# Patient Record
Sex: MALE | Race: BLACK OR AFRICAN AMERICAN | NOT HISPANIC OR LATINO | Employment: FULL TIME | URBAN - METROPOLITAN AREA
[De-identification: names, ages, dates, MRNs, and addresses within clinical notes are randomized per-mention and may not be internally consistent; named-entity substitution may affect disease eponyms.]

---

## 2017-01-25 ENCOUNTER — INITIAL CONSULT (OUTPATIENT)
Dept: OTOLARYNGOLOGY | Facility: CLINIC | Age: 37
End: 2017-01-25
Payer: COMMERCIAL

## 2017-01-25 VITALS
HEIGHT: 71 IN | TEMPERATURE: 98 F | BODY MASS INDEX: 35.74 KG/M2 | WEIGHT: 255.31 LBS | DIASTOLIC BLOOD PRESSURE: 79 MMHG | HEART RATE: 70 BPM | SYSTOLIC BLOOD PRESSURE: 127 MMHG

## 2017-01-25 DIAGNOSIS — J34.2 NASAL SEPTAL DEVIATION: ICD-10-CM

## 2017-01-25 DIAGNOSIS — H73.893 TYMPANIC MEMBRANE RETRACTION, BILATERAL: ICD-10-CM

## 2017-01-25 DIAGNOSIS — G47.33 OSA (OBSTRUCTIVE SLEEP APNEA): Primary | ICD-10-CM

## 2017-01-25 DIAGNOSIS — J34.3 HYPERTROPHY OF BOTH INFERIOR NASAL TURBINATES: ICD-10-CM

## 2017-01-25 DIAGNOSIS — J35.3 TONSILLAR AND ADENOID HYPERTROPHY: ICD-10-CM

## 2017-01-25 PROCEDURE — 99203 OFFICE O/P NEW LOW 30 MIN: CPT | Mod: 25,S$GLB,, | Performed by: OTOLARYNGOLOGY

## 2017-01-25 PROCEDURE — 99999 PR PBB SHADOW E&M-EST. PATIENT-LVL III: CPT | Mod: PBBFAC,,, | Performed by: OTOLARYNGOLOGY

## 2017-01-25 PROCEDURE — 31231 NASAL ENDOSCOPY DX: CPT | Mod: S$GLB,,, | Performed by: OTOLARYNGOLOGY

## 2017-01-25 PROCEDURE — 1159F MED LIST DOCD IN RCRD: CPT | Mod: S$GLB,,, | Performed by: OTOLARYNGOLOGY

## 2017-01-25 RX ORDER — LISINOPRIL 20 MG/1
TABLET ORAL
Refills: 2 | COMMUNITY
Start: 2017-01-05 | End: 2018-01-31

## 2017-01-25 RX ORDER — TESTOSTERONE CYPIONATE 200 MG/ML
INJECTION, SOLUTION INTRAMUSCULAR
Refills: 2 | COMMUNITY
Start: 2016-12-02 | End: 2021-03-11

## 2017-01-25 RX ORDER — ERGOCALCIFEROL 1.25 MG/1
CAPSULE ORAL
Refills: 3 | COMMUNITY
Start: 2016-12-08 | End: 2018-01-31

## 2017-01-25 NOTE — LETTER
January 26, 2017      Beto Bragg MD  [address]  [facility] [address]           [facility] - Otorhinolaryngology  [address]  [facility] [address]  Phone: [phone]  Fax: [phone]          Patient: Francisco May II   MR Number: [id number]   YOB: 1980   Date of Visit: 1/25/2017       Dear Dr. Beto Bragg:    Thank you for referring Francisco May to me for evaluation. Attached you will find relevant portions of my assessment and plan of care.    If you have questions, please do not hesitate to call me. I look forward to following Francisco May along with you.    Sincerely,    Hollis Rice III, MD    Enclosure  CC:  No Recipients    If you would like to receive this communication electronically, please contact [email or web address] or [phone] to request more information on [employer] Link access.    For providers and/or their staff who would like to refer a patient to [facility], please contact us through our one-stop-shop provider referral line, [facility], at [phone].    If you feel you have received this communication in error or would no longer like to receive these types of communications, please e-mail [email or web address]

## 2017-01-25 NOTE — PATIENT INSTRUCTIONS
Endoscopy performed  Consult to sleep study ordered( [place] [id number])   Flonase use 1-2 sprays @ lateral nostril once a day prn  Gentle middle ear insufflation techniques encouraged  Indications for airway surgery briefly discussed; card for Dr. ISAMAR Maldonado provided for reference

## 2017-01-25 NOTE — PROGRESS NOTES
Subjective:       Patient ID: Francisco May II is a 36 y.o. male.    Chief Complaint: No chief complaint on file.    HPI: Mr. May is a 36 year old AAM who is a  working for [employer].  He indicates his failure to gain compliance with CPAP using treatment for his VITOR problem.  He has tried utolizing a full face mask with different pressures.  He was studied at an outside facility.  More recently was prescribed Flonase for nasal and eustachian tube condition affecting fluid behind his right ear by his GP.. The medication help clear up his clogged right ear due to fluid.  He indicates a previous myringotomy procedure as well as PE intubation procedures performed by a physician on the  [facility] 2013/2014.  He indicates his sinus problems.  He breathes out one nostril only;  the patency can switch sides.  He ndicates recent weight gain.  He has a history of syphilis previously treated with antibiotics.    PMH: High blood pressure  PSH: 1 ankle procedure and 2 knee surgeries  Family history: High blood pressure, stroke, diabetes  Habits: 1 alcoholic drink per day and one half caffeinated cup per day  Occupation: Portfolio coordinator  Review of Systems     Other:  Negative for rash.     Medication list includes Glucophage and lisinopril      Objective:     The patient has consented to an endoscopic study today which is been explained to him in detail.  The patient is transferred into the endoscopy suite.  Scope number [id number] is used for the study.  Pictures are recorded through the device.  The patient witnesses the scoping procedure in real time on the monitor.  Procedure: 4% lidocaine and Juan Manuel-Synephrine sprayed in each nasal passage.  Cetacaine is applied to the posterior pharynx.  After waiting several minutes scoping is performed.  There is no evidence of polypoid disease or purulent discharge in either nasal passage.  Right nasal passage is  marginally less patent due to septal deflection.    Both nasal passages are quite patent, especially anteriorly. T the inferior turbinate mucosa is mildly inflamed initially..  There is a narrow oropharyngeal hypopharyngeal inlet noted.  The epiglottis is small.  The base the tongue is bulky but the tissue appears within normal limits.  Patient has an excellent glottic airway.  The piriform sinuses are clear.  There is no evidence of vocal cord paresis or paralysis.  There is no evidence of true vocal cord pathology.  Adenoid lymphoid tissue is observed in the nasopharynx.  Scope was withdrawn.    Blood pressure 127/79 pulse 70 temperature 97.9 height 5 feet 11 inches weight 255 pounds  Gen.: Alert and oriented gentleman in no acute distress.  He is not hoarse and does not appear ill.  Physical Exam   Constitutional: He is oriented to person, place, and time. He appears well-developed and well-nourished.   HENT:   Head: Normocephalic.   Right Ear: Hearing, tympanic membrane and ear canal normal. No drainage. No foreign bodies. No mastoid tenderness. Tympanic membrane is not perforated. No decreased hearing is noted.   Left Ear: Hearing, tympanic membrane and ear canal normal. No drainage. No foreign bodies. No mastoid tenderness. Tympanic membrane is not perforated. No decreased hearing is noted.   Ears:    Nose: No nose lacerations, nasal deformity, septal deviation or nasal septal hematoma. No epistaxis. Right sinus exhibits no maxillary sinus tenderness and no frontal sinus tenderness. Left sinus exhibits no maxillary sinus tenderness and no frontal sinus tenderness.   Mouth/Throat: Uvula is midline, oropharynx is clear and moist and mucous membranes are normal. He does not have dentures. No oral lesions. No trismus in the jaw. No uvula swelling or dental caries. No oropharyngeal exudate or tonsillar abscesses.       Neck: No thyromegaly present.   Pulmonary/Chest: Effort normal. No stridor.   Lymphadenopathy:      He has no cervical adenopathy.   Neurological: He is alert and oriented to person, place, and time.   Skin: No rash noted.   Psychiatric: His behavior is normal.       Assessment:       1. VITOR (obstructive sleep apnea)    2. Tonsillar and adenoid hypertrophy    3. Nasal septal deviation    4. Hypertrophy of both inferior nasal turbinates    5. Tympanic membrane retraction, bilateral        Plan:     Endoscopy performed  Consult to sleep study ordered( [place] [id number])   Flonase use 1-2 sprays @ lateral nostril once a day prn  Gentle middle ear insufflation techniques encouraged  Indications for airway surgery briefly discussed; card for Dr. ISAMAR Maldonado provided for reference

## 2017-01-25 NOTE — MR AVS SNAPSHOT
[facility] Otorhinol[facility]ology  [address]  [facility] [address]  Phone: [phone]  Fax: [phone]                  Francisco May II   2017 11:00 AM   Initial consult    Description:  Male : 1980   Provider:  Hollis Rice III, MD   Department:  [facility] - Otorhinolaryngology           Diagnoses this Visit        Comments    VITOR (obstructive sleep apnea)    -  Primary     Tonsillar and adenoid hypertrophy         Nasal septal deviation         Hypertrophy of both inferior nasal turbinates         Tympanic membrane retraction, bilateral                To Do List           Future Appointments        Provider Department Dept Phone    2017 2:00 PM Beto Bragg MD [facility] - Infectious Diseases [phone]      Goals (5 Years of Data)     None      [facility]     [facility] On [facility] Nurse Care Line -  Assistance  Registered nurses in the [facility] On Call Center provide clinical advisement, health education, appointment booking, and other advisory services.  Call for this free service at [phone].             Medications           Message regarding Medications     Verify the changes and/or additions to your medication regime listed below are the same as discussed with your clinician today.  If any of these changes or additions are incorrect, please notify your healthcare provider.             Verify that the below list of medications is an accurate representation of the medications you are currently taking.  If none reported, the list may be blank. If incorrect, please contact your healthcare provider. Carry this list with you in case of emergency.           Current Medications     buPROPion (WELLBUTRIN XL) 300 MG 24 hr tablet TK 1 T PO QD    emtricitabine-tenofovir 200-300 mg (TRUVADA) 200-300 mg Tab Take 1 tablet by mouth once daily.    ergocalciferol (ERGOCALCIFEROL) 50,000 unit Cap TK ONE C PO  ONCE D WEEKLY    lisinopril (PRINIVIL,ZESTRIL) 20 MG  "tablet TK 1 T PO QD    metformin (GLUCOPHAGE-XR) 500 MG 24 hr tablet TK 1 T PO QD    testosterone cypionate (DEPOTESTOTERONE CYPIONATE) 200 mg/mL injection INJECT 1 ML IM Q WEEK    zolpidem (AMBIEN) 10 mg Tab            Clinical Reference Information           Vital Signs - Last Recorded  Most recent update: 1/25/2017 11:06 AM by Julien Nguyễn MA    BP Pulse Temp    127/79 (BP Location: Right arm, Patient Position: Sitting, BP Method: Automatic) 70 97.9 °F (36.6 °C) (Tympanic)    Ht Wt BMI    5' 11" (1.803 m) 115.8 kg (255 lb 4.7 oz) 35.61 kg/m2      Blood Pressure          Most Recent Value    BP  127/79      Allergies as of 1/25/2017     No Known Allergies      Immunizations Administered on Date of Encounter - 1/25/2017     None      Orders Placed During Today's Visit      Normal Orders This Visit    Ambulatory consult to Sleep Disorders       Instructions    Endoscopy performed  Consult to sleep study ordered( [place] [id number])   Flonase use 1-2 sprays @ lateral nostril once a day prn  Gentle middle ear insufflation techniques encouraged  Indications for airway surgery briefly discussed; card for Dr. ISAMAR Maldonado provided for reference             "

## 2017-01-26 ENCOUNTER — LAB VISIT (OUTPATIENT)
Dept: LAB | Facility: HOSPITAL | Age: 37
End: 2017-01-26
Attending: INTERNAL MEDICINE
Payer: COMMERCIAL

## 2017-01-26 ENCOUNTER — OFFICE VISIT (OUTPATIENT)
Dept: INFECTIOUS DISEASES | Facility: CLINIC | Age: 37
End: 2017-01-26
Payer: COMMERCIAL

## 2017-01-26 VITALS
HEART RATE: 82 BPM | BODY MASS INDEX: 35.49 KG/M2 | DIASTOLIC BLOOD PRESSURE: 77 MMHG | WEIGHT: 253.5 LBS | HEIGHT: 71 IN | TEMPERATURE: 98 F | SYSTOLIC BLOOD PRESSURE: 132 MMHG

## 2017-01-26 DIAGNOSIS — Z20.6 HIV EXPOSURE: ICD-10-CM

## 2017-01-26 PROCEDURE — 87591 N.GONORRHOEAE DNA AMP PROB: CPT

## 2017-01-26 PROCEDURE — 99213 OFFICE O/P EST LOW 20 MIN: CPT | Mod: S$GLB,,, | Performed by: INTERNAL MEDICINE

## 2017-01-26 PROCEDURE — 1159F MED LIST DOCD IN RCRD: CPT | Mod: S$GLB,,, | Performed by: INTERNAL MEDICINE

## 2017-01-26 PROCEDURE — 99999 PR PBB SHADOW E&M-EST. PATIENT-LVL III: CPT | Mod: PBBFAC,,, | Performed by: INTERNAL MEDICINE

## 2017-01-26 RX ORDER — EMTRICITABINE AND TENOFOVIR DISOPROXIL FUMARATE 200; 300 MG/1; MG/1
1 TABLET, FILM COATED ORAL DAILY
Qty: 30 TABLET | Refills: 3 | Status: SHIPPED | OUTPATIENT
Start: 2017-01-26 | End: 2017-06-22 | Stop reason: SDUPTHER

## 2017-01-26 NOTE — MR AVS SNAPSHOT
[facility] - Infectious Diseases  [address]  [place] [address]  Phone: [phone]  Fax: [phone]                  Francisco May II   2017 2:00 PM   Office Visit    Description:  Male : 1980   Provider:  Beto Bragg MD   Department:  [facility] - Infectious Diseases           Diagnoses this Visit        Comments    HIV exposure                To Do List           Future Appointments        Provider Department Dept Phone    2017 2:40 PM LAB, APPOINTMENT [place] [facility] [phone]      Goals (5 Years of Data)     None       These Medications        Disp Refills Start End    emtricitabine-tenofovir 200-300 mg (TRUVADA) 200-300 mg Tab 30 tablet 3 2017    Take 1 tablet by mouth once daily. - Oral    Pharmacy: [id number] - [place], [place] - [address] AT [facility] Ph #: [phone]         [facility] On Call     [facility] On Call Nurse Care Line -  Assistance  Registered nurses in the [facility] On Call Center provide clinical advisement, health education, appointment booking, and other advisory services.  Call for this free service at [phone].             Medications           Message regarding Medications     Verify the changes and/or additions to your medication regime listed below are the same as discussed with your clinician today.  If any of these changes or additions are incorrect, please notify your healthcare provider.             Verify that the below list of medications is an accurate representation of the medications you are currently taking.  If none reported, the list may be blank. If incorrect, please contact your healthcare provider. Carry this list with you in case of emergency.           Current Medications     buPROPion (WELLBUTRIN XL) 300 MG 24 hr tablet TK 1 T PO QD    ergocalciferol (ERGOCALCIFEROL) 50,000 unit Cap TK ONE C PO  ONCE D WEEKLY     "lisinopril (PRINIVIL,ZESTRIL) 20 MG tablet TK 1 T PO QD    metformin (GLUCOPHAGE-XR) 500 MG 24 hr tablet TK 1 T PO QD    testosterone cypionate (DEPOTESTOTERONE CYPIONATE) 200 mg/mL injection INJECT 1 ML IM Q WEEK    zolpidem (AMBIEN) 10 mg Tab     emtricitabine-tenofovir 200-300 mg (TRUVADA) 200-300 mg Tab Take 1 tablet by mouth once daily.           Clinical Reference Information           Vital Signs - Last Recorded  Most recent update: 1/26/2017  2:13 PM by Maria Fernanda Perry MA    BP Pulse Temp Ht Wt BMI    132/77 (BP Location: Left arm, Patient Position: Sitting, BP Method: Automatic) 82 97.9 °F (36.6 °C) (Oral) 5' 11" (1.803 m) 115 kg (253 lb 8.5 oz) 35.36 kg/m2      Blood Pressure          Most Recent Value    BP  132/77      Allergies as of 1/26/2017     No Known Allergies      Immunizations Administered on Date of Encounter - 1/26/2017     None      Orders Placed During Today's Visit     Future Labs/Procedures Expected by Expires    C. trachomatis/N. gonorrhoeae by AMP DNA Urine  1/26/2017 3/27/2018    Comprehensive metabolic panel  1/26/2017 3/27/2018    Herpes simplex type 1&2 IgG,Herpes titer  1/26/2017 1/26/2018    HIV-1 and HIV-2 antibodies  1/26/2017 1/26/2018    RPR  1/26/2017 3/27/2018      "

## 2017-01-26 NOTE — PROGRESS NOTES
Subjective:      Patient ID: Francisco May II is a 36 y.o. male.    Chief Complaint:No chief complaint on file.      History of Present Illness    Mr. May is a 37 y/o ortiz male who is involved in an open relationship with an HIV positive male.  He has been taking truvada for PREP.  He presents today for routine follow up.  He doesn't report any complaints.    Review of Systems   Constitution: Negative for chills, decreased appetite, fever, weakness, malaise/fatigue, night sweats, weight gain and weight loss.   HENT: Negative for congestion, ear pain, headaches, hearing loss, hoarse voice, sore throat and tinnitus.    Eyes: Negative for blurred vision, redness and visual disturbance.   Cardiovascular: Negative for chest pain, leg swelling and palpitations.   Respiratory: Negative for cough, hemoptysis, shortness of breath and sputum production.    Hematologic/Lymphatic: Negative for adenopathy. Does not bruise/bleed easily.   Skin: Negative for dry skin, itching, rash and suspicious lesions.   Musculoskeletal: Negative for back pain, joint pain, myalgias and neck pain.   Gastrointestinal: Negative for abdominal pain, constipation, diarrhea, heartburn, nausea and vomiting.   Genitourinary: Negative for dysuria, flank pain, frequency, hematuria, hesitancy and urgency.   Neurological: Negative for dizziness, numbness and paresthesias.   Psychiatric/Behavioral: Negative for depression and memory loss. The patient does not have insomnia and is not nervous/anxious.      Objective:   Physical Exam   Constitutional: He is oriented to person, place, and time. He appears well-developed and well-nourished. No distress.   HENT:   Head: Normocephalic and atraumatic.   Right Ear: External ear normal.   Left Ear: External ear normal.   Nose: Nose normal.   Mouth/Throat: Oropharynx is clear and moist. No oropharyngeal exudate.   Eyes: Conjunctivae and EOM are normal. Pupils are equal, round, and reactive to light. Right  eye exhibits no discharge. Left eye exhibits no discharge. No scleral icterus.   Neck: Normal range of motion. Neck supple. No JVD present. No tracheal deviation present. No thyromegaly present.   Cardiovascular: Normal rate, regular rhythm, normal heart sounds and intact distal pulses.  Exam reveals no gallop and no friction rub.    No murmur heard.  Pulmonary/Chest: Effort normal and breath sounds normal. No stridor. No respiratory distress. He has no wheezes. He has no rales. He exhibits no tenderness.   Abdominal: Soft. Bowel sounds are normal. He exhibits no distension and no mass. There is no tenderness. There is no rebound and no guarding.   Musculoskeletal: Normal range of motion. He exhibits no edema or tenderness.   Lymphadenopathy:     He has no cervical adenopathy.   Neurological: He is alert and oriented to person, place, and time. He has normal reflexes. He displays normal reflexes. No cranial nerve deficit. He exhibits normal muscle tone. Coordination normal.   Skin: Skin is warm. No rash noted. He is not diaphoretic. No erythema. No pallor.   Psychiatric: He has a normal mood and affect. His behavior is normal. Judgment and thought content normal.   Nursing note and vitals reviewed.    Assessment:       1. HIV exposure        I will screen him for HIV today.  I will also screen him for other STDs.  I will continue truvada.  Plan:       HIV exposure  -     emtricitabine-tenofovir 200-300 mg (TRUVADA) 200-300 mg Tab; Take 1 tablet by mouth once daily.  Dispense: 30 tablet; Refill: 3  -     HIV-1 and HIV-2 antibodies; Future; Expected date: 1/26/17  -     RPR; Future; Expected date: 1/26/17  -     Comprehensive metabolic panel; Future; Expected date: 1/26/17  -     C. trachomatis/N. gonorrhoeae by AMP DNA Urine; Future; Expected date: 1/26/17  -     Herpes simplex type 1&2 IgG,Herpes titer; Future; Expected date: 1/26/17

## 2017-01-27 LAB
C TRACH DNA SPEC QL NAA+PROBE: POSITIVE
N GONORRHOEA DNA SPEC QL NAA+PROBE: NEGATIVE

## 2017-01-29 ENCOUNTER — TELEPHONE (OUTPATIENT)
Dept: INFECTIOUS DISEASES | Facility: CLINIC | Age: 37
End: 2017-01-29

## 2017-01-29 ENCOUNTER — PATIENT MESSAGE (OUTPATIENT)
Dept: INFECTIOUS DISEASES | Facility: CLINIC | Age: 37
End: 2017-01-29

## 2017-01-29 DIAGNOSIS — A74.9 CHLAMYDIA: Primary | ICD-10-CM

## 2017-01-29 RX ORDER — AZITHROMYCIN 500 MG/1
1000 TABLET, FILM COATED ORAL ONCE
Qty: 2 TABLET | Refills: 0 | Status: SHIPPED | OUTPATIENT
Start: 2017-01-29 | End: 2017-01-29

## 2017-01-30 ENCOUNTER — TELEPHONE (OUTPATIENT)
Dept: SLEEP MEDICINE | Facility: CLINIC | Age: 37
End: 2017-01-30

## 2017-06-06 ENCOUNTER — PATIENT MESSAGE (OUTPATIENT)
Dept: INFECTIOUS DISEASES | Facility: CLINIC | Age: 37
End: 2017-06-06

## 2017-06-07 ENCOUNTER — OFFICE VISIT (OUTPATIENT)
Dept: INFECTIOUS DISEASES | Facility: CLINIC | Age: 37
End: 2017-06-07
Payer: COMMERCIAL

## 2017-06-07 VITALS
SYSTOLIC BLOOD PRESSURE: 122 MMHG | HEART RATE: 87 BPM | WEIGHT: 262 LBS | HEIGHT: 71 IN | TEMPERATURE: 98 F | DIASTOLIC BLOOD PRESSURE: 73 MMHG | BODY MASS INDEX: 36.68 KG/M2

## 2017-06-07 DIAGNOSIS — Z20.6 HIV EXPOSURE FROM BODY FLUIDS: Primary | ICD-10-CM

## 2017-06-07 DIAGNOSIS — Z11.3 SCREEN FOR STD (SEXUALLY TRANSMITTED DISEASE): ICD-10-CM

## 2017-06-07 DIAGNOSIS — N34.2 URETHRITIS: ICD-10-CM

## 2017-06-07 PROCEDURE — 99999 PR PBB SHADOW E&M-EST. PATIENT-LVL III: CPT | Mod: PBBFAC,,, | Performed by: INTERNAL MEDICINE

## 2017-06-07 PROCEDURE — 96372 THER/PROPH/DIAG INJ SC/IM: CPT | Mod: S$GLB,,, | Performed by: INTERNAL MEDICINE

## 2017-06-07 PROCEDURE — 87591 N.GONORRHOEAE DNA AMP PROB: CPT | Mod: 91

## 2017-06-07 PROCEDURE — 87591 N.GONORRHOEAE DNA AMP PROB: CPT

## 2017-06-07 PROCEDURE — 99213 OFFICE O/P EST LOW 20 MIN: CPT | Mod: 25,S$GLB,, | Performed by: INTERNAL MEDICINE

## 2017-06-07 RX ORDER — AZITHROMYCIN 500 MG/1
1000 TABLET, FILM COATED ORAL ONCE
Qty: 2 TABLET | Refills: 0 | Status: SHIPPED | OUTPATIENT
Start: 2017-06-07 | End: 2017-06-07

## 2017-06-07 NOTE — PROGRESS NOTES
Subjective:      Patient ID: Francisco May II is a 36 y.o. male.    Chief Complaint:Follow-up      History of Present Illness    Mr. May is a 37 y/o HIV negative male.  He is in an open relationship with an HIV positive male.  He has been on truvada for PREP.  Today, he reports symptoms of penile discharge.  He would like to be tested and possibly treated.    Review of Systems   Constitution: Negative for chills, decreased appetite, fever, weakness, malaise/fatigue, night sweats, weight gain and weight loss.   HENT: Negative for congestion, ear pain, headaches, hearing loss, hoarse voice, sore throat and tinnitus.    Eyes: Negative for blurred vision, redness and visual disturbance.   Cardiovascular: Negative for chest pain, leg swelling and palpitations.   Respiratory: Negative for cough, hemoptysis, shortness of breath and sputum production.    Hematologic/Lymphatic: Negative for adenopathy. Does not bruise/bleed easily.   Skin: Negative for dry skin, itching, rash and suspicious lesions.   Musculoskeletal: Negative for back pain, joint pain, myalgias and neck pain.   Gastrointestinal: Negative for abdominal pain, constipation, diarrhea, heartburn, nausea and vomiting.   Genitourinary: Negative for dysuria, flank pain, frequency, hematuria, hesitancy and urgency.   Neurological: Negative for dizziness, numbness and paresthesias.   Psychiatric/Behavioral: Negative for depression and memory loss. The patient does not have insomnia and is not nervous/anxious.      Objective:   Physical Exam   Constitutional: He is oriented to person, place, and time. He appears well-developed and well-nourished. No distress.   HENT:   Head: Normocephalic and atraumatic.   Right Ear: External ear normal.   Left Ear: External ear normal.   Nose: Nose normal.   Mouth/Throat: Oropharynx is clear and moist. No oropharyngeal exudate.   Eyes: Conjunctivae and EOM are normal. Pupils are equal, round, and reactive to light.  Right eye exhibits no discharge. Left eye exhibits no discharge. No scleral icterus.   Neck: Normal range of motion. Neck supple. No JVD present. No tracheal deviation present. No thyromegaly present.   Cardiovascular: Normal rate, regular rhythm, normal heart sounds and intact distal pulses.  Exam reveals no gallop and no friction rub.    No murmur heard.  Pulmonary/Chest: Effort normal and breath sounds normal. No stridor. No respiratory distress. He has no wheezes. He has no rales. He exhibits no tenderness.   Abdominal: Soft. Bowel sounds are normal. He exhibits no distension and no mass. There is no tenderness. There is no rebound and no guarding.   Musculoskeletal: Normal range of motion. He exhibits no edema or tenderness.   Lymphadenopathy:     He has no cervical adenopathy.   Neurological: He is alert and oriented to person, place, and time. He has normal reflexes. He displays normal reflexes. No cranial nerve deficit. He exhibits normal muscle tone. Coordination normal.   Skin: Skin is warm. No rash noted. He is not diaphoretic. No erythema. No pallor.   Psychiatric: He has a normal mood and affect. His behavior is normal. Judgment and thought content normal.   Nursing note and vitals reviewed.    Assessment:       1. HIV exposure from body fluids :  Will check HIV test.  He is to continue truvada for PREP for now.   2. Urethritis :  Patient reports penile discharge.  Will send urine for GC and chlamydia.  Will treat empirically with ceftriaxone IM and azithromycin PO.   3. Screen for STD (sexually transmitted disease) :  Will screen for STDs.  Also performed a swab of his throat and anal area for GC and chlamydia.         Plan:       HIV exposure from body fluids  -     HIV-1 and HIV-2 antibodies; Future; Expected date: 06/07/2017  -     CBC auto differential; Future; Expected date: 06/07/2017  -     Comprehensive metabolic panel; Future; Expected date: 06/07/2017  -     C. trachomatis/N. gonorrhoeae by  AMP DNA Urine; Future; Expected date: 06/07/2017  -     RPR; Future; Expected date: 06/07/2017  -     Cancel: FTA antibodies, IgG and IgM; Future; Expected date: 06/07/2018    Urethritis  -     C. trachomatis/N. gonorrhoeae by AMP DNA Urine; Future; Expected date: 06/07/2017  -     cefTRIAXone (ROCEPHIN) 250 mg in lidocaine HCL 10 mg/ml (1%) IM only syringe; Inject 1 mL (250 mg total) into the muscle once.  -     azithromycin (ZITHROMAX) 500 MG tablet; Take 2 tablets (1,000 mg total) by mouth once.  Dispense: 2 tablet; Refill: 0    Screen for STD (sexually transmitted disease)  -     HIV-1 and HIV-2 antibodies; Future; Expected date: 06/07/2017  -     CBC auto differential; Future; Expected date: 06/07/2017  -     Comprehensive metabolic panel; Future; Expected date: 06/07/2017  -     C. trachomatis/N. gonorrhoeae by AMP DNA Urine; Future; Expected date: 06/07/2017  -     RPR; Future; Expected date: 06/07/2017  -     Cancel: FTA antibodies, IgG and IgM; Future; Expected date: 06/07/2018  -     Miscellaneous Sendout Test Other (Specify) (Pharyngeal swab); Future; Expected date: 06/07/2017  -     Miscellaneous Sendout Test Other (Specify) (Pharyngeal swab); Future; Expected date: 06/07/2017  -     Miscellaneous Sendout Test Other (Specify) (anal swab); Future; Expected date: 06/07/2017  -     Miscellaneous Sendout Test Other (Specify) (anal swab); Future; Expected date: 06/07/2017  -     FTA antibodies, IgG and IgM; Future; Expected date: 06/07/2018

## 2017-06-08 ENCOUNTER — PATIENT MESSAGE (OUTPATIENT)
Dept: INFECTIOUS DISEASES | Facility: CLINIC | Age: 37
End: 2017-06-08

## 2017-06-08 LAB
C TRACH DNA SPEC QL NAA+PROBE: NOT DETECTED
N GONORRHOEA DNA SPEC QL NAA+PROBE: DETECTED

## 2017-06-09 LAB
C.TRACH RNA SOURCE: NORMAL
C.TRACH RNA SOURCE: NORMAL
C.TRACH,MISC. AMP RNA: NEGATIVE
C.TRACH,MISC. AMP RNA: NEGATIVE
N.GONORROHEAE, AMP RNA SOURCE: NORMAL
N.GONORROHEAE, AMP RNA SOURCE: NORMAL
N.GONORROHEAE, MISC. AMP RNA: NEGATIVE
N.GONORROHEAE, MISC. AMP RNA: NEGATIVE

## 2017-06-22 DIAGNOSIS — Z20.6 HIV EXPOSURE: ICD-10-CM

## 2017-06-23 RX ORDER — EMTRICITABINE AND TENOFOVIR DISOPROXIL FUMARATE 200; 300 MG/1; MG/1
TABLET, FILM COATED ORAL
Qty: 30 TABLET | Refills: 0 | Status: SHIPPED | OUTPATIENT
Start: 2017-06-23 | End: 2017-07-17 | Stop reason: SDUPTHER

## 2017-07-12 ENCOUNTER — PATIENT MESSAGE (OUTPATIENT)
Dept: INFECTIOUS DISEASES | Facility: CLINIC | Age: 37
End: 2017-07-12

## 2017-07-17 ENCOUNTER — TELEPHONE (OUTPATIENT)
Dept: PHARMACY | Facility: CLINIC | Age: 37
End: 2017-07-17

## 2017-07-17 DIAGNOSIS — Z20.6 HIV EXPOSURE: ICD-10-CM

## 2017-07-17 DIAGNOSIS — Z71.7 ENCOUNTER FOR HIV COUNSELING: Primary | ICD-10-CM

## 2017-07-17 RX ORDER — EMTRICITABINE AND TENOFOVIR DISOPROXIL FUMARATE 200; 300 MG/1; MG/1
1 TABLET, FILM COATED ORAL DAILY
Qty: 30 TABLET | Refills: 2 | Status: SHIPPED | OUTPATIENT
Start: 2017-07-17 | End: 2017-07-18

## 2017-07-17 NOTE — TELEPHONE ENCOUNTER
LVM- Hello this is [facility] specialty pharmacy, we have received an order from your provider for Truvada and will contact you once a benefits investigation is complete with copay information to set up pickup or shipment and [facility] consultation.  feel free to give us a call with  any questions prior to hearing back from us at [phone]. thank you!_ARLEN 7/17/17

## 2017-07-18 ENCOUNTER — PATIENT MESSAGE (OUTPATIENT)
Dept: INFECTIOUS DISEASES | Facility: CLINIC | Age: 37
End: 2017-07-18

## 2017-07-18 RX ORDER — EMTRICITABINE AND TENOFOVIR DISOPROXIL FUMARATE 200; 300 MG/1; MG/1
1 TABLET, FILM COATED ORAL DAILY
Qty: 30 TABLET | Refills: 3 | Status: SHIPPED | OUTPATIENT
Start: 2017-07-18 | End: 2017-12-11 | Stop reason: SDUPTHER

## 2017-07-18 NOTE — TELEPHONE ENCOUNTER
[facility]: [place] Person Code: [id number]  Truvada prior authorization approved on 07/18/2017  Approval date: 07/18/2017-10/18/2017  PA# [id number]    Preferred Specialty Pharmacy  [facility]  Telephone: [phone]

## 2017-07-18 NOTE — TELEPHONE ENCOUNTER
Unable to reach out to patient regarding specialty medication for Truvada. Prior authorization approved on 07/18/2017 but insurance mandates to fill with [facility]. Patient will need to call them to set up an account and provider will need to e-script/fax a prescription. Will also send via [facility].     Patient returned phone call back regarding specialty medication for Truvada: Inform patient the medication prior authorization is approved also reminder patient he will need to complete lab every 3 months. Patient will reach out to [facility] to check on the prescription. He have enough medication until the end of the month. Patient voiced understanding.

## 2017-12-11 DIAGNOSIS — Z71.7 ENCOUNTER FOR HIV COUNSELING: ICD-10-CM

## 2017-12-11 DIAGNOSIS — Z20.6 HIV EXPOSURE: ICD-10-CM

## 2017-12-11 RX ORDER — EMTRICITABINE AND TENOFOVIR DISOPROXIL FUMARATE 200; 300 MG/1; MG/1
1 TABLET, FILM COATED ORAL DAILY
Qty: 30 TABLET | Refills: 3 | Status: SHIPPED | OUTPATIENT
Start: 2017-12-11 | End: 2017-12-13 | Stop reason: SDUPTHER

## 2017-12-11 NOTE — TELEPHONE ENCOUNTER
Refill request signed.  However I would like for him to get these labs done before re-starting the truvada just to make sure we don't cause any problems.  I am putting the orders in now.

## 2017-12-13 DIAGNOSIS — Z20.6 HIV EXPOSURE: ICD-10-CM

## 2017-12-13 DIAGNOSIS — Z71.7 ENCOUNTER FOR HIV COUNSELING: ICD-10-CM

## 2017-12-13 RX ORDER — EMTRICITABINE AND TENOFOVIR DISOPROXIL FUMARATE 200; 300 MG/1; MG/1
1 TABLET, FILM COATED ORAL DAILY
Qty: 30 TABLET | Refills: 3 | Status: SHIPPED | OUTPATIENT
Start: 2017-12-13 | End: 2018-05-07 | Stop reason: SDUPTHER

## 2017-12-13 NOTE — TELEPHONE ENCOUNTER
Pts medication was sent to [facility] instead of [facility]. Please resend. Pharmacy updated in pts chart. JASIEL

## 2018-01-24 ENCOUNTER — TELEPHONE (OUTPATIENT)
Dept: SLEEP MEDICINE | Facility: CLINIC | Age: 38
End: 2018-01-24

## 2018-01-31 ENCOUNTER — OFFICE VISIT (OUTPATIENT)
Dept: SLEEP MEDICINE | Facility: CLINIC | Age: 38
End: 2018-01-31
Payer: COMMERCIAL

## 2018-01-31 VITALS
SYSTOLIC BLOOD PRESSURE: 140 MMHG | HEIGHT: 71 IN | DIASTOLIC BLOOD PRESSURE: 76 MMHG | BODY MASS INDEX: 38.7 KG/M2 | WEIGHT: 276.44 LBS | HEART RATE: 76 BPM

## 2018-01-31 DIAGNOSIS — G47.33 OBSTRUCTIVE SLEEP APNEA: Primary | ICD-10-CM

## 2018-01-31 PROCEDURE — 3008F BODY MASS INDEX DOCD: CPT | Mod: S$GLB,,, | Performed by: NURSE PRACTITIONER

## 2018-01-31 PROCEDURE — 99999 PR PBB SHADOW E&M-EST. PATIENT-LVL III: CPT | Mod: PBBFAC,,, | Performed by: NURSE PRACTITIONER

## 2018-01-31 PROCEDURE — 99204 OFFICE O/P NEW MOD 45 MIN: CPT | Mod: SA,S$GLB,, | Performed by: NURSE PRACTITIONER

## 2018-01-31 NOTE — PATIENT INSTRUCTIONS
Obstructive Sleep Apnea  Obstructive sleep apnea is a condition that causes your air passages to become narrowed or blocked during sleep. As a result, breathing stops for short periods. Your body wakes up enough for breathing to begin again, though you don't remember it. The cycle of stopped breathing and brief awakenings can repeat dozens of times a night. This prevents the body from getting to the deeper stages of sleep that are needed for good rest and may cause your body's oxygen level to fall.  Signs of sleep apnea include loud snoring, noisy breathing, and gasping sounds during sleep. Daytime symptoms include waking up tired after a full night's sleep, waking up with headaches, feeling very sleepy or falling asleep during the day, and having problems with memory or concentration.  Risk factors for sleep apnea include:  · Being overweight  · Being a man, or a woman in menopause  · Smoking  · Using alcohol or sedating medicines  · Having enlarged structures in the nose or throat  Home care  Lifestyle changes that can help treat snoring and sleep apnea include the following:  · If you are overweight, lose weight. Talk to your healthcare provider about a weight-loss plan for you.  · Avoid alcohol for 3 to 4 hours before bedtime. Avoid sedating medications. Ask your healthcare provider about the medicines you take.  · If you smoke, talk to your healthcare provider about ways to quit.  · Sleep on your side. This can help prevent gravity from pulling relaxed throat tissues into your breathing passages.  · If you have allergies or sinus problems that block your nose, ask your healthcare provider for help.  Follow-up care  Follow up with your healthcare provider, or as advised. A diagnosis of sleep apnea is made with a sleep study. Your healthcare provider can tell you more about this test.  When to seek medical advice  Sleep apnea can make you more likely to have certain health problems. These include high blood  pressure, heart attack, stroke, and sexual dysfunction. If you have sleep apnea, talk to your healthcare provider about the best treatments for you.  Date Last Reviewed: 4/1/2017  © 9538-8344 [email or web address]. [address] [place] [address], [place] [address]. All rights reserved. This information is not intended as a substitute for professional medical care. Always follow your healthcare professional's instructions.

## 2018-01-31 NOTE — PROGRESS NOTES
"Francisco May II was seen as a new patient, referred by. Dr. Rice for the management of obstructive sleep apnea.     CHIEF COMPLAINT: Snoring, excessive daytime sleepiness    HISTORY OF PRESENT ILLNESS Francisco May II a 37 y.o.  male presents for the management of obstructive sleep apnea. Per records "He indicates his failure to gain compliance with CPAP using treatment for his VITOR problem.  He has tried utolizing a full face mask with different pressures.  He was studied at an outside facility" Study was 2010. Was always hard to fall asleep with big FFM on. Could never use it. Would awake suffocating sensation. Stopped using it ~ 5 yrs ago. W/o mask +snores and + daytime sleepiness/tired on a regular basis. He struggles in the am, gets to work later than he would like. Denies am headaches. When aroused from sleep has to take deep breath and his heart is racing. +witnessed apneic pauses. Nocturia 2x. Gained 45# past 1. 5yrs.   Energy level is low. Wishes he could be up at 6a and ride bike/exercise.     Denies symptoms of restless legs or kicking during sleep.     On todays Cunningham Sleepiness Scale the patient scores a 18/24.     BT:11:30p-1am  WT: 7a alarm goes off, sleeps through,wakes up 8a  Sleep quality: 2/5 quality,  unrefreshing     FAMILY HISTORY: uncle +VITOR    SOCIAL HISTORY: Single. 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  Otherwise, a balance of 10 systems reviewed is negative        PHYSICAL EXAM:   BP (!) 140/76   Pulse 76   Ht 5' 11" (1.803 m)   Wt 125.4 kg (276 lb 7.3 oz)   BMI 38.56 kg/m²   GENERAL: Obese body habitus, well groomed   HEENT: Conjunctivae are non-erythematous; Pupils equal, round, and reactive to light; Nose is " symmetrical; Nasal mucosa is normal; Septum is deviated; Inferior turbinates are normal; Nasal airflow is normal; Posterior pharynx is pink; Modified Mallampati: IV; Posterior palate is low; Tonsils 2-3+; Uvula is long/thick and erythematous;Tongue is high, broad with scalloped edges; Dentition is fair; No TMJ tenderness; Jaw opening and protrusion without click and without discomfort.   NECK: Supple. Neck circumference is 18.5 inches. No thyromegaly. No palpable nodes.   SKIN: On face and neck: No abrasions, no rashes, no lesions. No subcutaneous nodules are palpable.   RESPIRATORY: Chest is clear to auscultation. Normal chest expansion and non-labored breathing at rest.   CARDIOVASCULAR: Normal S1, S2. No murmurs, gallops or rubs. No carotid bruits bilaterally.   EXTREMITIES: No edema. No clubbing. No cyanosis. Station normal. Gait normal.   NEURO/PSYCH: Oriented to time, place and person. Normal attention span and concentration. Affect is full. Mood is normal.       ASSESSMENT:     VITOR, unknown severity, dx'd 2010 outside facility/no records to review today. Was intolerable to using cpap ?pressure after setup, smothering sensation/mask interface.He had no adherence monitoring. Ready to resume. Persistent symptoms of snoring/apneic pauses/daytime sleepiness/dozing off affecting work.     PLAN:   1. Obtain outside sleep study, he may have at home/scan to me. Bring cpap machine to [facility] to interrogation/ensure cflex at 2-3. Get new FFM/meet with resp therapist. RTC 4-5 wks AFTER resumign use for adherence monitoring/ensure successful re-acclimation   2. Discussed etiology of VITOR and potential ramifications of untreated VITOR, including stroke, heart disease, HTN.  We discussed potential treatment options, which could include weight loss (10-15%), continuous positive airway pressure (CPAP-definitive), mandibular advancement splint by dentist, or referral for surgical consideration.   3. The patient was advised to abstain  from driving should he feel sleepy or drowsy.   4. Encouraged weight loss efforts for potential improvement of VITOR and overall health benefits      Thank you for allowing me the opportunity to participate in the care of your patient

## 2018-03-28 ENCOUNTER — OFFICE VISIT (OUTPATIENT)
Dept: OTOLARYNGOLOGY | Facility: CLINIC | Age: 38
End: 2018-03-28
Payer: COMMERCIAL

## 2018-03-28 VITALS
WEIGHT: 275.56 LBS | DIASTOLIC BLOOD PRESSURE: 71 MMHG | HEART RATE: 78 BPM | SYSTOLIC BLOOD PRESSURE: 119 MMHG | BODY MASS INDEX: 38.43 KG/M2

## 2018-03-28 DIAGNOSIS — H91.91 HEARING LOSS OF RIGHT EAR, UNSPECIFIED HEARING LOSS TYPE: ICD-10-CM

## 2018-03-28 DIAGNOSIS — H65.01 RIGHT ACUTE SEROUS OTITIS MEDIA, RECURRENCE NOT SPECIFIED: Primary | ICD-10-CM

## 2018-03-28 DIAGNOSIS — J35.1 TONSILLAR HYPERTROPHY: ICD-10-CM

## 2018-03-28 DIAGNOSIS — Z96.22 S/P MYRINGOTOMY WITH INSERTION OF TUBE: ICD-10-CM

## 2018-03-28 DIAGNOSIS — G47.33 OSA ON CPAP: ICD-10-CM

## 2018-03-28 PROCEDURE — 99999 PR PBB SHADOW E&M-EST. PATIENT-LVL III: CPT | Mod: PBBFAC,,, | Performed by: OTOLARYNGOLOGY

## 2018-03-28 PROCEDURE — 99213 OFFICE O/P EST LOW 20 MIN: CPT | Mod: S$GLB,,, | Performed by: OTOLARYNGOLOGY

## 2018-03-28 RX ORDER — ERGOCALCIFEROL 1.25 MG/1
CAPSULE ORAL
Refills: 3 | COMMUNITY
Start: 2018-03-14 | End: 2021-03-09 | Stop reason: SDUPTHER

## 2018-03-28 RX ORDER — AMOXICILLIN AND CLAVULANATE POTASSIUM 875; 125 MG/1; MG/1
1 TABLET, FILM COATED ORAL 2 TIMES DAILY
Qty: 20 TABLET | Refills: 0 | Status: SHIPPED | OUTPATIENT
Start: 2018-03-28 | End: 2018-04-07

## 2018-03-28 RX ORDER — BENZONATATE 100 MG/1
CAPSULE ORAL
Refills: 0 | COMMUNITY
Start: 2018-02-26 | End: 2018-04-16

## 2018-03-28 RX ORDER — (CIPROFLOXACIN AND FLUOCINOLONE ACETONIDE) .75; .0625 MG/.25ML; MG/.25ML
SOLUTION AURICULAR (OTIC)
Refills: 0 | COMMUNITY
Start: 2018-02-21 | End: 2018-04-16

## 2018-03-28 NOTE — PATIENT INSTRUCTIONS
Gentle middle ear insufflation techniques encouraged  Mucinex D 60 mg in AM x 2-3 weeks may help  Politzerization attempted; E.T. literature provided  Rx for Augmentin 875/125; take BID with food x 10 days; electonically prescribed  RTC 2-3 weeks; audiometry on return  Continue use of Flonase daily  Discontinue use of ear drops

## 2018-03-28 NOTE — PROGRESS NOTES
Subjective:       Patient ID: Francisco May II is a 37 y.o. male.    Chief Complaint: Ear Drainage    HPI: Mr. May is a 37-year-old -American male with a BMI of 38 kg/m² with a history of VITOR who I initially evaluated here 1/25/17 for an airway evaluation.   He was diagnosed with tonsil and adenoid hypertrophy, nasal septal deviation, hypertrophy of his inferior turbinates and retracted tympanic membranes bilaterally.  He has been followed by our  infectious disease service here for exposure to HIV per EMR notes of 1/26/17.  He was taking Truvada for PREP.  He indicates recent perception of drainage from his right ear.  He feels like he is been on a plane.  In fact he has been on a plane to [facility] relatively recently.  He will fly again to [place] in late April of this year.  He ndicates treatment by a primary care physician with a type of ear drop,Otovel.  This has not helped his right ear condition at all.  He indicates decrease hearing perception in his right ear.  He indicates a history of a right myringotomy procedure performed for a right serous otitis media condition by a physician on [facility]  camp[facility] in early 2013..  PE tubes were ultimately placed in both years requiring anesthesia subsequently.  Both tubes have since extruded.  His right ear now feels the same as it did before the PE intubation procedures.  He has a previous history of syphilis treated with antibiotics.    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 Right acute serous otitis media, recurrence not specified    2. S/P myringotomy with insertion of tube    3. VITOR on CPAP    4. Hearing loss of right ear, unspecified hearing loss type    5. Tonsillar  hypertrophy        Plan:     Gentle middle ear insufflation techniques encouraged  Mucinex D 60 mg in AM x 2-3 weeks may help  Politzerization attempted; E.T. literature provided  Rx for Augmentin 875/125; take BID with food x 10 days; electonically prescribed  RTC 2-3 weeks; audiometry on return  Continue use of Flonase daily  Discontinue use of ear drops

## 2018-04-16 ENCOUNTER — OFFICE VISIT (OUTPATIENT)
Dept: OTOLARYNGOLOGY | Facility: CLINIC | Age: 38
End: 2018-04-16
Payer: COMMERCIAL

## 2018-04-16 ENCOUNTER — CLINICAL SUPPORT (OUTPATIENT)
Dept: AUDIOLOGY | Facility: CLINIC | Age: 38
End: 2018-04-16
Payer: COMMERCIAL

## 2018-04-16 VITALS — SYSTOLIC BLOOD PRESSURE: 144 MMHG | DIASTOLIC BLOOD PRESSURE: 82 MMHG | HEART RATE: 84 BPM | TEMPERATURE: 98 F

## 2018-04-16 DIAGNOSIS — H93.8X1 SENSATION OF FULLNESS IN RIGHT EAR: ICD-10-CM

## 2018-04-16 DIAGNOSIS — Z96.22 S/P MYRINGOTOMY WITH INSERTION OF TUBE: ICD-10-CM

## 2018-04-16 DIAGNOSIS — H65.04 ACUTE SEROUS OTITIS MEDIA, RECURRENT, RIGHT EAR: Primary | ICD-10-CM

## 2018-04-16 DIAGNOSIS — H90.6 MIXED CONDUCTIVE AND SENSORINEURAL HEARING LOSS, BILATERAL: Primary | ICD-10-CM

## 2018-04-16 PROCEDURE — 92557 COMPREHENSIVE HEARING TEST: CPT | Mod: S$GLB,,, | Performed by: AUDIOLOGIST

## 2018-04-16 PROCEDURE — 99213 OFFICE O/P EST LOW 20 MIN: CPT | Mod: S$GLB,,, | Performed by: OTOLARYNGOLOGY

## 2018-04-16 PROCEDURE — 99999 PR PBB SHADOW E&M-EST. PATIENT-LVL III: CPT | Mod: PBBFAC,,, | Performed by: OTOLARYNGOLOGY

## 2018-04-16 PROCEDURE — 92567 TYMPANOMETRY: CPT | Mod: S$GLB,,, | Performed by: AUDIOLOGIST

## 2018-04-16 NOTE — PROGRESS NOTES
Mr. Francisco May was seen in the clinic today for an audiological evaluation.   He reported fullness and decreased hearing of the right ear.    Audiological testing revealed a mild to moderate mixed hearing loss with excellent discrimination, bilaterally.      Tympanometry testing revealed a Type B tympanogram for the right ear and a Type C tympanogram for the left ear.      Recommendations:  1. Otologic evaluation  2. Recheck hearing following medical clearance  3. Hearing protection when in noise

## 2018-04-16 NOTE — PATIENT INSTRUCTIONS
Politzerization attempted; some AD aeration  Gentle middle ear insufflation techniques encouraged  Air travel/E.T. literature provided  Rx for prednisone 20 mg # 5; take one a day x 5 days with breakfast ( hold Mucinex D for now)   RTC next week for otologic evaluation ( before next flight 4/27/18)

## 2018-04-16 NOTE — PROGRESS NOTES
Subjective:       Patient ID: Francisco May II is a 37 y.o. male.    Chief Complaint: No chief complaint on file.    HPI: Mr. May is a 37-year-old -American male who was evaluated by me 3/28/18 for ear drainage.  He was diagnosed with a right serous otitis media condition.  He had completed a plane trip to [place] relatively recently.   Audiometry documented his bilateral 30 dB SRT scores and flat right tympanometric graph with a -200 pressure indicated for the left ear as well.    He was treated with an Augmentin course as well as politzerization and gentle middle ear insufflation techniques.  He indicates a very recent  popping and crackling sensation with improved hearing in his right ear over the past 2-3 days subsequent to finishing the antibiotic course.  He is utilizing Flonase spray.  He dissipates on the plane flight at the end of the following week April 27.  He has a history of a right myringotomy procedure performed for a right serous otitis media condition by a physician on the [facility] in early 2013.  Both ears were ultimately intubated.  Both tubes have since extruded.  He has a history of VITOR and has been worked up for airway obstruction.    He has been evaluated/treated  by our infectious disease service here for exposure to HIV.  His medication list includes Glucophage.    He is concerned about upcoming plane flight and 11 days since effect on his right ear specifically.      ALLERGIES: None known  History reviewed. No pertinent past medical history.  Current Outpatient Prescriptions on File Prior to Visit   Medication Sig Dispense Refill    buPROPion (WELLBUTRIN XL) 300 MG 24 hr tablet TK 1 T PO QD  2    ergocalciferol (ERGOCALCIFEROL) 50,000 unit Cap TK ONE C PO Q WEEK  3    metformin (GLUCOPHAGE-XR) 500 MG 24 hr tablet TK 1 T PO QD  2    testosterone cypionate (DEPOTESTOTERONE CYPIONATE) 200 mg/mL injection INJECT 1 ML IM Q WEEK  2     [DISCONTINUED] benzonatate (TESSALON) 100 MG capsule TAKE 1 CAPSULE BY MOUTH EVERY 8 HOURS AS NEEDED FOR COUGH AND CONGESTION  0    [DISCONTINUED] OTOVEL 0.3-0.025 % (0.25 mL) Soln instill contents OF ONE vial IN THE RIGHT EAR TWICE DAILY FOR 7 DAYS  0    emtricitabine-tenofovir 200-300 mg (TRUVADA) 200-300 mg Tab Take 1 tablet by mouth once daily. 30 tablet 3     No current facility-administered medications on file prior to visit.        Review of Systems        Objective:     Blood pressure 144/82 pulse 84 temperature 98.1  Gen.: Alert and oriented gentleman in no acute distress  Both ears were examined under the microscope in the micro-procedure  Physical Exam   HENT:   Ears:        Assessment:       1. Acute serous otitis media, recurrent, right ear      appears to be resolving   2. S/P myringotomy with insertion of tube           Plan:     Politzerization attempted; some AD aeration  Gentle middle ear insufflation techniques encouraged  Air travel/E.T. literature provided  Rx for prednisone 20 mg # 5; take one a day x 5 days with breakfast ( hold Mucinex D for now)   RTC next week for otologic evaluation ( before next flight 4/27)

## 2018-04-20 ENCOUNTER — PATIENT MESSAGE (OUTPATIENT)
Dept: INFECTIOUS DISEASES | Facility: CLINIC | Age: 38
End: 2018-04-20

## 2018-04-25 ENCOUNTER — OFFICE VISIT (OUTPATIENT)
Dept: OTOLARYNGOLOGY | Facility: CLINIC | Age: 38
End: 2018-04-25
Payer: COMMERCIAL

## 2018-04-25 VITALS — HEART RATE: 79 BPM | DIASTOLIC BLOOD PRESSURE: 80 MMHG | TEMPERATURE: 98 F | SYSTOLIC BLOOD PRESSURE: 137 MMHG

## 2018-04-25 DIAGNOSIS — Z86.69 HISTORY OF SEROUS OTITIS MEDIA: Primary | ICD-10-CM

## 2018-04-25 PROCEDURE — 99999 PR PBB SHADOW E&M-EST. PATIENT-LVL III: CPT | Mod: PBBFAC,,, | Performed by: OTOLARYNGOLOGY

## 2018-04-25 PROCEDURE — 99212 OFFICE O/P EST SF 10 MIN: CPT | Mod: S$GLB,,, | Performed by: OTOLARYNGOLOGY

## 2018-04-25 NOTE — PATIENT INSTRUCTIONS
No evidence of AD KYLER now  Gentle middle ear insufflation techniques encouraged/pt.may refer to Air Travel/E.T.literature  re:plane travel prn  RTC prn

## 2018-04-25 NOTE — PROGRESS NOTES
Subjective:       Patient ID: Francisco May II is a 37 y.o. male.    Chief Complaint: No chief complaint on file.    HPI: Mr. May is a 37-year-old gentleman who is about to take a flight this coming Friday( in 2 days) .  He was examined and treated by me 4/16/18 for a right serous otitis media condition noted after completion of a plane trip to [facility].    He was initially treated with an Augmentin course and heat indicated popping and cracking sensation of his right ear for 2 days prior to the visit.  He was utilizing Flonase spray.  He had ndicated his history of a right myringotomy procedure performed for a serous otitis media condition by a physician on the [facility] in early 2013.  In fact, both ears were intubated and both tubes have subsequently extruded.    He ndicates complete opening of his right ear now ( in the past several days) without any ear pain, ear pressure hearing loss symptoms presently.   He would like to be cleared to fly in 2 days.    His medical history includes VITOR workup.  His medication list includes clinical 5.  He is followed by infectious disease service for exposure to HIV.    History reviewed. No pertinent past medical history.  Current Outpatient Prescriptions on File Prior to Visit   Medication Sig Dispense Refill    buPROPion (WELLBUTRIN XL) 300 MG 24 hr tablet TK 1 T PO QD  2    ergocalciferol (ERGOCALCIFEROL) 50,000 unit Cap TK ONE C PO Q WEEK  3    metformin (GLUCOPHAGE-XR) 500 MG 24 hr tablet TK 1 T PO QD  2    testosterone cypionate (DEPOTESTOTERONE CYPIONATE) 200 mg/mL injection INJECT 1 ML IM Q WEEK  2    emtricitabine-tenofovir 200-300 mg (TRUVADA) 200-300 mg Tab Take 1 tablet by mouth once daily. 30 tablet 3     No current facility-administered medications on file prior to visit.        Review of Systems        Objective:     Blood pressure 137/80 pulse 79 temperature 98.3 height 5 feet 11 inches weight 275 pounds  Gen.:  Alert and oriented gentleman in no acute distress  Both ears were examined under the microscope in the micro-procedure room  Physical Exam   HENT:   Ears:        Assessment:       1. History of serous otitis media, AD most recently; now resolved      2.    Hx of bilateral myringotomies and PE intubation procedures   Plan:     No evidence of AD KYLER now  Gentle middle ear insufflation techniques encouraged/pt.may refer to Air Travel/E.T.literature  re:plane travel prn  RTC prn

## 2018-04-26 ENCOUNTER — OFFICE VISIT (OUTPATIENT)
Dept: INFECTIOUS DISEASES | Facility: CLINIC | Age: 38
End: 2018-04-26
Payer: COMMERCIAL

## 2018-04-26 VITALS
SYSTOLIC BLOOD PRESSURE: 147 MMHG | HEIGHT: 71 IN | TEMPERATURE: 98 F | BODY MASS INDEX: 40.09 KG/M2 | DIASTOLIC BLOOD PRESSURE: 82 MMHG | WEIGHT: 286.38 LBS | HEART RATE: 75 BPM

## 2018-04-26 DIAGNOSIS — Z11.3 SCREEN FOR STD (SEXUALLY TRANSMITTED DISEASE): ICD-10-CM

## 2018-04-26 DIAGNOSIS — Z20.6 HIV EXPOSURE FROM BODY FLUIDS: Primary | ICD-10-CM

## 2018-04-26 PROCEDURE — 99214 OFFICE O/P EST MOD 30 MIN: CPT | Mod: S$GLB,,, | Performed by: INTERNAL MEDICINE

## 2018-04-26 PROCEDURE — 87591 N.GONORRHOEAE DNA AMP PROB: CPT | Mod: 91

## 2018-04-26 PROCEDURE — 87491 CHLMYD TRACH DNA AMP PROBE: CPT

## 2018-04-26 PROCEDURE — 99999 PR PBB SHADOW E&M-EST. PATIENT-LVL III: CPT | Mod: PBBFAC,,, | Performed by: INTERNAL MEDICINE

## 2018-04-26 NOTE — PROGRESS NOTES
Subjective:      Patient ID: Francisco May II is a 37 y.o. male.    Chief Complaint:Follow-up    History of Present Illness    Mr. May is a 36 y/o ortiz male who I follow for PREP.  He was previously in a relationship with an HIV positive male.  They are no longer in a relationship.  He reports compliance with truvada.  He is here today reporting possible exposure to gonorrhea.  Was informed by a male partner.  He has been asymptomatic.    Review of Systems   All other systems reviewed and are negative.    Objective:   Physical Exam   Constitutional: He is oriented to person, place, and time. He appears well-developed and well-nourished. No distress.   HENT:   Head: Normocephalic and atraumatic.   Right Ear: External ear normal.   Left Ear: External ear normal.   Nose: Nose normal.   Mouth/Throat: Oropharynx is clear and moist. No oropharyngeal exudate.   Eyes: Conjunctivae and EOM are normal. Pupils are equal, round, and reactive to light. Right eye exhibits no discharge. Left eye exhibits no discharge. No scleral icterus.   Neck: Normal range of motion. Neck supple. No JVD present. No tracheal deviation present. No thyromegaly present.   Cardiovascular: Normal rate, regular rhythm, normal heart sounds and intact distal pulses.  Exam reveals no gallop and no friction rub.    No murmur heard.  Pulmonary/Chest: Effort normal and breath sounds normal. No stridor. No respiratory distress. He has no wheezes. He has no rales. He exhibits no tenderness.   Abdominal: Soft. Bowel sounds are normal. He exhibits no distension and no mass. There is no tenderness. There is no rebound and no guarding.   Musculoskeletal: Normal range of motion. He exhibits no edema or tenderness.   Lymphadenopathy:     He has no cervical adenopathy.   Neurological: He is alert and oriented to person, place, and time. He has normal reflexes. He displays normal reflexes. No cranial nerve deficit. He exhibits normal muscle tone.  Coordination normal.   Skin: Skin is warm. No rash noted. He is not diaphoretic. No erythema. No pallor.   Psychiatric: He has a normal mood and affect. His behavior is normal. Judgment and thought content normal.   Nursing note and vitals reviewed.    Assessment:       1. HIV exposure from body fluids    2. Screen for STD (sexually transmitted disease)        Counseled him about safe sex practices.  Stressed importance of condom use.  I will screen him for HIV and other STDs.  Given his report of possible exposure to gonorrhea, I will screen his anus, pharynx and urethra for infection with gonorrhea and chlamydia.  I will also screen him for syphilis.  He is to continue truvada for PREP.  Plan:       HIV exposure from body fluids  -     HIV-1 and HIV-2 antibodies; Future; Expected date: 04/26/2018  -     C. trachomatis/N. gonorrhoeae by AMP DNA Urine; Future; Expected date: 04/26/2018  -     RPR; Future; Expected date: 04/26/2018  -     Hepatitis B core antibody, total; Future; Expected date: 04/26/2018  -     Hepatitis B surface antibody; Future; Expected date: 04/26/2018  -     Hepatitis B surface antigen; Future; Expected date: 04/26/2018  -     Miscellaneous Sendout Test Other (Specify) (Anal swab); Future; Expected date: 04/26/2018  -     Miscellaneous Sendout Test Other (Specify) (anal swab); Future; Expected date: 04/26/2018  -     Miscellaneous Sendout Test Other (Specify) (pharyngeal swab); Future; Expected date: 04/26/2018  -     Miscellaneous Sendout Test Other (Specify) (pharyngeal swab); Future; Expected date: 04/26/2018    Screen for STD (sexually transmitted disease)  -     HIV-1 and HIV-2 antibodies; Future; Expected date: 04/26/2018  -     C. trachomatis/N. gonorrhoeae by AMP DNA Urine; Future; Expected date: 04/26/2018  -     RPR; Future; Expected date: 04/26/2018  -     Hepatitis B core antibody, total; Future; Expected date: 04/26/2018  -     Hepatitis B surface antibody; Future; Expected date:  04/26/2018  -     Hepatitis B surface antigen; Future; Expected date: 04/26/2018  -     Miscellaneous Sendout Test Other (Specify) (Anal swab); Future; Expected date: 04/26/2018  -     Miscellaneous Sendout Test Other (Specify) (anal swab); Future; Expected date: 04/26/2018  -     Miscellaneous Sendout Test Other (Specify) (pharyngeal swab); Future; Expected date: 04/26/2018  -     Miscellaneous Sendout Test Other (Specify) (pharyngeal swab); Future; Expected date: 04/26/2018    Other orders  -     Misc. C trach/N gonor Amplified RNA  -     Misc. C trach/N gonor Amplified RNA

## 2018-04-27 LAB
C TRACH DNA SPEC QL NAA+PROBE: NOT DETECTED
C.TRACH RNA SOURCE: NORMAL
C.TRACH RNA SOURCE: NORMAL
C.TRACH,MISC. AMP RNA: NEGATIVE
C.TRACH,MISC. AMP RNA: NEGATIVE
N GONORRHOEA DNA SPEC QL NAA+PROBE: NOT DETECTED
N.GONORROHEAE, AMP RNA SOURCE: NORMAL
N.GONORROHEAE, AMP RNA SOURCE: NORMAL
N.GONORROHEAE, MISC. AMP RNA: NEGATIVE
N.GONORROHEAE, MISC. AMP RNA: NEGATIVE

## 2018-05-03 ENCOUNTER — PATIENT MESSAGE (OUTPATIENT)
Dept: INFECTIOUS DISEASES | Facility: CLINIC | Age: 38
End: 2018-05-03

## 2018-05-07 DIAGNOSIS — Z20.6 HIV EXPOSURE: ICD-10-CM

## 2018-05-07 DIAGNOSIS — Z71.7 ENCOUNTER FOR HIV COUNSELING: ICD-10-CM

## 2018-05-07 RX ORDER — EMTRICITABINE AND TENOFOVIR DISOPROXIL FUMARATE 200; 300 MG/1; MG/1
1 TABLET, FILM COATED ORAL DAILY
Qty: 30 TABLET | Refills: 2 | Status: SHIPPED | OUTPATIENT
Start: 2018-05-07 | End: 2018-11-12 | Stop reason: SDUPTHER

## 2018-11-02 ENCOUNTER — LAB VISIT (OUTPATIENT)
Dept: LAB | Facility: HOSPITAL | Age: 38
End: 2018-11-02
Attending: INTERNAL MEDICINE
Payer: COMMERCIAL

## 2018-11-02 ENCOUNTER — OFFICE VISIT (OUTPATIENT)
Dept: INFECTIOUS DISEASES | Facility: CLINIC | Age: 38
End: 2018-11-02
Payer: COMMERCIAL

## 2018-11-02 VITALS
TEMPERATURE: 98 F | SYSTOLIC BLOOD PRESSURE: 131 MMHG | DIASTOLIC BLOOD PRESSURE: 78 MMHG | HEART RATE: 78 BPM | WEIGHT: 264.13 LBS | BODY MASS INDEX: 36.98 KG/M2 | HEIGHT: 71 IN

## 2018-11-02 DIAGNOSIS — R03.0 PRE-HYPERTENSION: ICD-10-CM

## 2018-11-02 DIAGNOSIS — Z00.00 HEALTH MAINTENANCE EXAMINATION: ICD-10-CM

## 2018-11-02 DIAGNOSIS — F32.89 OTHER DEPRESSION: ICD-10-CM

## 2018-11-02 DIAGNOSIS — Z20.6 HIV EXPOSURE: ICD-10-CM

## 2018-11-02 DIAGNOSIS — R03.0 PRE-HYPERTENSION: Primary | ICD-10-CM

## 2018-11-02 LAB
ALBUMIN SERPL BCP-MCNC: 3.9 G/DL
ALP SERPL-CCNC: 76 U/L
ALT SERPL W/O P-5'-P-CCNC: 19 U/L
ANION GAP SERPL CALC-SCNC: 6 MMOL/L
AST SERPL-CCNC: 19 U/L
BASOPHILS # BLD AUTO: 0.09 K/UL
BASOPHILS NFR BLD: 1.3 %
BILIRUB SERPL-MCNC: 0.9 MG/DL
BUN SERPL-MCNC: 14 MG/DL
CALCIUM SERPL-MCNC: 9.5 MG/DL
CHLORIDE SERPL-SCNC: 104 MMOL/L
CHOLEST SERPL-MCNC: 183 MG/DL
CHOLEST/HDLC SERPL: 4.2 {RATIO}
CO2 SERPL-SCNC: 28 MMOL/L
CREAT SERPL-MCNC: 1 MG/DL
DIFFERENTIAL METHOD: NORMAL
EOSINOPHIL # BLD AUTO: 0.4 K/UL
EOSINOPHIL NFR BLD: 5.7 %
ERYTHROCYTE [DISTWIDTH] IN BLOOD BY AUTOMATED COUNT: 12.6 %
EST. GFR  (AFRICAN AMERICAN): >60 ML/MIN/1.73 M^2
EST. GFR  (NON AFRICAN AMERICAN): >60 ML/MIN/1.73 M^2
ESTIMATED AVG GLUCOSE: 105 MG/DL
GLUCOSE SERPL-MCNC: 96 MG/DL
HBA1C MFR BLD HPLC: 5.3 %
HCT VFR BLD AUTO: 48.6 %
HDLC SERPL-MCNC: 44 MG/DL
HDLC SERPL: 24 %
HGB BLD-MCNC: 16.1 G/DL
HIV 1+2 AB+HIV1 P24 AG SERPL QL IA: NEGATIVE
IMM GRANULOCYTES # BLD AUTO: 0.01 K/UL
IMM GRANULOCYTES NFR BLD AUTO: 0.1 %
LDLC SERPL CALC-MCNC: 110.8 MG/DL
LYMPHOCYTES # BLD AUTO: 1.9 K/UL
LYMPHOCYTES NFR BLD: 29 %
MCH RBC QN AUTO: 29.2 PG
MCHC RBC AUTO-ENTMCNC: 33.1 G/DL
MCV RBC AUTO: 88 FL
MONOCYTES # BLD AUTO: 0.5 K/UL
MONOCYTES NFR BLD: 7.9 %
NEUTROPHILS # BLD AUTO: 3.7 K/UL
NEUTROPHILS NFR BLD: 56 %
NONHDLC SERPL-MCNC: 139 MG/DL
NRBC BLD-RTO: 0 /100 WBC
PLATELET # BLD AUTO: 285 K/UL
PMV BLD AUTO: 10.1 FL
POTASSIUM SERPL-SCNC: 4.2 MMOL/L
PROT SERPL-MCNC: 7.1 G/DL
RBC # BLD AUTO: 5.52 M/UL
SODIUM SERPL-SCNC: 138 MMOL/L
TRIGL SERPL-MCNC: 141 MG/DL
WBC # BLD AUTO: 6.69 K/UL

## 2018-11-02 PROCEDURE — 85025 COMPLETE CBC W/AUTO DIFF WBC: CPT

## 2018-11-02 PROCEDURE — 99999 PR PBB SHADOW E&M-EST. PATIENT-LVL III: CPT | Mod: PBBFAC,,, | Performed by: INTERNAL MEDICINE

## 2018-11-02 PROCEDURE — 87491 CHLMYD TRACH DNA AMP PROBE: CPT

## 2018-11-02 PROCEDURE — 80053 COMPREHEN METABOLIC PANEL: CPT

## 2018-11-02 PROCEDURE — 3008F BODY MASS INDEX DOCD: CPT | Mod: CPTII,S$GLB,, | Performed by: INTERNAL MEDICINE

## 2018-11-02 PROCEDURE — 80061 LIPID PANEL: CPT

## 2018-11-02 PROCEDURE — 99213 OFFICE O/P EST LOW 20 MIN: CPT | Mod: S$GLB,,, | Performed by: INTERNAL MEDICINE

## 2018-11-02 PROCEDURE — 84270 ASSAY OF SEX HORMONE GLOBUL: CPT

## 2018-11-02 PROCEDURE — 86780 TREPONEMA PALLIDUM: CPT

## 2018-11-02 PROCEDURE — 86592 SYPHILIS TEST NON-TREP QUAL: CPT

## 2018-11-02 PROCEDURE — 86703 HIV-1/HIV-2 1 RESULT ANTBDY: CPT

## 2018-11-02 PROCEDURE — 82040 ASSAY OF SERUM ALBUMIN: CPT

## 2018-11-02 PROCEDURE — 86593 SYPHILIS TEST NON-TREP QUANT: CPT

## 2018-11-02 PROCEDURE — 83036 HEMOGLOBIN GLYCOSYLATED A1C: CPT

## 2018-11-02 RX ORDER — CITALOPRAM 20 MG/1
20 TABLET, FILM COATED ORAL DAILY
Qty: 30 TABLET | Refills: 2 | Status: SHIPPED | OUTPATIENT
Start: 2018-11-02 | End: 2021-03-11

## 2018-11-02 NOTE — PROGRESS NOTES
Subjective:      Patient ID: Francisco May II is a 38 y.o. male.    Chief Complaint:Follow-up    History of Present Illness    Mr. May is a 39 y/o male who I see for PREP.  Since his last visit, he lost insurance but regained it back recently.  Has been off of all medication for 3 months.  Last his job at [employer].  Got insurance back and requesting that I become his primary care doctor.  Started eating more vegetables after told his BP was high.  He has now lost 20 lbs.  He has started being more active.  He wants to continue loosing weight.  He is dealing with some depression and has started seeing his therapist.    Medical History  Hypotestosteronism (off of testosterone shots for 2-3 months).  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  Traveling a lot.      Review of Systems   Constitution: Negative for chills, decreased appetite, fever, weakness, malaise/fatigue, night sweats, weight gain and weight loss.   HENT: Negative for congestion, ear pain, hearing loss, hoarse voice, sore throat and tinnitus.    Eyes: Negative for blurred vision, redness and visual disturbance.   Cardiovascular: Negative for chest pain, leg swelling and palpitations.   Respiratory: Negative for cough, hemoptysis, shortness of breath and sputum production.    Hematologic/Lymphatic: Negative for adenopathy. Does not bruise/bleed easily.   Skin: Negative for dry skin, itching, rash and suspicious lesions.   Musculoskeletal: Negative for back pain, joint pain, myalgias and neck pain.   Gastrointestinal: Negative for abdominal pain, constipation, diarrhea, heartburn, nausea and vomiting.   Genitourinary: Negative for dysuria, flank pain, frequency, hematuria, hesitancy and urgency.   Neurological: Negative for dizziness, headaches, numbness and paresthesias.   Psychiatric/Behavioral: Negative for depression and memory loss. The patient does not have insomnia and is not nervous/anxious.       Objective:   Physical Exam   Constitutional: He is oriented to person, place, and time. He appears well-developed and well-nourished. No distress.   HENT:   Head: Normocephalic and atraumatic.   Right Ear: External ear normal.   Left Ear: External ear normal.   Nose: Nose normal.   Mouth/Throat: Oropharynx is clear and moist. No oropharyngeal exudate.   Eyes: Conjunctivae and EOM are normal. Pupils are equal, round, and reactive to light. Right eye exhibits no discharge. Left eye exhibits no discharge. No scleral icterus.   Neck: Normal range of motion. Neck supple. No JVD present. No tracheal deviation present. No thyromegaly present.   Cardiovascular: Normal rate, regular rhythm and intact distal pulses. Exam reveals no gallop and no friction rub.   No murmur heard.  Pulmonary/Chest: Effort normal and breath sounds normal. No stridor. No respiratory distress. He has no wheezes. He has no rales. He exhibits no tenderness.   Abdominal: Soft. Bowel sounds are normal. He exhibits no distension and no mass. There is no tenderness. There is no rebound and no guarding.   Musculoskeletal: Normal range of motion. He exhibits no edema or tenderness.   Lymphadenopathy:     He has no cervical adenopathy.   Neurological: He is alert and oriented to person, place, and time. He has normal reflexes. He displays normal reflexes. No cranial nerve deficit. He exhibits normal muscle tone. Coordination normal.   Skin: Skin is warm. No rash noted. He is not diaphoretic. No erythema. No pallor.   Psychiatric: He has a normal mood and affect. His behavior is normal. Judgment and thought content normal.   Nursing note and vitals reviewed.    Assessment:       1. Pre-hypertension :  Will check labs today.  Will encourage continued diet and exercise.  Will have him follow up in 3 months.   2. HIV exposure :  Will check HIV test and if negative then will re-start PREP.   3. Other depression :  Will start celexa.   4. Health maintenance  examination :  Will check fasting lipids and screen for diabetes.         Plan:       Pre-hypertension  -     Comprehensive metabolic panel; Future; Expected date: 11/02/2018  -     CBC auto differential; Future; Expected date: 11/02/2018  -     TESTOSTERONE PANEL; Future; Expected date: 11/02/2018  -     Hemoglobin A1c; Future; Expected date: 11/02/2018  -     Lipid panel; Future; Expected date: 11/02/2018  -     Urinalysis; Future; Expected date: 11/02/2018    HIV exposure  -     HIV-1 and HIV-2 antibodies; Future; Expected date: 11/02/2018  -     RPR; Future; Expected date: 11/02/2018  -     C. trachomatis/N. gonorrhoeae by AMP DNA; Future; Expected date: 11/02/2018    Other depression  -     citalopram (CELEXA) 20 MG tablet; Take 1 tablet (20 mg total) by mouth once daily.  Dispense: 30 tablet; Refill: 2    Health maintenance examination  -     Comprehensive metabolic panel; Future; Expected date: 11/02/2018  -     CBC auto differential; Future; Expected date: 11/02/2018  -     Hemoglobin A1c; Future; Expected date: 11/02/2018  -     Lipid panel; Future; Expected date: 11/02/2018  -     Urinalysis; Future; Expected date: 11/02/2018

## 2018-11-03 LAB
C TRACH DNA SPEC QL NAA+PROBE: NOT DETECTED
N GONORRHOEA DNA SPEC QL NAA+PROBE: NOT DETECTED

## 2018-11-05 LAB
RPR SER QL: REACTIVE
RPR SER-TITR: ABNORMAL {TITER}

## 2018-11-07 LAB
ALBUMIN SERPL-MCNC: 4.4 G/DL (ref 3.6–5.1)
SHBG SERPL-SCNC: 14 NMOL/L (ref 10–50)
T PALLIDUM AB SER QL IF: REACTIVE
TESTOST FREE SERPL-MCNC: 54.5 PG/ML (ref 46–224)
TESTOST SERPL-MCNC: 238 NG/DL (ref 250–1100)
TESTOSTERONE.FREE+WB SERPL-MCNC: 109.7 NG/DL (ref 110–575)

## 2018-11-12 ENCOUNTER — TELEPHONE (OUTPATIENT)
Dept: INFECTIOUS DISEASES | Facility: CLINIC | Age: 38
End: 2018-11-12

## 2018-11-12 DIAGNOSIS — Z71.7 ENCOUNTER FOR HIV COUNSELING: ICD-10-CM

## 2018-11-12 DIAGNOSIS — Z20.6 HIV EXPOSURE: ICD-10-CM

## 2018-11-12 DIAGNOSIS — E29.1 HYPOTESTOSTERONEMIA IN MALE: Primary | ICD-10-CM

## 2018-11-12 RX ORDER — EMTRICITABINE AND TENOFOVIR DISOPROXIL FUMARATE 200; 300 MG/1; MG/1
1 TABLET, FILM COATED ORAL DAILY
Qty: 30 TABLET | Refills: 2 | Status: SHIPPED | OUTPATIENT
Start: 2018-11-12 | End: 2021-03-09

## 2018-11-12 NOTE — TELEPHONE ENCOUNTER
Labs reviewed with the patient.  Testosterone levels are marginally low.  Referral ordered for endocrinology.  Please schedule appointment.

## 2018-11-13 ENCOUNTER — PATIENT MESSAGE (OUTPATIENT)
Dept: INFECTIOUS DISEASES | Facility: CLINIC | Age: 38
End: 2018-11-13

## 2018-11-27 ENCOUNTER — PATIENT MESSAGE (OUTPATIENT)
Dept: SLEEP MEDICINE | Facility: CLINIC | Age: 38
End: 2018-11-27

## 2018-11-28 ENCOUNTER — OFFICE VISIT (OUTPATIENT)
Dept: NEUROLOGY | Facility: CLINIC | Age: 38
End: 2018-11-28
Payer: COMMERCIAL

## 2018-11-28 VITALS
DIASTOLIC BLOOD PRESSURE: 90 MMHG | HEART RATE: 80 BPM | BODY MASS INDEX: 36.33 KG/M2 | SYSTOLIC BLOOD PRESSURE: 140 MMHG | WEIGHT: 259.5 LBS | HEIGHT: 71 IN

## 2018-11-28 DIAGNOSIS — G47.33 OBSTRUCTIVE SLEEP APNEA: Primary | ICD-10-CM

## 2018-11-28 PROCEDURE — 3008F BODY MASS INDEX DOCD: CPT | Mod: CPTII,S$GLB,, | Performed by: NURSE PRACTITIONER

## 2018-11-28 PROCEDURE — 99999 PR PBB SHADOW E&M-EST. PATIENT-LVL III: CPT | Mod: PBBFAC,,, | Performed by: NURSE PRACTITIONER

## 2018-11-28 PROCEDURE — 99213 OFFICE O/P EST LOW 20 MIN: CPT | Mod: S$GLB,,, | Performed by: NURSE PRACTITIONER

## 2018-11-28 NOTE — PROGRESS NOTES
"Francisco May II was seen as f/u today for management of obstructive sleep apnea.     He resumed his cpap 15cm use after last visit when he got new FFM He loves it. No more suffocating sensation. Mood better place now than last seen. 17# loss. ESS=3 . Using therapy nightly except when travels. Used to always travel with machine but due to wgt loss he is sleeping better.     Interrogation- resmed M series. Alesia view. 774h total, manometer 16.7cm      HISTORY  Jan 2018   Francisco May II a 38 y.o.  male presents for the management of obstructive sleep apnea. Per records "He indicates his failure to gain compliance with CPAP using treatment for his VITOR problem.  He has tried utolizing a full face mask with different pressures.  He was studied at an outside facility" Study was 2010. Was always hard to fall asleep with big FFM on. Could never use it. Would awake suffocating sensation. Stopped using it ~ 5 yrs ago. W/o mask +snores and + daytime sleepiness/tired on a regular basis. He struggles in the am, gets to work later than he would like. Denies am headaches. When aroused from sleep has to take deep breath and his heart is racing. +witnessed apneic pauses. Nocturia 2x. Gained 45# past 1. 5yrs.   Energy level is low. Wishes he could be up at 6a and ride bike/exercise.     Denies symptoms of restless legs or kicking during sleep.     On todays Cranks Sleepiness Scale the patient scores a 18/24.     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  Otherwise, a balance of 10 systems reviewed is negative    PHYSICAL EXAM:   BP (!) 140/90   Pulse 80   Ht 5' 11" (1.803 m)   Wt 117.7 kg (259 lb 7.7 oz)   BMI 36.19 kg/m²   GENERAL: Obese body " habitus, well groomed     ASSESSMENT:   VITOR, unknown severity, dx'd 2010 outside facility/no records to review today. Was intolerable to using cpap ?pressure after setup, smothering sensation/mask interface.He had no adherence monitoring. Ready to resume. Persistent symptoms of snoring/apneic pauses/daytime sleepiness/dozing off affecting work. 11/28/18: Adherent with CPAP, benefiting from therapy. Using old machine w/o ahi capability, eligible for new machine but no outside PSG    PLAN:   1. Home sleep study, plan cpap/apap new machine after resulted with adherence monitoring after setup. Continue cpap 15cm in interim. [facility] DME prn supplies   2. Discussed etiology of VITOR and potential ramifications of untreated VITOR, including stroke, heart disease, HTN.   3. Encouraged continued weight loss efforts for potential improvement of VITOR and overall health benefits

## 2018-12-05 ENCOUNTER — TELEPHONE (OUTPATIENT)
Dept: SLEEP MEDICINE | Facility: OTHER | Age: 38
End: 2018-12-05

## 2018-12-19 ENCOUNTER — TELEPHONE (OUTPATIENT)
Dept: SLEEP MEDICINE | Facility: OTHER | Age: 38
End: 2018-12-19

## 2018-12-28 ENCOUNTER — TELEPHONE (OUTPATIENT)
Dept: SLEEP MEDICINE | Facility: OTHER | Age: 38
End: 2018-12-28

## 2019-01-22 ENCOUNTER — TELEPHONE (OUTPATIENT)
Dept: SLEEP MEDICINE | Facility: OTHER | Age: 39
End: 2019-01-22

## 2019-01-23 ENCOUNTER — TELEPHONE (OUTPATIENT)
Dept: SLEEP MEDICINE | Facility: OTHER | Age: 39
End: 2019-01-23

## 2019-01-23 NOTE — TELEPHONE ENCOUNTER
Patient canceled home sleep study for Jan 23rd due to cost.  He will call back when he can reschedule.

## 2019-02-28 ENCOUNTER — TELEPHONE (OUTPATIENT)
Dept: SLEEP MEDICINE | Facility: OTHER | Age: 39
End: 2019-02-28

## 2019-02-28 NOTE — TELEPHONE ENCOUNTER
Patient canceled home sleep study a while back due to the cost.  He will call us when he can reschedule.

## 2021-02-25 ENCOUNTER — OFFICE VISIT (OUTPATIENT)
Dept: INFECTIOUS DISEASES | Facility: CLINIC | Age: 41
End: 2021-02-25
Payer: COMMERCIAL

## 2021-02-25 VITALS
WEIGHT: 278.88 LBS | SYSTOLIC BLOOD PRESSURE: 151 MMHG | HEIGHT: 71 IN | BODY MASS INDEX: 39.04 KG/M2 | DIASTOLIC BLOOD PRESSURE: 87 MMHG | TEMPERATURE: 99 F | HEART RATE: 101 BPM

## 2021-02-25 DIAGNOSIS — R21 RASH: ICD-10-CM

## 2021-02-25 DIAGNOSIS — I10 ESSENTIAL HYPERTENSION: Primary | ICD-10-CM

## 2021-02-25 DIAGNOSIS — R73.9 HYPERGLYCEMIA: ICD-10-CM

## 2021-02-25 DIAGNOSIS — L50.9 URTICARIA: ICD-10-CM

## 2021-02-25 DIAGNOSIS — H93.8X3 SENSATION OF FULLNESS IN BOTH EARS: ICD-10-CM

## 2021-02-25 DIAGNOSIS — Z11.3 SCREEN FOR STD (SEXUALLY TRANSMITTED DISEASE): ICD-10-CM

## 2021-02-25 DIAGNOSIS — E29.1 HYPOGONADISM IN MALE: ICD-10-CM

## 2021-02-25 DIAGNOSIS — Z00.00 HEALTHCARE MAINTENANCE: ICD-10-CM

## 2021-02-25 LAB
BILIRUB UR QL STRIP: NEGATIVE
CLARITY UR REFRACT.AUTO: CLEAR
COLOR UR AUTO: YELLOW
GLUCOSE UR QL STRIP: NEGATIVE
HGB UR QL STRIP: NEGATIVE
KETONES UR QL STRIP: NEGATIVE
LEUKOCYTE ESTERASE UR QL STRIP: NEGATIVE
NITRITE UR QL STRIP: NEGATIVE
PH UR STRIP: 6 [PH] (ref 5–8)
PROT UR QL STRIP: NEGATIVE
SP GR UR STRIP: 1.02 (ref 1–1.03)
URN SPEC COLLECT METH UR: NORMAL

## 2021-02-25 PROCEDURE — 3077F SYST BP >= 140 MM HG: CPT | Mod: CPTII,S$GLB,, | Performed by: INTERNAL MEDICINE

## 2021-02-25 PROCEDURE — 87491 CHLMYD TRACH DNA AMP PROBE: CPT

## 2021-02-25 PROCEDURE — 1126F AMNT PAIN NOTED NONE PRSNT: CPT | Mod: S$GLB,,, | Performed by: INTERNAL MEDICINE

## 2021-02-25 PROCEDURE — 1126F PR PAIN SEVERITY QUANTIFIED, NO PAIN PRESENT: ICD-10-PCS | Mod: S$GLB,,, | Performed by: INTERNAL MEDICINE

## 2021-02-25 PROCEDURE — 99214 OFFICE O/P EST MOD 30 MIN: CPT | Mod: S$GLB,,, | Performed by: INTERNAL MEDICINE

## 2021-02-25 PROCEDURE — 3079F PR MOST RECENT DIASTOLIC BLOOD PRESSURE 80-89 MM HG: ICD-10-PCS | Mod: CPTII,S$GLB,, | Performed by: INTERNAL MEDICINE

## 2021-02-25 PROCEDURE — 87591 N.GONORRHOEAE DNA AMP PROB: CPT

## 2021-02-25 PROCEDURE — 3079F DIAST BP 80-89 MM HG: CPT | Mod: CPTII,S$GLB,, | Performed by: INTERNAL MEDICINE

## 2021-02-25 PROCEDURE — 3008F BODY MASS INDEX DOCD: CPT | Mod: CPTII,S$GLB,, | Performed by: INTERNAL MEDICINE

## 2021-02-25 PROCEDURE — 3077F PR MOST RECENT SYSTOLIC BLOOD PRESSURE >= 140 MM HG: ICD-10-PCS | Mod: CPTII,S$GLB,, | Performed by: INTERNAL MEDICINE

## 2021-02-25 PROCEDURE — 99214 PR OFFICE/OUTPT VISIT, EST, LEVL IV, 30-39 MIN: ICD-10-PCS | Mod: S$GLB,,, | Performed by: INTERNAL MEDICINE

## 2021-02-25 PROCEDURE — 3008F PR BODY MASS INDEX (BMI) DOCUMENTED: ICD-10-PCS | Mod: CPTII,S$GLB,, | Performed by: INTERNAL MEDICINE

## 2021-02-25 PROCEDURE — 81003 URINALYSIS AUTO W/O SCOPE: CPT

## 2021-02-25 PROCEDURE — 99999 PR PBB SHADOW E&M-EST. PATIENT-LVL IV: ICD-10-PCS | Mod: PBBFAC,,, | Performed by: INTERNAL MEDICINE

## 2021-02-25 PROCEDURE — 99999 PR PBB SHADOW E&M-EST. PATIENT-LVL IV: CPT | Mod: PBBFAC,,, | Performed by: INTERNAL MEDICINE

## 2021-02-25 RX ORDER — HYDROXYZINE HYDROCHLORIDE 25 MG/1
25 TABLET, FILM COATED ORAL 3 TIMES DAILY
Qty: 90 TABLET | Refills: 1 | Status: SHIPPED | OUTPATIENT
Start: 2021-02-25 | End: 2021-04-26

## 2021-02-26 ENCOUNTER — LAB VISIT (OUTPATIENT)
Dept: LAB | Facility: HOSPITAL | Age: 41
End: 2021-02-26
Attending: INTERNAL MEDICINE
Payer: COMMERCIAL

## 2021-02-26 ENCOUNTER — CLINICAL SUPPORT (OUTPATIENT)
Dept: INFECTIOUS DISEASES | Facility: CLINIC | Age: 41
End: 2021-02-26
Payer: COMMERCIAL

## 2021-02-26 VITALS
BODY MASS INDEX: 38.4 KG/M2 | HEART RATE: 72 BPM | DIASTOLIC BLOOD PRESSURE: 90 MMHG | TEMPERATURE: 98 F | SYSTOLIC BLOOD PRESSURE: 138 MMHG | WEIGHT: 274.25 LBS | HEIGHT: 71 IN

## 2021-02-26 DIAGNOSIS — Z00.00 HEALTHCARE MAINTENANCE: ICD-10-CM

## 2021-02-26 DIAGNOSIS — R21 RASH: ICD-10-CM

## 2021-02-26 DIAGNOSIS — E29.1 HYPOGONADISM IN MALE: ICD-10-CM

## 2021-02-26 DIAGNOSIS — I10 ESSENTIAL HYPERTENSION: ICD-10-CM

## 2021-02-26 DIAGNOSIS — R73.9 HYPERGLYCEMIA: ICD-10-CM

## 2021-02-26 DIAGNOSIS — Z11.3 SCREEN FOR STD (SEXUALLY TRANSMITTED DISEASE): ICD-10-CM

## 2021-02-26 LAB
BASOPHILS # BLD AUTO: 0.05 K/UL (ref 0–0.2)
BASOPHILS NFR BLD: 1 % (ref 0–1.9)
C TRACH DNA SPEC QL NAA+PROBE: NOT DETECTED
DIFFERENTIAL METHOD: NORMAL
EOSINOPHIL # BLD AUTO: 0.2 K/UL (ref 0–0.5)
EOSINOPHIL NFR BLD: 4 % (ref 0–8)
ERYTHROCYTE [DISTWIDTH] IN BLOOD BY AUTOMATED COUNT: 13.4 % (ref 11.5–14.5)
ESTIMATED AVG GLUCOSE: 105 MG/DL (ref 68–131)
HBA1C MFR BLD: 5.3 % (ref 4–5.6)
HBV CORE AB SERPL QL IA: NEGATIVE
HBV SURFACE AB SER-ACNC: POSITIVE M[IU]/ML
HBV SURFACE AG SERPL QL IA: NEGATIVE
HCT VFR BLD AUTO: 45.9 % (ref 40–54)
HCV AB SERPL QL IA: NEGATIVE
HGB BLD-MCNC: 15.4 G/DL (ref 14–18)
IMM GRANULOCYTES # BLD AUTO: 0.02 K/UL (ref 0–0.04)
IMM GRANULOCYTES NFR BLD AUTO: 0.4 % (ref 0–0.5)
LYMPHOCYTES # BLD AUTO: 1.7 K/UL (ref 1–4.8)
LYMPHOCYTES NFR BLD: 34.9 % (ref 18–48)
MCH RBC QN AUTO: 29.4 PG (ref 27–31)
MCHC RBC AUTO-ENTMCNC: 33.6 G/DL (ref 32–36)
MCV RBC AUTO: 88 FL (ref 82–98)
MONOCYTES # BLD AUTO: 0.5 K/UL (ref 0.3–1)
MONOCYTES NFR BLD: 9.6 % (ref 4–15)
N GONORRHOEA DNA SPEC QL NAA+PROBE: NOT DETECTED
NEUTROPHILS # BLD AUTO: 2.5 K/UL (ref 1.8–7.7)
NEUTROPHILS NFR BLD: 50.1 % (ref 38–73)
NRBC BLD-RTO: 0 /100 WBC
PLATELET # BLD AUTO: 231 K/UL (ref 150–350)
PMV BLD AUTO: 9.7 FL (ref 9.2–12.9)
RBC # BLD AUTO: 5.23 M/UL (ref 4.6–6.2)
WBC # BLD AUTO: 4.98 K/UL (ref 3.9–12.7)

## 2021-02-26 PROCEDURE — 80061 LIPID PANEL: CPT

## 2021-02-26 PROCEDURE — 99999 PR PBB SHADOW E&M-EST. PATIENT-LVL II: CPT | Mod: PBBFAC,,, | Performed by: INTERNAL MEDICINE

## 2021-02-26 PROCEDURE — 90471 IMMUNIZATION ADMIN: CPT | Mod: S$GLB,,, | Performed by: INTERNAL MEDICINE

## 2021-02-26 PROCEDURE — 86780 TREPONEMA PALLIDUM: CPT

## 2021-02-26 PROCEDURE — 86593 SYPHILIS TEST NON-TREP QUANT: CPT

## 2021-02-26 PROCEDURE — 86592 SYPHILIS TEST NON-TREP QUAL: CPT

## 2021-02-26 PROCEDURE — 83036 HEMOGLOBIN GLYCOSYLATED A1C: CPT

## 2021-02-26 PROCEDURE — 84403 ASSAY OF TOTAL TESTOSTERONE: CPT

## 2021-02-26 PROCEDURE — 86803 HEPATITIS C AB TEST: CPT

## 2021-02-26 PROCEDURE — 86703 HIV-1/HIV-2 1 RESULT ANTBDY: CPT

## 2021-02-26 PROCEDURE — 86706 HEP B SURFACE ANTIBODY: CPT

## 2021-02-26 PROCEDURE — 86704 HEP B CORE ANTIBODY TOTAL: CPT

## 2021-02-26 PROCEDURE — 87389 HIV-1 AG W/HIV-1&-2 AB AG IA: CPT | Performed by: INTERNAL MEDICINE

## 2021-02-26 PROCEDURE — 90715 TDAP VACCINE GREATER THAN OR EQUAL TO 7YO IM: ICD-10-PCS | Mod: S$GLB,,, | Performed by: INTERNAL MEDICINE

## 2021-02-26 PROCEDURE — 86790 VIRUS ANTIBODY NOS: CPT

## 2021-02-26 PROCEDURE — 82306 VITAMIN D 25 HYDROXY: CPT

## 2021-02-26 PROCEDURE — 36415 COLL VENOUS BLD VENIPUNCTURE: CPT

## 2021-02-26 PROCEDURE — 80053 COMPREHEN METABOLIC PANEL: CPT

## 2021-02-26 PROCEDURE — 90715 TDAP VACCINE 7 YRS/> IM: CPT | Mod: S$GLB,,, | Performed by: INTERNAL MEDICINE

## 2021-02-26 PROCEDURE — 87340 HEPATITIS B SURFACE AG IA: CPT

## 2021-02-26 PROCEDURE — 99999 PR PBB SHADOW E&M-EST. PATIENT-LVL II: ICD-10-PCS | Mod: PBBFAC,,, | Performed by: INTERNAL MEDICINE

## 2021-02-26 PROCEDURE — 90471 TDAP VACCINE GREATER THAN OR EQUAL TO 7YO IM: ICD-10-PCS | Mod: S$GLB,,, | Performed by: INTERNAL MEDICINE

## 2021-02-26 PROCEDURE — 85025 COMPLETE CBC W/AUTO DIFF WBC: CPT

## 2021-02-27 LAB
25(OH)D3+25(OH)D2 SERPL-MCNC: 12 NG/ML (ref 30–96)
ALBUMIN SERPL BCP-MCNC: 4.3 G/DL (ref 3.5–5.2)
ALP SERPL-CCNC: 69 U/L (ref 55–135)
ALT SERPL W/O P-5'-P-CCNC: 29 U/L (ref 10–44)
ANION GAP SERPL CALC-SCNC: 12 MMOL/L (ref 8–16)
AST SERPL-CCNC: 27 U/L (ref 10–40)
BILIRUB SERPL-MCNC: 0.9 MG/DL (ref 0.1–1)
BUN SERPL-MCNC: 11 MG/DL (ref 6–20)
CALCIUM SERPL-MCNC: 8.9 MG/DL (ref 8.7–10.5)
CHLORIDE SERPL-SCNC: 102 MMOL/L (ref 95–110)
CHOLEST SERPL-MCNC: 196 MG/DL (ref 120–199)
CHOLEST/HDLC SERPL: 4.7 {RATIO} (ref 2–5)
CO2 SERPL-SCNC: 24 MMOL/L (ref 23–29)
CREAT SERPL-MCNC: 1.1 MG/DL (ref 0.5–1.4)
EST. GFR  (AFRICAN AMERICAN): >60 ML/MIN/1.73 M^2
EST. GFR  (NON AFRICAN AMERICAN): >60 ML/MIN/1.73 M^2
GLUCOSE SERPL-MCNC: 90 MG/DL (ref 70–110)
HDLC SERPL-MCNC: 42 MG/DL (ref 40–75)
HDLC SERPL: 21.4 % (ref 20–50)
LDLC SERPL CALC-MCNC: 116.6 MG/DL (ref 63–159)
NONHDLC SERPL-MCNC: 154 MG/DL
POTASSIUM SERPL-SCNC: 4.2 MMOL/L (ref 3.5–5.1)
PROT SERPL-MCNC: 7.8 G/DL (ref 6–8.4)
RPR SER QL: REACTIVE
RPR SER-TITR: ABNORMAL {TITER}
SODIUM SERPL-SCNC: 138 MMOL/L (ref 136–145)
TRIGL SERPL-MCNC: 187 MG/DL (ref 30–150)

## 2021-03-01 ENCOUNTER — TELEPHONE (OUTPATIENT)
Dept: INFECTIOUS DISEASES | Facility: CLINIC | Age: 41
End: 2021-03-01

## 2021-03-01 ENCOUNTER — PATIENT MESSAGE (OUTPATIENT)
Dept: INFECTIOUS DISEASES | Facility: CLINIC | Age: 41
End: 2021-03-01

## 2021-03-01 DIAGNOSIS — A53.9 SYPHILIS: Primary | ICD-10-CM

## 2021-03-01 LAB — HEPATITIS A ANTIBODY, IGG: POSITIVE

## 2021-03-02 ENCOUNTER — OFFICE VISIT (OUTPATIENT)
Dept: OTOLARYNGOLOGY | Facility: CLINIC | Age: 41
End: 2021-03-02
Payer: COMMERCIAL

## 2021-03-02 ENCOUNTER — CLINICAL SUPPORT (OUTPATIENT)
Dept: INFECTIOUS DISEASES | Facility: CLINIC | Age: 41
End: 2021-03-02
Payer: COMMERCIAL

## 2021-03-02 ENCOUNTER — CLINICAL SUPPORT (OUTPATIENT)
Dept: AUDIOLOGY | Facility: CLINIC | Age: 41
End: 2021-03-02
Payer: COMMERCIAL

## 2021-03-02 DIAGNOSIS — H65.23 BILATERAL CHRONIC SEROUS OTITIS MEDIA: Primary | ICD-10-CM

## 2021-03-02 DIAGNOSIS — H90.6 MIXED HEARING LOSS, BILATERAL: Primary | ICD-10-CM

## 2021-03-02 DIAGNOSIS — H93.8X3 SENSATION OF FULLNESS IN BOTH EARS: ICD-10-CM

## 2021-03-02 DIAGNOSIS — H90.0 CONDUCTIVE HEARING LOSS, BILATERAL: ICD-10-CM

## 2021-03-02 LAB
ALBUMIN SERPL-MCNC: 4.4 G/DL (ref 3.6–5.1)
SHBG SERPL-SCNC: 11 NMOL/L (ref 10–50)
TESTOST FREE SERPL-MCNC: 56.6 PG/ML (ref 46–224)
TESTOST SERPL-MCNC: 218 NG/DL (ref 250–1100)
TESTOSTERONE.FREE+WB SERPL-MCNC: 113.9 NG/DL (ref 110–575)

## 2021-03-02 PROCEDURE — 99214 PR OFFICE/OUTPT VISIT, EST, LEVL IV, 30-39 MIN: ICD-10-PCS | Mod: S$GLB,,, | Performed by: OTOLARYNGOLOGY

## 2021-03-02 PROCEDURE — 1126F PR PAIN SEVERITY QUANTIFIED, NO PAIN PRESENT: ICD-10-PCS | Mod: S$GLB,,, | Performed by: OTOLARYNGOLOGY

## 2021-03-02 PROCEDURE — 92567 PR TYMPA2METRY: ICD-10-PCS | Mod: S$GLB,,, | Performed by: AUDIOLOGIST

## 2021-03-02 PROCEDURE — 92557 COMPREHENSIVE HEARING TEST: CPT | Mod: S$GLB,,, | Performed by: AUDIOLOGIST

## 2021-03-02 PROCEDURE — 96372 THER/PROPH/DIAG INJ SC/IM: CPT | Mod: S$GLB,,, | Performed by: INTERNAL MEDICINE

## 2021-03-02 PROCEDURE — 99999 PR PBB SHADOW E&M-EST. PATIENT-LVL II: CPT | Mod: PBBFAC,,,

## 2021-03-02 PROCEDURE — 92557 PR COMPREHENSIVE HEARING TEST: ICD-10-PCS | Mod: S$GLB,,, | Performed by: AUDIOLOGIST

## 2021-03-02 PROCEDURE — 99999 PR PBB SHADOW E&M-EST. PATIENT-LVL III: ICD-10-PCS | Mod: PBBFAC,,, | Performed by: OTOLARYNGOLOGY

## 2021-03-02 PROCEDURE — 99999 PR PBB SHADOW E&M-EST. PATIENT-LVL II: ICD-10-PCS | Mod: PBBFAC,,,

## 2021-03-02 PROCEDURE — 96372 PR INJECTION,THERAP/PROPH/DIAG2ST, IM OR SUBCUT: ICD-10-PCS | Mod: S$GLB,,, | Performed by: INTERNAL MEDICINE

## 2021-03-02 PROCEDURE — 99999 PR PBB SHADOW E&M-EST. PATIENT-LVL III: CPT | Mod: PBBFAC,,, | Performed by: OTOLARYNGOLOGY

## 2021-03-02 PROCEDURE — 1126F AMNT PAIN NOTED NONE PRSNT: CPT | Mod: S$GLB,,, | Performed by: OTOLARYNGOLOGY

## 2021-03-02 PROCEDURE — 99214 OFFICE O/P EST MOD 30 MIN: CPT | Mod: S$GLB,,, | Performed by: OTOLARYNGOLOGY

## 2021-03-02 PROCEDURE — 92567 TYMPANOMETRY: CPT | Mod: S$GLB,,, | Performed by: AUDIOLOGIST

## 2021-03-02 RX ORDER — AMOXICILLIN AND CLAVULANATE POTASSIUM 875; 125 MG/1; MG/1
1 TABLET, FILM COATED ORAL EVERY 12 HOURS
Qty: 20 TABLET | Refills: 0 | Status: SHIPPED | OUTPATIENT
Start: 2021-03-02 | End: 2021-03-11

## 2021-03-02 RX ORDER — PREDNISONE 5 MG/1
TABLET ORAL
Qty: 25 TABLET | Refills: 0 | Status: SHIPPED | OUTPATIENT
Start: 2021-03-02 | End: 2021-03-11

## 2021-03-03 LAB — T PALLIDUM AB SER QL IF: REACTIVE

## 2021-03-04 ENCOUNTER — TELEPHONE (OUTPATIENT)
Dept: INFECTIOUS DISEASES | Facility: CLINIC | Age: 41
End: 2021-03-04

## 2021-03-04 ENCOUNTER — PATIENT MESSAGE (OUTPATIENT)
Dept: INFECTIOUS DISEASES | Facility: CLINIC | Age: 41
End: 2021-03-04

## 2021-03-04 DIAGNOSIS — B20 HUMAN IMMUNODEFICIENCY VIRUS (HIV) DISEASE: Primary | ICD-10-CM

## 2021-03-04 LAB
HIV 1+2 AB+HIV1 P24 AG SERPL QL IA: POSITIVE
HIV SUPPLEMENTAL ASSAY INTERPRETATION: ABNORMAL
HIV-1 RESULT: POSITIVE
HIV-2 RESULT: NEGATIVE

## 2021-03-06 ENCOUNTER — LAB VISIT (OUTPATIENT)
Dept: LAB | Facility: HOSPITAL | Age: 41
End: 2021-03-06
Attending: INTERNAL MEDICINE
Payer: COMMERCIAL

## 2021-03-06 DIAGNOSIS — B20 HUMAN IMMUNODEFICIENCY VIRUS (HIV) DISEASE: ICD-10-CM

## 2021-03-06 PROCEDURE — 81381 HLA I TYPING 1 ALLELE HR: CPT | Mod: PO | Performed by: INTERNAL MEDICINE

## 2021-03-09 ENCOUNTER — SPECIALTY PHARMACY (OUTPATIENT)
Dept: PHARMACY | Facility: CLINIC | Age: 41
End: 2021-03-09

## 2021-03-09 ENCOUNTER — CLINICAL SUPPORT (OUTPATIENT)
Dept: INFECTIOUS DISEASES | Facility: CLINIC | Age: 41
End: 2021-03-09
Payer: COMMERCIAL

## 2021-03-09 ENCOUNTER — OFFICE VISIT (OUTPATIENT)
Dept: INFECTIOUS DISEASES | Facility: CLINIC | Age: 41
End: 2021-03-09
Payer: COMMERCIAL

## 2021-03-09 VITALS
TEMPERATURE: 98 F | HEIGHT: 71 IN | DIASTOLIC BLOOD PRESSURE: 85 MMHG | SYSTOLIC BLOOD PRESSURE: 148 MMHG | WEIGHT: 276.69 LBS | BODY MASS INDEX: 38.73 KG/M2 | HEART RATE: 86 BPM

## 2021-03-09 DIAGNOSIS — E29.1 HYPOGONADISM IN MALE: ICD-10-CM

## 2021-03-09 DIAGNOSIS — E55.9 HYPOVITAMINOSIS D: ICD-10-CM

## 2021-03-09 DIAGNOSIS — I10 ESSENTIAL HYPERTENSION: ICD-10-CM

## 2021-03-09 DIAGNOSIS — B20 HUMAN IMMUNODEFICIENCY VIRUS (HIV) DISEASE: Primary | ICD-10-CM

## 2021-03-09 PROCEDURE — 96372 THER/PROPH/DIAG INJ SC/IM: CPT | Mod: S$GLB,,, | Performed by: INTERNAL MEDICINE

## 2021-03-09 PROCEDURE — 3008F BODY MASS INDEX DOCD: CPT | Mod: CPTII,S$GLB,, | Performed by: INTERNAL MEDICINE

## 2021-03-09 PROCEDURE — 99215 PR OFFICE/OUTPT VISIT, EST, LEVL V, 40-54 MIN: ICD-10-PCS | Mod: 25,S$GLB,, | Performed by: INTERNAL MEDICINE

## 2021-03-09 PROCEDURE — 96372 PR INJECTION,THERAP/PROPH/DIAG2ST, IM OR SUBCUT: ICD-10-PCS | Mod: S$GLB,,, | Performed by: INTERNAL MEDICINE

## 2021-03-09 PROCEDURE — 3077F SYST BP >= 140 MM HG: CPT | Mod: CPTII,S$GLB,, | Performed by: INTERNAL MEDICINE

## 2021-03-09 PROCEDURE — 1126F PR PAIN SEVERITY QUANTIFIED, NO PAIN PRESENT: ICD-10-PCS | Mod: S$GLB,,, | Performed by: INTERNAL MEDICINE

## 2021-03-09 PROCEDURE — 99215 OFFICE O/P EST HI 40 MIN: CPT | Mod: 25,S$GLB,, | Performed by: INTERNAL MEDICINE

## 2021-03-09 PROCEDURE — 99999 PR PBB SHADOW E&M-EST. PATIENT-LVL IV: CPT | Mod: PBBFAC,,, | Performed by: INTERNAL MEDICINE

## 2021-03-09 PROCEDURE — 3008F PR BODY MASS INDEX (BMI) DOCUMENTED: ICD-10-PCS | Mod: CPTII,S$GLB,, | Performed by: INTERNAL MEDICINE

## 2021-03-09 PROCEDURE — 3079F DIAST BP 80-89 MM HG: CPT | Mod: CPTII,S$GLB,, | Performed by: INTERNAL MEDICINE

## 2021-03-09 PROCEDURE — 3079F PR MOST RECENT DIASTOLIC BLOOD PRESSURE 80-89 MM HG: ICD-10-PCS | Mod: CPTII,S$GLB,, | Performed by: INTERNAL MEDICINE

## 2021-03-09 PROCEDURE — 99999 PR PBB SHADOW E&M-EST. PATIENT-LVL IV: ICD-10-PCS | Mod: PBBFAC,,, | Performed by: INTERNAL MEDICINE

## 2021-03-09 PROCEDURE — 1126F AMNT PAIN NOTED NONE PRSNT: CPT | Mod: S$GLB,,, | Performed by: INTERNAL MEDICINE

## 2021-03-09 PROCEDURE — 3077F PR MOST RECENT SYSTOLIC BLOOD PRESSURE >= 140 MM HG: ICD-10-PCS | Mod: CPTII,S$GLB,, | Performed by: INTERNAL MEDICINE

## 2021-03-09 RX ORDER — ERGOCALCIFEROL 1.25 MG/1
50000 CAPSULE ORAL
Qty: 12 CAPSULE | Refills: 0 | Status: SHIPPED | OUTPATIENT
Start: 2021-03-09 | End: 2021-05-12 | Stop reason: SDUPTHER

## 2021-03-11 ENCOUNTER — SPECIALTY PHARMACY (OUTPATIENT)
Dept: PHARMACY | Facility: CLINIC | Age: 41
End: 2021-03-11

## 2021-03-12 ENCOUNTER — OFFICE VISIT (OUTPATIENT)
Dept: ENDOCRINOLOGY | Facility: CLINIC | Age: 41
End: 2021-03-12
Attending: INTERNAL MEDICINE
Payer: COMMERCIAL

## 2021-03-12 VITALS
HEIGHT: 71 IN | HEART RATE: 86 BPM | BODY MASS INDEX: 37.9 KG/M2 | DIASTOLIC BLOOD PRESSURE: 82 MMHG | WEIGHT: 270.75 LBS | SYSTOLIC BLOOD PRESSURE: 142 MMHG | OXYGEN SATURATION: 97 %

## 2021-03-12 DIAGNOSIS — I10 ESSENTIAL HYPERTENSION: ICD-10-CM

## 2021-03-12 DIAGNOSIS — E29.1 HYPOGONADISM IN MALE: ICD-10-CM

## 2021-03-12 DIAGNOSIS — E66.01 CLASS 2 SEVERE OBESITY DUE TO EXCESS CALORIES WITH SERIOUS COMORBIDITY AND BODY MASS INDEX (BMI) OF 37.0 TO 37.9 IN ADULT: ICD-10-CM

## 2021-03-12 PROBLEM — E66.812 CLASS 2 SEVERE OBESITY DUE TO EXCESS CALORIES WITH SERIOUS COMORBIDITY AND BODY MASS INDEX (BMI) OF 37.0 TO 37.9 IN ADULT: Status: ACTIVE | Noted: 2021-03-12

## 2021-03-12 PROCEDURE — 3077F SYST BP >= 140 MM HG: CPT | Mod: CPTII,S$GLB,, | Performed by: INTERNAL MEDICINE

## 2021-03-12 PROCEDURE — 3079F DIAST BP 80-89 MM HG: CPT | Mod: CPTII,S$GLB,, | Performed by: INTERNAL MEDICINE

## 2021-03-12 PROCEDURE — 3077F PR MOST RECENT SYSTOLIC BLOOD PRESSURE >= 140 MM HG: ICD-10-PCS | Mod: CPTII,S$GLB,, | Performed by: INTERNAL MEDICINE

## 2021-03-12 PROCEDURE — 1126F PR PAIN SEVERITY QUANTIFIED, NO PAIN PRESENT: ICD-10-PCS | Mod: S$GLB,,, | Performed by: INTERNAL MEDICINE

## 2021-03-12 PROCEDURE — 99204 PR OFFICE/OUTPT VISIT, NEW, LEVL IV, 45-59 MIN: ICD-10-PCS | Mod: S$GLB,,, | Performed by: INTERNAL MEDICINE

## 2021-03-12 PROCEDURE — 3079F PR MOST RECENT DIASTOLIC BLOOD PRESSURE 80-89 MM HG: ICD-10-PCS | Mod: CPTII,S$GLB,, | Performed by: INTERNAL MEDICINE

## 2021-03-12 PROCEDURE — 3008F PR BODY MASS INDEX (BMI) DOCUMENTED: ICD-10-PCS | Mod: CPTII,S$GLB,, | Performed by: INTERNAL MEDICINE

## 2021-03-12 PROCEDURE — 3008F BODY MASS INDEX DOCD: CPT | Mod: CPTII,S$GLB,, | Performed by: INTERNAL MEDICINE

## 2021-03-12 PROCEDURE — 99204 OFFICE O/P NEW MOD 45 MIN: CPT | Mod: S$GLB,,, | Performed by: INTERNAL MEDICINE

## 2021-03-12 PROCEDURE — 1126F AMNT PAIN NOTED NONE PRSNT: CPT | Mod: S$GLB,,, | Performed by: INTERNAL MEDICINE

## 2021-03-16 ENCOUNTER — CLINICAL SUPPORT (OUTPATIENT)
Dept: INFECTIOUS DISEASES | Facility: CLINIC | Age: 41
End: 2021-03-16
Payer: COMMERCIAL

## 2021-03-16 LAB
B5701 INTERPRETATION: NORMAL
HLA-B27 RELATED AG QL: NEGATIVE
HLA-B27 RELATED AG QL: NORMAL

## 2021-03-16 PROCEDURE — 99999 PR PBB SHADOW E&M-EST. PATIENT-LVL I: CPT | Mod: PBBFAC,,,

## 2021-03-16 PROCEDURE — 99999 PR PBB SHADOW E&M-EST. PATIENT-LVL I: ICD-10-PCS | Mod: PBBFAC,,,

## 2021-03-16 PROCEDURE — 96372 THER/PROPH/DIAG INJ SC/IM: CPT | Mod: S$GLB,,, | Performed by: INTERNAL MEDICINE

## 2021-03-16 PROCEDURE — 96372 PR INJECTION,THERAP/PROPH/DIAG2ST, IM OR SUBCUT: ICD-10-PCS | Mod: S$GLB,,, | Performed by: INTERNAL MEDICINE

## 2021-03-26 ENCOUNTER — LAB VISIT (OUTPATIENT)
Dept: LAB | Facility: OTHER | Age: 41
End: 2021-03-26
Attending: INTERNAL MEDICINE
Payer: COMMERCIAL

## 2021-03-26 DIAGNOSIS — E29.1 HYPOGONADISM IN MALE: ICD-10-CM

## 2021-03-26 LAB
COMPLEXED PSA SERPL-MCNC: 0.67 NG/ML (ref 0–4)
FSH SERPL-ACNC: 2.3 MIU/ML (ref 0.95–11.95)
LH SERPL-ACNC: 2.9 MIU/ML (ref 0.6–12.1)
PROLACTIN SERPL IA-MCNC: 15.5 NG/ML (ref 3.5–19.4)
TESTOST SERPL-MCNC: 269 NG/DL (ref 304–1227)
TSH SERPL DL<=0.005 MIU/L-ACNC: 1.75 UIU/ML (ref 0.4–4)

## 2021-03-26 PROCEDURE — 84403 ASSAY OF TOTAL TESTOSTERONE: CPT | Performed by: INTERNAL MEDICINE

## 2021-03-26 PROCEDURE — 84443 ASSAY THYROID STIM HORMONE: CPT | Performed by: INTERNAL MEDICINE

## 2021-03-26 PROCEDURE — 84146 ASSAY OF PROLACTIN: CPT | Performed by: INTERNAL MEDICINE

## 2021-03-26 PROCEDURE — 83002 ASSAY OF GONADOTROPIN (LH): CPT | Performed by: INTERNAL MEDICINE

## 2021-03-26 PROCEDURE — 84153 ASSAY OF PSA TOTAL: CPT | Performed by: INTERNAL MEDICINE

## 2021-03-26 PROCEDURE — 36415 COLL VENOUS BLD VENIPUNCTURE: CPT | Performed by: INTERNAL MEDICINE

## 2021-03-26 PROCEDURE — 83001 ASSAY OF GONADOTROPIN (FSH): CPT | Performed by: INTERNAL MEDICINE

## 2021-03-29 ENCOUNTER — PATIENT MESSAGE (OUTPATIENT)
Dept: ENDOCRINOLOGY | Facility: CLINIC | Age: 41
End: 2021-03-29

## 2021-03-29 DIAGNOSIS — E29.1 HYPOGONADISM IN MALE: Primary | ICD-10-CM

## 2021-03-29 RX ORDER — TESTOSTERONE 20.25 MG/1.25G
1 GEL TOPICAL DAILY
Qty: 1 BOTTLE | Refills: 5 | Status: SHIPPED | OUTPATIENT
Start: 2021-03-29 | End: 2021-04-22 | Stop reason: SDUPTHER

## 2021-04-01 ENCOUNTER — IMMUNIZATION (OUTPATIENT)
Dept: INTERNAL MEDICINE | Facility: CLINIC | Age: 41
End: 2021-04-01
Payer: COMMERCIAL

## 2021-04-01 DIAGNOSIS — Z23 NEED FOR VACCINATION: Primary | ICD-10-CM

## 2021-04-01 PROCEDURE — 0011A COVID-19, MRNA, LNP-S, PF, 100 MCG/0.5 ML DOSE VACCINE: CPT | Mod: PBBFAC | Performed by: INTERNAL MEDICINE

## 2021-04-05 ENCOUNTER — SPECIALTY PHARMACY (OUTPATIENT)
Dept: PHARMACY | Facility: CLINIC | Age: 41
End: 2021-04-05

## 2021-04-14 ENCOUNTER — PATIENT MESSAGE (OUTPATIENT)
Dept: INFECTIOUS DISEASES | Facility: CLINIC | Age: 41
End: 2021-04-14

## 2021-04-15 ENCOUNTER — PATIENT MESSAGE (OUTPATIENT)
Dept: ENDOCRINOLOGY | Facility: CLINIC | Age: 41
End: 2021-04-15

## 2021-04-20 ENCOUNTER — LAB VISIT (OUTPATIENT)
Dept: LAB | Facility: HOSPITAL | Age: 41
End: 2021-04-20
Attending: INTERNAL MEDICINE
Payer: COMMERCIAL

## 2021-04-20 DIAGNOSIS — B20 HUMAN IMMUNODEFICIENCY VIRUS (HIV) DISEASE: ICD-10-CM

## 2021-04-20 LAB
ALBUMIN SERPL BCP-MCNC: 4.1 G/DL (ref 3.5–5.2)
ALP SERPL-CCNC: 76 U/L (ref 55–135)
ALT SERPL W/O P-5'-P-CCNC: 28 U/L (ref 10–44)
ANION GAP SERPL CALC-SCNC: 6 MMOL/L (ref 8–16)
AST SERPL-CCNC: 23 U/L (ref 10–40)
BASOPHILS # BLD AUTO: 0.08 K/UL (ref 0–0.2)
BASOPHILS NFR BLD: 1.3 % (ref 0–1.9)
BILIRUB SERPL-MCNC: 0.8 MG/DL (ref 0.1–1)
BUN SERPL-MCNC: 18 MG/DL (ref 6–20)
CALCIUM SERPL-MCNC: 9.1 MG/DL (ref 8.7–10.5)
CHLORIDE SERPL-SCNC: 103 MMOL/L (ref 95–110)
CO2 SERPL-SCNC: 30 MMOL/L (ref 23–29)
CREAT SERPL-MCNC: 1.4 MG/DL (ref 0.5–1.4)
DIFFERENTIAL METHOD: NORMAL
EOSINOPHIL # BLD AUTO: 0.3 K/UL (ref 0–0.5)
EOSINOPHIL NFR BLD: 4.1 % (ref 0–8)
ERYTHROCYTE [DISTWIDTH] IN BLOOD BY AUTOMATED COUNT: 13.8 % (ref 11.5–14.5)
EST. GFR  (AFRICAN AMERICAN): >60 ML/MIN/1.73 M^2
EST. GFR  (NON AFRICAN AMERICAN): >60 ML/MIN/1.73 M^2
GLUCOSE SERPL-MCNC: 114 MG/DL (ref 70–110)
HCT VFR BLD AUTO: 47.2 % (ref 40–54)
HGB BLD-MCNC: 15.9 G/DL (ref 14–18)
IMM GRANULOCYTES # BLD AUTO: 0.01 K/UL (ref 0–0.04)
IMM GRANULOCYTES NFR BLD AUTO: 0.2 % (ref 0–0.5)
LYMPHOCYTES # BLD AUTO: 2 K/UL (ref 1–4.8)
LYMPHOCYTES NFR BLD: 31.3 % (ref 18–48)
MCH RBC QN AUTO: 30 PG (ref 27–31)
MCHC RBC AUTO-ENTMCNC: 33.7 G/DL (ref 32–36)
MCV RBC AUTO: 89 FL (ref 82–98)
MONOCYTES # BLD AUTO: 0.6 K/UL (ref 0.3–1)
MONOCYTES NFR BLD: 8.7 % (ref 4–15)
NEUTROPHILS # BLD AUTO: 3.4 K/UL (ref 1.8–7.7)
NEUTROPHILS NFR BLD: 54.4 % (ref 38–73)
NRBC BLD-RTO: 0 /100 WBC
PLATELET # BLD AUTO: 286 K/UL (ref 150–450)
PMV BLD AUTO: 9.4 FL (ref 9.2–12.9)
POTASSIUM SERPL-SCNC: 4.7 MMOL/L (ref 3.5–5.1)
PROT SERPL-MCNC: 7.9 G/DL (ref 6–8.4)
RBC # BLD AUTO: 5.3 M/UL (ref 4.6–6.2)
SODIUM SERPL-SCNC: 139 MMOL/L (ref 136–145)
WBC # BLD AUTO: 6.3 K/UL (ref 3.9–12.7)

## 2021-04-20 PROCEDURE — 80053 COMPREHEN METABOLIC PANEL: CPT | Performed by: INTERNAL MEDICINE

## 2021-04-20 PROCEDURE — 36415 COLL VENOUS BLD VENIPUNCTURE: CPT | Performed by: INTERNAL MEDICINE

## 2021-04-20 PROCEDURE — 87536 HIV-1 QUANT&REVRSE TRNSCRPJ: CPT | Performed by: INTERNAL MEDICINE

## 2021-04-20 PROCEDURE — 86361 T CELL ABSOLUTE COUNT: CPT | Performed by: INTERNAL MEDICINE

## 2021-04-20 PROCEDURE — 85025 COMPLETE CBC W/AUTO DIFF WBC: CPT | Performed by: INTERNAL MEDICINE

## 2021-04-22 ENCOUNTER — TELEPHONE (OUTPATIENT)
Dept: ENDOCRINOLOGY | Facility: CLINIC | Age: 41
End: 2021-04-22

## 2021-04-22 DIAGNOSIS — E29.1 HYPOGONADISM IN MALE: ICD-10-CM

## 2021-04-22 LAB
CD3+CD4+ CELLS # BLD: 492 CELLS/UL (ref 300–1400)
CD3+CD4+ CELLS NFR BLD: 23.7 % (ref 28–57)
HIV1 RNA # PLAS NAA DL=20: 23 COPIES/ML

## 2021-04-22 RX ORDER — TESTOSTERONE 20.25 MG/1.25G
1 GEL TOPICAL DAILY
Qty: 75 G | Refills: 5 | Status: SHIPPED | OUTPATIENT
Start: 2021-04-22 | End: 2021-10-27

## 2021-04-28 ENCOUNTER — TELEPHONE (OUTPATIENT)
Dept: PHARMACY | Facility: CLINIC | Age: 41
End: 2021-04-28

## 2021-04-29 ENCOUNTER — IMMUNIZATION (OUTPATIENT)
Dept: INTERNAL MEDICINE | Facility: CLINIC | Age: 41
End: 2021-04-29
Payer: COMMERCIAL

## 2021-04-29 DIAGNOSIS — Z23 NEED FOR VACCINATION: Primary | ICD-10-CM

## 2021-04-29 PROCEDURE — 0012A COVID-19, MRNA, LNP-S, PF, 100 MCG/0.5 ML DOSE VACCINE: CPT | Mod: PBBFAC | Performed by: INTERNAL MEDICINE

## 2021-05-04 ENCOUNTER — PATIENT MESSAGE (OUTPATIENT)
Dept: PHARMACY | Facility: CLINIC | Age: 41
End: 2021-05-04

## 2021-05-11 ENCOUNTER — SPECIALTY PHARMACY (OUTPATIENT)
Dept: PHARMACY | Facility: CLINIC | Age: 41
End: 2021-05-11

## 2021-05-12 ENCOUNTER — OFFICE VISIT (OUTPATIENT)
Dept: INFECTIOUS DISEASES | Facility: CLINIC | Age: 41
End: 2021-05-12
Payer: COMMERCIAL

## 2021-05-12 VITALS
TEMPERATURE: 98 F | HEIGHT: 71 IN | SYSTOLIC BLOOD PRESSURE: 155 MMHG | DIASTOLIC BLOOD PRESSURE: 83 MMHG | WEIGHT: 276.88 LBS | BODY MASS INDEX: 38.76 KG/M2 | HEART RATE: 95 BPM

## 2021-05-12 DIAGNOSIS — E55.9 HYPOVITAMINOSIS D: ICD-10-CM

## 2021-05-12 DIAGNOSIS — I10 ESSENTIAL HYPERTENSION: ICD-10-CM

## 2021-05-12 DIAGNOSIS — B20 HUMAN IMMUNODEFICIENCY VIRUS (HIV) DISEASE: Primary | ICD-10-CM

## 2021-05-12 DIAGNOSIS — A53.9 SYPHILIS: ICD-10-CM

## 2021-05-12 PROCEDURE — 99999 PR PBB SHADOW E&M-EST. PATIENT-LVL III: CPT | Mod: PBBFAC,,, | Performed by: INTERNAL MEDICINE

## 2021-05-12 PROCEDURE — 99214 PR OFFICE/OUTPT VISIT, EST, LEVL IV, 30-39 MIN: ICD-10-PCS | Mod: S$GLB,,, | Performed by: INTERNAL MEDICINE

## 2021-05-12 PROCEDURE — 99999 PR PBB SHADOW E&M-EST. PATIENT-LVL III: ICD-10-PCS | Mod: PBBFAC,,, | Performed by: INTERNAL MEDICINE

## 2021-05-12 PROCEDURE — 3008F PR BODY MASS INDEX (BMI) DOCUMENTED: ICD-10-PCS | Mod: CPTII,S$GLB,, | Performed by: INTERNAL MEDICINE

## 2021-05-12 PROCEDURE — 1126F PR PAIN SEVERITY QUANTIFIED, NO PAIN PRESENT: ICD-10-PCS | Mod: S$GLB,,, | Performed by: INTERNAL MEDICINE

## 2021-05-12 PROCEDURE — 99214 OFFICE O/P EST MOD 30 MIN: CPT | Mod: S$GLB,,, | Performed by: INTERNAL MEDICINE

## 2021-05-12 PROCEDURE — 1126F AMNT PAIN NOTED NONE PRSNT: CPT | Mod: S$GLB,,, | Performed by: INTERNAL MEDICINE

## 2021-05-12 PROCEDURE — 3008F BODY MASS INDEX DOCD: CPT | Mod: CPTII,S$GLB,, | Performed by: INTERNAL MEDICINE

## 2021-05-12 RX ORDER — LISINOPRIL 10 MG/1
10 TABLET ORAL DAILY
Qty: 30 TABLET | Refills: 11 | Status: SHIPPED | OUTPATIENT
Start: 2021-05-12 | End: 2021-12-02

## 2021-05-12 RX ORDER — ERGOCALCIFEROL 1.25 MG/1
50000 CAPSULE ORAL
Qty: 12 CAPSULE | Refills: 0 | Status: SHIPPED | OUTPATIENT
Start: 2021-05-12 | End: 2021-09-06

## 2021-05-26 ENCOUNTER — OFFICE VISIT (OUTPATIENT)
Dept: SLEEP MEDICINE | Facility: CLINIC | Age: 41
End: 2021-05-26
Payer: COMMERCIAL

## 2021-05-26 VITALS
HEIGHT: 71 IN | DIASTOLIC BLOOD PRESSURE: 80 MMHG | WEIGHT: 271.38 LBS | BODY MASS INDEX: 37.99 KG/M2 | HEART RATE: 85 BPM | SYSTOLIC BLOOD PRESSURE: 139 MMHG

## 2021-05-26 DIAGNOSIS — G47.33 OBSTRUCTIVE SLEEP APNEA: Primary | ICD-10-CM

## 2021-05-26 DIAGNOSIS — I10 ESSENTIAL HYPERTENSION: ICD-10-CM

## 2021-05-26 PROCEDURE — 99214 PR OFFICE/OUTPT VISIT, EST, LEVL IV, 30-39 MIN: ICD-10-PCS | Mod: S$GLB,,, | Performed by: NURSE PRACTITIONER

## 2021-05-26 PROCEDURE — 3008F BODY MASS INDEX DOCD: CPT | Mod: CPTII,S$GLB,, | Performed by: NURSE PRACTITIONER

## 2021-05-26 PROCEDURE — 99999 PR PBB SHADOW E&M-EST. PATIENT-LVL III: ICD-10-PCS | Mod: PBBFAC,,, | Performed by: NURSE PRACTITIONER

## 2021-05-26 PROCEDURE — 3008F PR BODY MASS INDEX (BMI) DOCUMENTED: ICD-10-PCS | Mod: CPTII,S$GLB,, | Performed by: NURSE PRACTITIONER

## 2021-05-26 PROCEDURE — 1126F PR PAIN SEVERITY QUANTIFIED, NO PAIN PRESENT: ICD-10-PCS | Mod: S$GLB,,, | Performed by: NURSE PRACTITIONER

## 2021-05-26 PROCEDURE — 99214 OFFICE O/P EST MOD 30 MIN: CPT | Mod: S$GLB,,, | Performed by: NURSE PRACTITIONER

## 2021-05-26 PROCEDURE — 1126F AMNT PAIN NOTED NONE PRSNT: CPT | Mod: S$GLB,,, | Performed by: NURSE PRACTITIONER

## 2021-05-26 PROCEDURE — 99999 PR PBB SHADOW E&M-EST. PATIENT-LVL III: CPT | Mod: PBBFAC,,, | Performed by: NURSE PRACTITIONER

## 2021-05-31 ENCOUNTER — TELEPHONE (OUTPATIENT)
Dept: SLEEP MEDICINE | Facility: OTHER | Age: 41
End: 2021-05-31

## 2021-06-08 ENCOUNTER — SPECIALTY PHARMACY (OUTPATIENT)
Dept: PHARMACY | Facility: CLINIC | Age: 41
End: 2021-06-08

## 2021-06-24 ENCOUNTER — TELEPHONE (OUTPATIENT)
Dept: SLEEP MEDICINE | Facility: OTHER | Age: 41
End: 2021-06-24

## 2021-06-25 ENCOUNTER — HOSPITAL ENCOUNTER (OUTPATIENT)
Dept: SLEEP MEDICINE | Facility: OTHER | Age: 41
Discharge: HOME | End: 2021-06-25
Attending: NURSE PRACTITIONER
Payer: COMMERCIAL

## 2021-06-25 DIAGNOSIS — G47.33 OBSTRUCTIVE SLEEP APNEA: ICD-10-CM

## 2021-06-25 PROCEDURE — 95800 SLP STDY UNATTENDED: CPT | Mod: 26,,, | Performed by: PSYCHIATRY & NEUROLOGY

## 2021-06-25 PROCEDURE — 95800 PR SLEEP STUDY, UNATTENDED, RECORD HEART RATE/O2 SAT/RESP ANAL/SLEEP TIME: ICD-10-PCS | Mod: 26,,, | Performed by: PSYCHIATRY & NEUROLOGY

## 2021-06-25 PROCEDURE — 95800 SLP STDY UNATTENDED: CPT

## 2021-07-01 ENCOUNTER — CLINICAL SUPPORT (OUTPATIENT)
Dept: URGENT CARE | Facility: CLINIC | Age: 41
End: 2021-07-01
Payer: COMMERCIAL

## 2021-07-01 ENCOUNTER — PATIENT MESSAGE (OUTPATIENT)
Dept: PHARMACY | Facility: CLINIC | Age: 41
End: 2021-07-01

## 2021-07-01 DIAGNOSIS — Z11.9 SCREENING EXAMINATION FOR UNSPECIFIED INFECTIOUS DISEASE: Primary | ICD-10-CM

## 2021-07-01 LAB
CTP QC/QA: YES
SARS-COV-2 RDRP RESP QL NAA+PROBE: NEGATIVE

## 2021-07-01 PROCEDURE — 99211 OFF/OP EST MAY X REQ PHY/QHP: CPT | Mod: S$GLB,CS,, | Performed by: PHYSICIAN ASSISTANT

## 2021-07-01 PROCEDURE — U0002 COVID-19 LAB TEST NON-CDC: HCPCS | Mod: QW,S$GLB,, | Performed by: PHYSICIAN ASSISTANT

## 2021-07-01 PROCEDURE — 99211 PR OFFICE/OUTPT VISIT, EST, LEVL I: ICD-10-PCS | Mod: S$GLB,CS,, | Performed by: PHYSICIAN ASSISTANT

## 2021-07-01 PROCEDURE — U0002: ICD-10-PCS | Mod: QW,S$GLB,, | Performed by: PHYSICIAN ASSISTANT

## 2021-07-07 ENCOUNTER — PATIENT MESSAGE (OUTPATIENT)
Dept: SLEEP MEDICINE | Facility: CLINIC | Age: 41
End: 2021-07-07

## 2021-07-07 ENCOUNTER — PATIENT MESSAGE (OUTPATIENT)
Dept: PHARMACY | Facility: CLINIC | Age: 41
End: 2021-07-07

## 2021-07-07 DIAGNOSIS — G47.33 OBSTRUCTIVE SLEEP APNEA: Primary | ICD-10-CM

## 2021-07-15 ENCOUNTER — SPECIALTY PHARMACY (OUTPATIENT)
Dept: PHARMACY | Facility: CLINIC | Age: 41
End: 2021-07-15

## 2021-07-21 ENCOUNTER — PATIENT MESSAGE (OUTPATIENT)
Dept: SLEEP MEDICINE | Facility: CLINIC | Age: 41
End: 2021-07-21

## 2021-08-12 ENCOUNTER — SPECIALTY PHARMACY (OUTPATIENT)
Dept: PHARMACY | Facility: CLINIC | Age: 41
End: 2021-08-12

## 2021-09-15 DIAGNOSIS — B20 HUMAN IMMUNODEFICIENCY VIRUS (HIV) DISEASE: ICD-10-CM

## 2021-09-15 RX ORDER — BICTEGRAVIR SODIUM, EMTRICITABINE, AND TENOFOVIR ALAFENAMIDE FUMARATE 50; 200; 25 MG/1; MG/1; MG/1
1 TABLET ORAL DAILY
Qty: 30 TABLET | Refills: 5 | Status: SHIPPED | OUTPATIENT
Start: 2021-09-15 | End: 2022-04-07 | Stop reason: SDUPTHER

## 2021-09-22 ENCOUNTER — SPECIALTY PHARMACY (OUTPATIENT)
Dept: PHARMACY | Facility: CLINIC | Age: 41
End: 2021-09-22

## 2021-10-25 ENCOUNTER — SPECIALTY PHARMACY (OUTPATIENT)
Dept: PHARMACY | Facility: CLINIC | Age: 41
End: 2021-10-25
Payer: COMMERCIAL

## 2021-11-18 ENCOUNTER — SPECIALTY PHARMACY (OUTPATIENT)
Dept: PHARMACY | Facility: CLINIC | Age: 41
End: 2021-11-18
Payer: COMMERCIAL

## 2021-12-02 ENCOUNTER — LAB VISIT (OUTPATIENT)
Dept: LAB | Facility: HOSPITAL | Age: 41
End: 2021-12-02
Attending: INTERNAL MEDICINE
Payer: COMMERCIAL

## 2021-12-02 ENCOUNTER — OFFICE VISIT (OUTPATIENT)
Dept: INFECTIOUS DISEASES | Facility: CLINIC | Age: 41
End: 2021-12-02
Payer: COMMERCIAL

## 2021-12-02 VITALS
BODY MASS INDEX: 39.79 KG/M2 | HEIGHT: 71 IN | DIASTOLIC BLOOD PRESSURE: 79 MMHG | HEART RATE: 92 BPM | WEIGHT: 284.19 LBS | TEMPERATURE: 99 F | SYSTOLIC BLOOD PRESSURE: 142 MMHG

## 2021-12-02 DIAGNOSIS — E55.9 HYPOVITAMINOSIS D: ICD-10-CM

## 2021-12-02 DIAGNOSIS — E29.1 HYPOGONADISM IN MALE: ICD-10-CM

## 2021-12-02 DIAGNOSIS — Z00.00 HEALTH MAINTENANCE EXAMINATION: ICD-10-CM

## 2021-12-02 DIAGNOSIS — B20 HUMAN IMMUNODEFICIENCY VIRUS (HIV) DISEASE: Primary | ICD-10-CM

## 2021-12-02 DIAGNOSIS — A09 INFECTIOUS DIARRHEA: ICD-10-CM

## 2021-12-02 DIAGNOSIS — B20 HUMAN IMMUNODEFICIENCY VIRUS (HIV) DISEASE: ICD-10-CM

## 2021-12-02 DIAGNOSIS — I10 ESSENTIAL HYPERTENSION: ICD-10-CM

## 2021-12-02 LAB
ALBUMIN SERPL BCP-MCNC: 4.2 G/DL (ref 3.5–5.2)
ALP SERPL-CCNC: 94 U/L (ref 55–135)
ALT SERPL W/O P-5'-P-CCNC: 43 U/L (ref 10–44)
ANION GAP SERPL CALC-SCNC: 7 MMOL/L (ref 8–16)
AST SERPL-CCNC: 32 U/L (ref 10–40)
BASOPHILS # BLD AUTO: 0.08 K/UL (ref 0–0.2)
BASOPHILS NFR BLD: 1.3 % (ref 0–1.9)
BILIRUB SERPL-MCNC: 0.6 MG/DL (ref 0.1–1)
BILIRUB UR QL STRIP: NEGATIVE
BUN SERPL-MCNC: 11 MG/DL (ref 6–20)
CALCIUM SERPL-MCNC: 9.4 MG/DL (ref 8.7–10.5)
CHLORIDE SERPL-SCNC: 104 MMOL/L (ref 95–110)
CLARITY UR REFRACT.AUTO: CLEAR
CO2 SERPL-SCNC: 30 MMOL/L (ref 23–29)
COLOR UR AUTO: YELLOW
COMPLEXED PSA SERPL-MCNC: 0.62 NG/ML (ref 0–4)
CREAT SERPL-MCNC: 1.2 MG/DL (ref 0.5–1.4)
DIFFERENTIAL METHOD: ABNORMAL
EOSINOPHIL # BLD AUTO: 0.2 K/UL (ref 0–0.5)
EOSINOPHIL NFR BLD: 3.6 % (ref 0–8)
ERYTHROCYTE [DISTWIDTH] IN BLOOD BY AUTOMATED COUNT: 13.5 % (ref 11.5–14.5)
EST. GFR  (AFRICAN AMERICAN): >60 ML/MIN/1.73 M^2
EST. GFR  (NON AFRICAN AMERICAN): >60 ML/MIN/1.73 M^2
GLUCOSE SERPL-MCNC: 95 MG/DL (ref 70–110)
GLUCOSE UR QL STRIP: NEGATIVE
HCT VFR BLD AUTO: 46.8 % (ref 40–54)
HGB BLD-MCNC: 16 G/DL (ref 14–18)
HGB UR QL STRIP: NEGATIVE
IMM GRANULOCYTES # BLD AUTO: 0.05 K/UL (ref 0–0.04)
IMM GRANULOCYTES NFR BLD AUTO: 0.8 % (ref 0–0.5)
KETONES UR QL STRIP: NEGATIVE
LEUKOCYTE ESTERASE UR QL STRIP: NEGATIVE
LYMPHOCYTES # BLD AUTO: 2.1 K/UL (ref 1–4.8)
LYMPHOCYTES NFR BLD: 32.4 % (ref 18–48)
MCH RBC QN AUTO: 30 PG (ref 27–31)
MCHC RBC AUTO-ENTMCNC: 34.2 G/DL (ref 32–36)
MCV RBC AUTO: 88 FL (ref 82–98)
MONOCYTES # BLD AUTO: 0.5 K/UL (ref 0.3–1)
MONOCYTES NFR BLD: 7.2 % (ref 4–15)
NEUTROPHILS # BLD AUTO: 3.5 K/UL (ref 1.8–7.7)
NEUTROPHILS NFR BLD: 54.7 % (ref 38–73)
NITRITE UR QL STRIP: NEGATIVE
NRBC BLD-RTO: 0 /100 WBC
PH UR STRIP: 5 [PH] (ref 5–8)
PLATELET # BLD AUTO: 329 K/UL (ref 150–450)
PMV BLD AUTO: 9.5 FL (ref 9.2–12.9)
POTASSIUM SERPL-SCNC: 3.8 MMOL/L (ref 3.5–5.1)
PROT SERPL-MCNC: 7.7 G/DL (ref 6–8.4)
PROT UR QL STRIP: NEGATIVE
RBC # BLD AUTO: 5.34 M/UL (ref 4.6–6.2)
SODIUM SERPL-SCNC: 141 MMOL/L (ref 136–145)
SP GR UR STRIP: 1.02 (ref 1–1.03)
URN SPEC COLLECT METH UR: NORMAL
WBC # BLD AUTO: 6.36 K/UL (ref 3.9–12.7)

## 2021-12-02 PROCEDURE — 84153 ASSAY OF PSA TOTAL: CPT | Performed by: INTERNAL MEDICINE

## 2021-12-02 PROCEDURE — 4010F ACE/ARB THERAPY RXD/TAKEN: CPT | Mod: CPTII,S$GLB,, | Performed by: INTERNAL MEDICINE

## 2021-12-02 PROCEDURE — 90471 IMMUNIZATION ADMIN: CPT | Mod: S$GLB,,, | Performed by: INTERNAL MEDICINE

## 2021-12-02 PROCEDURE — 87591 N.GONORRHOEAE DNA AMP PROB: CPT | Performed by: INTERNAL MEDICINE

## 2021-12-02 PROCEDURE — 85025 COMPLETE CBC W/AUTO DIFF WBC: CPT | Performed by: INTERNAL MEDICINE

## 2021-12-02 PROCEDURE — 87536 HIV-1 QUANT&REVRSE TRNSCRPJ: CPT | Performed by: INTERNAL MEDICINE

## 2021-12-02 PROCEDURE — 90670 PCV13 VACCINE IM: CPT | Mod: S$GLB,,, | Performed by: INTERNAL MEDICINE

## 2021-12-02 PROCEDURE — 99999 PR PBB SHADOW E&M-EST. PATIENT-LVL III: ICD-10-PCS | Mod: PBBFAC,,, | Performed by: INTERNAL MEDICINE

## 2021-12-02 PROCEDURE — 87040 BLOOD CULTURE FOR BACTERIA: CPT | Performed by: INTERNAL MEDICINE

## 2021-12-02 PROCEDURE — 87798 DETECT AGENT NOS DNA AMP: CPT | Performed by: INTERNAL MEDICINE

## 2021-12-02 PROCEDURE — 90670 PNEUMOCOCCAL CONJUGATE VACCINE 13-VALENT LESS THAN 5YO & GREATER THAN: ICD-10-PCS | Mod: S$GLB,,, | Performed by: INTERNAL MEDICINE

## 2021-12-02 PROCEDURE — 87491 CHLMYD TRACH DNA AMP PROBE: CPT | Performed by: INTERNAL MEDICINE

## 2021-12-02 PROCEDURE — 99999 PR PBB SHADOW E&M-EST. PATIENT-LVL III: CPT | Mod: PBBFAC,,, | Performed by: INTERNAL MEDICINE

## 2021-12-02 PROCEDURE — 4010F PR ACE/ARB THEARPY RXD/TAKEN: ICD-10-PCS | Mod: CPTII,S$GLB,, | Performed by: INTERNAL MEDICINE

## 2021-12-02 PROCEDURE — 86593 SYPHILIS TEST NON-TREP QUANT: CPT | Performed by: INTERNAL MEDICINE

## 2021-12-02 PROCEDURE — 99215 OFFICE O/P EST HI 40 MIN: CPT | Mod: 25,S$GLB,, | Performed by: INTERNAL MEDICINE

## 2021-12-02 PROCEDURE — 80053 COMPREHEN METABOLIC PANEL: CPT | Performed by: INTERNAL MEDICINE

## 2021-12-02 PROCEDURE — 84403 ASSAY OF TOTAL TESTOSTERONE: CPT | Performed by: INTERNAL MEDICINE

## 2021-12-02 PROCEDURE — 86361 T CELL ABSOLUTE COUNT: CPT | Performed by: INTERNAL MEDICINE

## 2021-12-02 PROCEDURE — 99215 PR OFFICE/OUTPT VISIT, EST, LEVL V, 40-54 MIN: ICD-10-PCS | Mod: 25,S$GLB,, | Performed by: INTERNAL MEDICINE

## 2021-12-02 PROCEDURE — 86592 SYPHILIS TEST NON-TREP QUAL: CPT | Performed by: INTERNAL MEDICINE

## 2021-12-02 PROCEDURE — 81003 URINALYSIS AUTO W/O SCOPE: CPT | Performed by: INTERNAL MEDICINE

## 2021-12-02 PROCEDURE — 90471 PNEUMOCOCCAL CONJUGATE VACCINE 13-VALENT LESS THAN 5YO & GREATER THAN: ICD-10-PCS | Mod: S$GLB,,, | Performed by: INTERNAL MEDICINE

## 2021-12-02 PROCEDURE — 86780 TREPONEMA PALLIDUM: CPT | Performed by: INTERNAL MEDICINE

## 2021-12-02 RX ORDER — LISINOPRIL 20 MG/1
20 TABLET ORAL DAILY
Qty: 30 TABLET | Refills: 11 | Status: SHIPPED | OUTPATIENT
Start: 2021-12-02 | End: 2022-04-29 | Stop reason: SDUPTHER

## 2021-12-03 ENCOUNTER — LAB VISIT (OUTPATIENT)
Dept: LAB | Facility: HOSPITAL | Age: 41
End: 2021-12-03
Attending: INTERNAL MEDICINE
Payer: COMMERCIAL

## 2021-12-03 DIAGNOSIS — A09 INFECTIOUS DIARRHEA: ICD-10-CM

## 2021-12-03 LAB
CD3+CD4+ CELLS # BLD: 878 CELLS/UL (ref 300–1400)
CD3+CD4+ CELLS NFR BLD: 39.6 % (ref 28–57)
RPR SER QL: REACTIVE
RPR SER-TITR: ABNORMAL {TITER}

## 2021-12-03 PROCEDURE — 87324 CLOSTRIDIUM AG IA: CPT | Performed by: INTERNAL MEDICINE

## 2021-12-03 PROCEDURE — 87449 NOS EACH ORGANISM AG IA: CPT | Performed by: INTERNAL MEDICINE

## 2021-12-04 LAB
C DIFF GDH STL QL: NEGATIVE
C DIFF TOX A+B STL QL IA: NEGATIVE
HIV1 RNA # PLAS NAA DL=20: <20 COPIES/ML

## 2021-12-06 LAB
C TRACH DNA SPEC QL NAA+PROBE: NOT DETECTED
MYCOPLASMA GENITALIUM RESULT: NEGATIVE
N GONORRHOEA DNA SPEC QL NAA+PROBE: NOT DETECTED
SPECIMEN SOURCE: NORMAL
T PALLIDUM AB SER QL IF: REACTIVE

## 2021-12-07 LAB — BACTERIA BLD CULT: NORMAL

## 2021-12-10 LAB
ALBUMIN SERPL-MCNC: 4.6 G/DL (ref 3.6–5.1)
SHBG SERPL-SCNC: 11 NMOL/L (ref 10–50)
TESTOST FREE SERPL-MCNC: 89.4 PG/ML (ref 46–224)
TESTOST SERPL-MCNC: 338 NG/DL (ref 250–1100)
TESTOSTERONE.FREE+WB SERPL-MCNC: 187.8 NG/DL (ref 110–575)

## 2021-12-12 ENCOUNTER — PATIENT MESSAGE (OUTPATIENT)
Dept: INFECTIOUS DISEASES | Facility: CLINIC | Age: 41
End: 2021-12-12
Payer: COMMERCIAL

## 2021-12-18 ENCOUNTER — PATIENT MESSAGE (OUTPATIENT)
Dept: PHARMACY | Facility: CLINIC | Age: 41
End: 2021-12-18
Payer: COMMERCIAL

## 2021-12-21 ENCOUNTER — SPECIALTY PHARMACY (OUTPATIENT)
Dept: PHARMACY | Facility: CLINIC | Age: 41
End: 2021-12-21
Payer: COMMERCIAL

## 2021-12-28 ENCOUNTER — PATIENT MESSAGE (OUTPATIENT)
Dept: PHARMACY | Facility: CLINIC | Age: 41
End: 2021-12-28
Payer: COMMERCIAL

## 2022-01-04 ENCOUNTER — SPECIALTY PHARMACY (OUTPATIENT)
Dept: PHARMACY | Facility: CLINIC | Age: 42
End: 2022-01-04
Payer: COMMERCIAL

## 2022-01-04 NOTE — TELEPHONE ENCOUNTER
Specialty Pharmacy - Refill Coordination    Specialty Medication Orders Linked to Encounter    Flowsheet Row Most Recent Value   Medication #1 imkkhsofu-kdllfmww-sylfswt ala (BIKTARVY) -25 mg per tablet (Order#[id number], Rx#[id number])          Refill Questions - Documented Responses    Flowsheet Row Most Recent Value   Patient Availability and HIPAA Verification    Does patient want to proceed with activity? Yes   HIPAA/medical authority confirmed? Yes   Relationship to patient of person spoken to? Self   Refill Screening Questions    Changes to allergies? No   Changes to medications? No   New conditions since last clinic visit? No   Unplanned office visit, urgent care, ED, or hospital admission in the last 4 weeks? No   How does patient/caregiver feel medication is working? Good   Financial problems or insurance changes? No   How many doses of your specialty medications were missed in the last 4 weeks? 0   Would patient like to speak to a pharmacist? No   When does the patient need to receive the medication? 01/07/22   Refill Delivery Questions    How will the patient receive the medication? Pickup   When does the patient need to receive the medication? 01/07/22   Shipping Address Home   Address in [facility] confirmed and updated if neccessary? Yes   Expected Copay ($) 0   Is the patient able to afford the medication copay? Yes   Payment Method zero copay   Days supply of Refill 30   Supplies needed? No supplies needed   Refill activity completed? Yes   Refill activity plan Refill scheduled   Shipment/Pickup Date: 01/04/22          Current Outpatient Medications   Medication Sig    qqnrfhhrw-vbctapcu-vrqnsla ala (BIKTARVY) -25 mg per tablet Take 1 tablet by mouth once daily.    lisinopriL (PRINIVIL,ZESTRIL) 20 MG tablet Take 1 tablet (20 mg total) by mouth once daily.   Last reviewed on 12/12/2021 10:00 PM by Beto Bragg MD    Review of patient's allergies indicates:  No Known Allergies  Last reviewed on  12/12/2021 10:00 PM by Beto Bragg      Tasks added this encounter   No tasks added.   Tasks due within next 3 months   1/19/2022 - Refill Call (Auto Added)  12/28/2021 - Pickup Reminder     [facility]  [address]  [facility] [address]  Phone: [phone]  Fax: [phone]

## 2022-01-27 ENCOUNTER — IMMUNIZATION (OUTPATIENT)
Dept: PHARMACY | Facility: CLINIC | Age: 42
End: 2022-01-27
Payer: COMMERCIAL

## 2022-01-27 ENCOUNTER — PATIENT MESSAGE (OUTPATIENT)
Dept: PHARMACY | Facility: CLINIC | Age: 42
End: 2022-01-27
Payer: COMMERCIAL

## 2022-01-27 DIAGNOSIS — Z23 NEED FOR VACCINATION: Primary | ICD-10-CM

## 2022-02-02 ENCOUNTER — SPECIALTY PHARMACY (OUTPATIENT)
Dept: PHARMACY | Facility: CLINIC | Age: 42
End: 2022-02-02
Payer: COMMERCIAL

## 2022-02-02 ENCOUNTER — CLINICAL SUPPORT (OUTPATIENT)
Dept: INFECTIOUS DISEASES | Facility: CLINIC | Age: 42
End: 2022-02-02
Payer: COMMERCIAL

## 2022-02-02 DIAGNOSIS — B20 HUMAN IMMUNODEFICIENCY VIRUS (HIV) DISEASE: ICD-10-CM

## 2022-02-02 DIAGNOSIS — Z00.00 HEALTH MAINTENANCE EXAMINATION: ICD-10-CM

## 2022-02-02 PROCEDURE — 99999 PR PBB SHADOW E&M-EST. PATIENT-LVL I: ICD-10-PCS | Mod: PBBFAC,,,

## 2022-02-02 PROCEDURE — 90471 IMMUNIZATION ADMIN: CPT | Mod: S$GLB,,, | Performed by: INTERNAL MEDICINE

## 2022-02-02 PROCEDURE — 90732 PPSV23 VACC 2 YRS+ SUBQ/IM: CPT | Mod: S$GLB,,, | Performed by: INTERNAL MEDICINE

## 2022-02-02 PROCEDURE — 99999 PR PBB SHADOW E&M-EST. PATIENT-LVL I: CPT | Mod: PBBFAC,,,

## 2022-02-02 PROCEDURE — 90471 PNEUMOCOCCAL POLYSACCHARIDE VACCINE 23-VALENT =>2YO SQ IM: ICD-10-PCS | Mod: S$GLB,,, | Performed by: INTERNAL MEDICINE

## 2022-02-02 PROCEDURE — 90732 PNEUMOCOCCAL POLYSACCHARIDE VACCINE 23-VALENT =>2YO SQ IM: ICD-10-PCS | Mod: S$GLB,,, | Performed by: INTERNAL MEDICINE

## 2022-02-02 NOTE — PROGRESS NOTES
Patient received  Pneumovax vaccine in the left deltoid. Pt tolerated well. Pt asked to wait in the clinic 15 minutes after injection in the event of an allergic reaction. Pt verbalized understanding. Pt left in NAD.

## 2022-02-02 NOTE — TELEPHONE ENCOUNTER
Specialty Pharmacy - Refill Coordination    Specialty Medication Orders Linked to Encounter    Flowsheet Row Most Recent Value   Medication #1 uydceufoh-qgcqhzoe-mmuonud ala (BIKTARVY) -25 mg per tablet (Order#[id number], Rx#[id number])          Refill Questions - Documented Responses    Flowsheet Row Most Recent Value   Patient Availability and HIPAA Verification    Does patient want to proceed with activity? Yes   HIPAA/medical authority confirmed? Yes   Relationship to patient of person spoken to? Self   Refill Screening Questions    Changes to allergies? No   Changes to medications? No   New conditions since last clinic visit? No   Unplanned office visit, urgent care, ED, or hospital admission in the last 4 weeks? No   How many doses of your specialty medications were missed in the last 4 weeks? 0   Would patient like to speak to a pharmacist? No   When does the patient need to receive the medication? 02/04/22   Refill Delivery Questions    How will the patient receive the medication? Pickup   When does the patient need to receive the medication? 02/04/22   Shipping Address Home   Address in [facility] confirmed and updated if neccessary? Yes   Expected Copay ($) 0   Is the patient able to afford the medication copay? Yes   Payment Method zero copay   Days supply of Refill 0   Supplies needed? No supplies needed   Refill activity completed? Yes   Refill activity plan Refill scheduled   Shipment/Pickup Date: 02/04/22          Current Outpatient Medications   Medication Sig    ycbmjxdhk-vvasihkg-dsebugz ala (BIKTARVY) -25 mg per tablet Take 1 tablet by mouth once daily.    lisinopriL (PRINIVIL,ZESTRIL) 20 MG tablet Take 1 tablet (20 mg total) by mouth once daily.   Last reviewed on 12/12/2021 10:00 PM by Beto Bragg MD    Review of patient's allergies indicates:  No Known Allergies Last reviewed on  2/2/2022 10:16 AM by Syl May      Tasks added this encounter   2/5/2022 - Refill  Call (Auto Added)  2/5/2022 - Pickup Reminder   Tasks due within next 3 months   No tasks due.     [facility]  [address]  [facility] [address]  Phone: [phone]  Fax: [phone]

## 2022-03-04 ENCOUNTER — SPECIALTY PHARMACY (OUTPATIENT)
Dept: PHARMACY | Facility: CLINIC | Age: 42
End: 2022-03-04
Payer: COMMERCIAL

## 2022-03-04 ENCOUNTER — PATIENT MESSAGE (OUTPATIENT)
Dept: PHARMACY | Facility: CLINIC | Age: 42
End: 2022-03-04
Payer: COMMERCIAL

## 2022-03-08 ENCOUNTER — OFFICE VISIT (OUTPATIENT)
Dept: URGENT CARE | Facility: CLINIC | Age: 42
End: 2022-03-08
Payer: COMMERCIAL

## 2022-03-08 VITALS
HEART RATE: 88 BPM | TEMPERATURE: 98 F | DIASTOLIC BLOOD PRESSURE: 83 MMHG | BODY MASS INDEX: 39.76 KG/M2 | HEIGHT: 71 IN | RESPIRATION RATE: 17 BRPM | WEIGHT: 284 LBS | OXYGEN SATURATION: 96 % | SYSTOLIC BLOOD PRESSURE: 152 MMHG

## 2022-03-08 DIAGNOSIS — R05.9 COUGH: ICD-10-CM

## 2022-03-08 DIAGNOSIS — Z87.898 HISTORY OF POSTNASAL DRIP: ICD-10-CM

## 2022-03-08 DIAGNOSIS — R52 BODY ACHES: ICD-10-CM

## 2022-03-08 DIAGNOSIS — J00 ACUTE NASOPHARYNGITIS: Primary | ICD-10-CM

## 2022-03-08 LAB
CTP QC/QA: YES
POC MOLECULAR INFLUENZA A AGN: NEGATIVE
POC MOLECULAR INFLUENZA B AGN: NEGATIVE

## 2022-03-08 PROCEDURE — 1160F RVW MEDS BY RX/DR IN RCRD: CPT | Mod: CPTII,S$GLB,, | Performed by: NURSE PRACTITIONER

## 2022-03-08 PROCEDURE — 3079F PR MOST RECENT DIASTOLIC BLOOD PRESSURE 80-89 MM HG: ICD-10-PCS | Mod: CPTII,S$GLB,, | Performed by: NURSE PRACTITIONER

## 2022-03-08 PROCEDURE — 3008F BODY MASS INDEX DOCD: CPT | Mod: CPTII,S$GLB,, | Performed by: NURSE PRACTITIONER

## 2022-03-08 PROCEDURE — 1159F PR MEDICATION LIST DOCUMENTED IN MEDICAL RECORD: ICD-10-PCS | Mod: CPTII,S$GLB,, | Performed by: NURSE PRACTITIONER

## 2022-03-08 PROCEDURE — 3077F SYST BP >= 140 MM HG: CPT | Mod: CPTII,S$GLB,, | Performed by: NURSE PRACTITIONER

## 2022-03-08 PROCEDURE — 3077F PR MOST RECENT SYSTOLIC BLOOD PRESSURE >= 140 MM HG: ICD-10-PCS | Mod: CPTII,S$GLB,, | Performed by: NURSE PRACTITIONER

## 2022-03-08 PROCEDURE — 4010F ACE/ARB THERAPY RXD/TAKEN: CPT | Mod: CPTII,S$GLB,, | Performed by: NURSE PRACTITIONER

## 2022-03-08 PROCEDURE — 99213 PR OFFICE/OUTPT VISIT, EST, LEVL III, 20-29 MIN: ICD-10-PCS | Mod: S$GLB,,, | Performed by: NURSE PRACTITIONER

## 2022-03-08 PROCEDURE — 87502 POCT INFLUENZA A/B MOLECULAR: ICD-10-PCS | Mod: QW,S$GLB,, | Performed by: NURSE PRACTITIONER

## 2022-03-08 PROCEDURE — 3008F PR BODY MASS INDEX (BMI) DOCUMENTED: ICD-10-PCS | Mod: CPTII,S$GLB,, | Performed by: NURSE PRACTITIONER

## 2022-03-08 PROCEDURE — 4010F PR ACE/ARB THEARPY RXD/TAKEN: ICD-10-PCS | Mod: CPTII,S$GLB,, | Performed by: NURSE PRACTITIONER

## 2022-03-08 PROCEDURE — 99213 OFFICE O/P EST LOW 20 MIN: CPT | Mod: S$GLB,,, | Performed by: NURSE PRACTITIONER

## 2022-03-08 PROCEDURE — 3079F DIAST BP 80-89 MM HG: CPT | Mod: CPTII,S$GLB,, | Performed by: NURSE PRACTITIONER

## 2022-03-08 PROCEDURE — 1159F MED LIST DOCD IN RCRD: CPT | Mod: CPTII,S$GLB,, | Performed by: NURSE PRACTITIONER

## 2022-03-08 PROCEDURE — 1160F PR REVIEW ALL MEDS BY PRESCRIBER/CLIN PHARMACIST DOCUMENTED: ICD-10-PCS | Mod: CPTII,S$GLB,, | Performed by: NURSE PRACTITIONER

## 2022-03-08 PROCEDURE — 71046 XR CHEST PA AND LATERAL: ICD-10-PCS | Mod: FY,S$GLB,, | Performed by: RADIOLOGY

## 2022-03-08 PROCEDURE — 71046 X-RAY EXAM CHEST 2 VIEWS: CPT | Mod: FY,S$GLB,, | Performed by: RADIOLOGY

## 2022-03-08 PROCEDURE — 87502 INFLUENZA DNA AMP PROBE: CPT | Mod: QW,S$GLB,, | Performed by: NURSE PRACTITIONER

## 2022-03-08 RX ORDER — FLUTICASONE PROPIONATE 50 MCG
2 SPRAY, SUSPENSION (ML) NASAL DAILY
Qty: 16 G | Refills: 0 | Status: SHIPPED | OUTPATIENT
Start: 2022-03-08 | End: 2022-10-07 | Stop reason: ALTCHOICE

## 2022-03-08 RX ORDER — PROMETHAZINE HYDROCHLORIDE AND DEXTROMETHORPHAN HYDROBROMIDE 6.25; 15 MG/5ML; MG/5ML
5 SYRUP ORAL NIGHTLY PRN
Qty: 120 ML | Refills: 0 | Status: SHIPPED | OUTPATIENT
Start: 2022-03-08 | End: 2022-03-18

## 2022-03-08 NOTE — PROGRESS NOTES
"Subjective:       Patient ID: Francisco May II is a 41 y.o. male.    Vitals:  height is 5' 11" (1.803 m) and weight is 128.8 kg (284 lb). His temperature is 98.2 °F (36.8 °C). His blood pressure is 152/83 (abnormal) and his pulse is 88. His respiration is 17 and oxygen saturation is 96%.     Chief Complaint: Sinus Problem    Pt presents with c o nasal congestion, sneezing, cough, sputum production, nausea, vomiting, sob, sore throat, hoarse voice, body aches x 4 days. States he sleeps with a C-PAP machine. Treating sx with cough drops and Mucinex-DM every 12 hours. Covid vaccinated. Positive for covid in December 2021. No known covid exposures.  Symptoms started after walking in [place].  Denies fever.  Feels like his symptoms are actually improving this morning.  Last night he says was a bad night with difficulty getting comfortable with his CPAP, at one point felt it was smothering and had to sleep without it.      Sinus Problem  This is a recurrent problem. The current episode started in the past 7 days. There has been no fever. Associated symptoms include chills, congestion, coughing, diaphoresis, a hoarse voice, shortness of breath (while he was sleeping, denies orthopnea), sinus pressure (on saturday - resolved), sneezing, a sore throat and swollen glands. Pertinent negatives include no ear pain, headaches or neck pain. Past treatments include oral decongestants.       Constitution: Positive for chills and sweating. Negative for fatigue and fever.   HENT: Positive for congestion, postnasal drip, sinus pressure (on saturday - resolved), sore throat and voice change (improving). Negative for ear pain, sinus pain and trouble swallowing.    Neck: Negative for neck pain.   Cardiovascular: Negative for chest pain, leg swelling, palpitations and sob on exertion.   Respiratory: Positive for sleep apnea, cough, sputum production (clear and mucus) and shortness of breath (while he was sleeping, denies " orthopnea). Negative for chest tightness.    Gastrointestinal: Positive for vomiting (water - due to cough). Negative for abdominal pain, nausea and diarrhea.   Allergic/Immunologic: Positive for sneezing.   Neurological: Negative for headaches.       Objective:      Physical Exam   Constitutional: He is oriented to person, place, and time. He appears well-developed. He is cooperative.  Non-toxic appearance. He does not appear ill. No distress.   HENT:   Head: Normocephalic and atraumatic.   Ears:   Right Ear: Hearing, external ear and ear canal normal. A middle ear effusion is present.   Left Ear: Hearing, external ear and ear canal normal. A middle ear effusion is present.   Nose: Mucosal edema present. No rhinorrhea, sinus tenderness or nasal deformity. No epistaxis. Right sinus exhibits no maxillary sinus tenderness and no frontal sinus tenderness. Left sinus exhibits no maxillary sinus tenderness and no frontal sinus tenderness.   Mouth/Throat: Uvula is midline, oropharynx is clear and moist and mucous membranes are normal. No trismus in the jaw. Normal dentition. No uvula swelling. No oropharyngeal exudate, posterior oropharyngeal edema or posterior oropharyngeal erythema.   Bilateral serous fluid    + hoarseness      Comments: Bilateral serous fluid    + hoarseness  Eyes: Conjunctivae and lids are normal. No scleral icterus.   Neck: Trachea normal and phonation normal. Neck supple. No edema present. No erythema present. No neck rigidity present.   Cardiovascular: Normal rate, regular rhythm, normal heart sounds and normal pulses.   Pulmonary/Chest: Effort normal and breath sounds normal. No respiratory distress. He has no decreased breath sounds. He has no wheezes. He has no rhonchi.   Abdominal: Normal appearance.   Musculoskeletal: Normal range of motion.         General: No deformity. Normal range of motion.   Neurological: He is alert and oriented to person, place, and time. He exhibits normal muscle  tone. Coordination normal.   Skin: Skin is warm, dry, intact, not diaphoretic and not pale.   Psychiatric: His speech is normal and behavior is normal. Judgment and thought content normal.   Nursing note and vitals reviewed.    Results for orders placed or performed in visit on 03/08/22   POCT Influenza A/B MOLECULAR   Result Value Ref Range    POC Molecular Influenza A Ag Negative Negative, Not Reported    POC Molecular Influenza B Ag Negative Negative, Not Reported     Acceptable Yes        XR CHEST PA AND LATERAL    Result Date: 3/8/2022  EXAMINATION: XR CHEST PA AND LATERAL CLINICAL HISTORY: Cough, unspecified TECHNIQUE: PA and lateral views of the chest were performed. COMPARISON: None FINDINGS: The lungs are clear, with normal appearance of pulmonary vasculature and no pleural effusion or pneumothorax. The cardiac silhouette is normal in size. The hilar and mediastinal contours are unremarkable. Bones are intact.     No acute abnormality. Electronically signed by: Julissa Reddy Date:    03/08/2022 Time:    09:29        Assessment:       1. Acute nasopharyngitis    2. Body aches    3. Cough    4. History of postnasal drip          Plan:       Xray and flu reviewed, negative.    Improving symptoms, otc meds and symptomatic control of symptoms encouraged.   F/u with PCP if symptoms persist.  Strict ER precautions given.  Acute nasopharyngitis  -     fluticasone propionate (FLONASE) 50 mcg/actuation nasal spray; 2 sprays (100 mcg total) by Each Nostril route once daily.  Dispense: 16 g; Refill: 0  -     promethazine-dextromethorphan (PROMETHAZINE-DM) 6.25-15 mg/5 mL Syrp; Take 5 mLs by mouth nightly as needed (cough).  Dispense: 120 mL; Refill: 0    Body aches  -     POCT Influenza A/B MOLECULAR    Cough  -     XR CHEST PA AND LATERAL; Future; Expected date: 03/08/2022  -     promethazine-dextromethorphan (PROMETHAZINE-DM) 6.25-15 mg/5 mL Syrp; Take 5 mLs by mouth nightly as needed (cough).   Dispense: 120 mL; Refill: 0    History of postnasal drip  -     fluticasone propionate (FLONASE) 50 mcg/actuation nasal spray; 2 sprays (100 mcg total) by Each Nostril route once daily.  Dispense: 16 g; Refill: 0      Patient Instructions   Please drink plenty of fluids.  Please get plenty of rest.  Please return here or go to the Emergency Department for any concerns or worsening of condition.  If you do not have Hypertension or any history of palpitations, it is ok to take over the counter Sudafed or Mucinex D or Allegra-D or Claritin-D or Zyrtec-D.  If you do take one of the above, it is ok to combine that with plain over the counter Mucinex or Allegra or Claritin or Zyrtec.  If for example you are taking Zyrtec -D, you can combine that with Mucinex, but not Mucinex-D.  If you are taking Mucinex-D, you can combine that with plain Allegra or Claritin or Zyrtec.   If you do have Hypertension or palpitations, it is safe to take Coricidin HBP for relief of sinus symptoms.  If not allergic, please take over the counter Tylenol (Acetaminophen) and/or Motrin (Ibuprofen) as directed for control of pain and/or fever.  Please follow up with your primary care doctor or specialist as needed.    If you  smoke, please stop smoking.

## 2022-03-08 NOTE — PATIENT INSTRUCTIONS
Please drink plenty of fluids.  Please get plenty of rest.  Please return here or go to the Emergency Department for any concerns or worsening of condition.  If you do not have Hypertension or any history of palpitations, it is ok to take over the counter Sudafed or Mucinex D or Allegra-D or Claritin-D or Zyrtec-D.  If you do take one of the above, it is ok to combine that with plain over the counter Mucinex or Allegra or Claritin or Zyrtec.  If for example you are taking Zyrtec -D, you can combine that with Mucinex, but not Mucinex-D.  If you are taking Mucinex-D, you can combine that with plain Allegra or Claritin or Zyrtec.   If you do have Hypertension or palpitations, it is safe to take Coricidin HBP for relief of sinus symptoms.  If not allergic, please take over the counter Tylenol (Acetaminophen) and/or Motrin (Ibuprofen) as directed for control of pain and/or fever.  Please follow up with your primary care doctor or specialist as needed.    If you  smoke, please stop smoking.

## 2022-03-09 ENCOUNTER — SPECIALTY PHARMACY (OUTPATIENT)
Dept: PHARMACY | Facility: CLINIC | Age: 42
End: 2022-03-09
Payer: COMMERCIAL

## 2022-03-09 NOTE — TELEPHONE ENCOUNTER
Specialty Pharmacy - Refill Coordination    Specialty Medication Orders Linked to Encounter    Flowsheet Row Most Recent Value   Medication #1 oyupjucxo-yqtbtdgt-lwmnbnt ala (BIKTARVY) -25 mg per tablet (Order#[id number], Rx#[id number])          Refill Questions - Documented Responses    Flowsheet Row Most Recent Value   Patient Availability and HIPAA Verification    Does patient want to proceed with activity? Yes   HIPAA/medical authority confirmed? Yes   Relationship to patient of person spoken to? Self   Refill Screening Questions    Changes to allergies? No   Changes to medications? No   New conditions since last clinic visit? No   Unplanned office visit, urgent care, ED, or hospital admission in the last 4 weeks? No   How does patient/caregiver feel medication is working? Excellent   Financial problems or insurance changes? No   How many doses of your specialty medications were missed in the last 4 weeks? 1   Why were doses missed? Simply forgot   Would patient like to speak to a pharmacist? No   When does the patient need to receive the medication? 03/15/22   Refill Delivery Questions    How will the patient receive the medication? Pickup   When does the patient need to receive the medication? 03/15/22   Shipping Address --  [N/A ]   Address in [facility] confirmed and updated if neccessary? --  [N/A ]   Expected Copay ($) 0   Is the patient able to afford the medication copay? Yes   Payment Method zero copay   Days supply of Refill 30   Supplies needed? No supplies needed   Refill activity completed? Yes   Refill activity plan Refill scheduled   Shipment/Pickup Date: 03/12/22          Current Outpatient Medications   Medication Sig    bjkefabit-mtfbhkbi-japetty ala (BIKTARVY) -25 mg per tablet Take 1 tablet by mouth once daily.    fluticasone propionate (FLONASE) 50 mcg/actuation nasal spray 2 sprays (100 mcg total) by Each Nostril route once daily.    lisinopriL  (PRINIVIL,ZESTRIL) 20 MG tablet Take 1 tablet (20 mg total) by mouth once daily.    promethazine-dextromethorphan (PROMETHAZINE-DM) 6.25-15 mg/5 mL Syrp Take 5 mLs by mouth nightly as needed (cough).   Last reviewed on 3/8/2022  9:40 AM by Sheila Boucher NP    Review of patient's allergies indicates:  No Known Allergies Last reviewed on  3/8/2022 9:40 AM by Sheila Boucher    Spoke with Mr. May. He reported 1 missed dose. Counseled on the importance of compliance. He reported a 7-day supply in his possession. He denied any changes in allergies, medical conditions, or medications.  scheduled for 3/11. $0 copay at 004. Confirmed 2 patient identifiers, allergies, medication list, comorbidities, shipping address, and payment information.    Tasks added this encounter   4/7/2022 - Refill Call (Auto Added)  3/13/2022 - Pickup Reminder   Tasks due within next 3 months   No tasks due.     Faustina Summers, PharmD  [facility]  [address] [facility] [address]  Phone: [phone]  Fax: [phone]

## 2022-03-15 ENCOUNTER — PATIENT MESSAGE (OUTPATIENT)
Dept: PHARMACY | Facility: CLINIC | Age: 42
End: 2022-03-15
Payer: COMMERCIAL

## 2022-04-07 DIAGNOSIS — B20 HUMAN IMMUNODEFICIENCY VIRUS (HIV) DISEASE: ICD-10-CM

## 2022-04-08 RX ORDER — BICTEGRAVIR SODIUM, EMTRICITABINE, AND TENOFOVIR ALAFENAMIDE FUMARATE 50; 200; 25 MG/1; MG/1; MG/1
1 TABLET ORAL DAILY
Qty: 30 TABLET | Refills: 5 | Status: SHIPPED | OUTPATIENT
Start: 2022-04-08 | End: 2022-11-16 | Stop reason: SDUPTHER

## 2022-04-12 ENCOUNTER — PATIENT MESSAGE (OUTPATIENT)
Dept: PHARMACY | Facility: CLINIC | Age: 42
End: 2022-04-12
Payer: COMMERCIAL

## 2022-04-18 ENCOUNTER — PATIENT MESSAGE (OUTPATIENT)
Dept: ADMINISTRATIVE | Facility: OTHER | Age: 42
End: 2022-04-18
Payer: COMMERCIAL

## 2022-04-20 ENCOUNTER — LAB VISIT (OUTPATIENT)
Dept: LAB | Facility: HOSPITAL | Age: 42
End: 2022-04-20
Attending: INTERNAL MEDICINE
Payer: COMMERCIAL

## 2022-04-20 ENCOUNTER — SPECIALTY PHARMACY (OUTPATIENT)
Dept: PHARMACY | Facility: CLINIC | Age: 42
End: 2022-04-20
Payer: COMMERCIAL

## 2022-04-20 DIAGNOSIS — B20 HUMAN IMMUNODEFICIENCY VIRUS (HIV) DISEASE: ICD-10-CM

## 2022-04-20 LAB
ALBUMIN SERPL BCP-MCNC: 4 G/DL (ref 3.5–5.2)
ALP SERPL-CCNC: 66 U/L (ref 55–135)
ALT SERPL W/O P-5'-P-CCNC: 29 U/L (ref 10–44)
ANION GAP SERPL CALC-SCNC: 7 MMOL/L (ref 8–16)
AST SERPL-CCNC: 21 U/L (ref 10–40)
BASOPHILS # BLD AUTO: 0.08 K/UL (ref 0–0.2)
BASOPHILS NFR BLD: 1.3 % (ref 0–1.9)
BILIRUB SERPL-MCNC: 1.2 MG/DL (ref 0.1–1)
BUN SERPL-MCNC: 15 MG/DL (ref 6–20)
CALCIUM SERPL-MCNC: 9.5 MG/DL (ref 8.7–10.5)
CD3+CD4+ CELLS # BLD: 647 CELLS/UL (ref 300–1400)
CD3+CD4+ CELLS NFR BLD: 32.7 % (ref 28–57)
CHLORIDE SERPL-SCNC: 102 MMOL/L (ref 95–110)
CHOLEST SERPL-MCNC: 200 MG/DL (ref 120–199)
CHOLEST/HDLC SERPL: 3.9 {RATIO} (ref 2–5)
CO2 SERPL-SCNC: 29 MMOL/L (ref 23–29)
CREAT SERPL-MCNC: 1.1 MG/DL (ref 0.5–1.4)
DIFFERENTIAL METHOD: NORMAL
EOSINOPHIL # BLD AUTO: 0.4 K/UL (ref 0–0.5)
EOSINOPHIL NFR BLD: 5.8 % (ref 0–8)
ERYTHROCYTE [DISTWIDTH] IN BLOOD BY AUTOMATED COUNT: 13 % (ref 11.5–14.5)
EST. GFR  (AFRICAN AMERICAN): >60 ML/MIN/1.73 M^2
EST. GFR  (NON AFRICAN AMERICAN): >60 ML/MIN/1.73 M^2
GLUCOSE SERPL-MCNC: 95 MG/DL (ref 70–110)
HCT VFR BLD AUTO: 44.9 % (ref 40–54)
HDLC SERPL-MCNC: 51 MG/DL (ref 40–75)
HDLC SERPL: 25.5 % (ref 20–50)
HGB BLD-MCNC: 14.8 G/DL (ref 14–18)
IMM GRANULOCYTES # BLD AUTO: 0.01 K/UL (ref 0–0.04)
IMM GRANULOCYTES NFR BLD AUTO: 0.2 % (ref 0–0.5)
LDLC SERPL CALC-MCNC: 113.4 MG/DL (ref 63–159)
LYMPHOCYTES # BLD AUTO: 1.9 K/UL (ref 1–4.8)
LYMPHOCYTES NFR BLD: 31.1 % (ref 18–48)
MCH RBC QN AUTO: 29.3 PG (ref 27–31)
MCHC RBC AUTO-ENTMCNC: 33 G/DL (ref 32–36)
MCV RBC AUTO: 89 FL (ref 82–98)
MONOCYTES # BLD AUTO: 0.5 K/UL (ref 0.3–1)
MONOCYTES NFR BLD: 8.6 % (ref 4–15)
NEUTROPHILS # BLD AUTO: 3.2 K/UL (ref 1.8–7.7)
NEUTROPHILS NFR BLD: 53 % (ref 38–73)
NONHDLC SERPL-MCNC: 149 MG/DL
NRBC BLD-RTO: 0 /100 WBC
PLATELET # BLD AUTO: 293 K/UL (ref 150–450)
PMV BLD AUTO: 9.8 FL (ref 9.2–12.9)
POTASSIUM SERPL-SCNC: 4.5 MMOL/L (ref 3.5–5.1)
PROT SERPL-MCNC: 7 G/DL (ref 6–8.4)
RBC # BLD AUTO: 5.05 M/UL (ref 4.6–6.2)
RPR SER QL: REACTIVE
RPR SER-TITR: ABNORMAL {TITER}
SODIUM SERPL-SCNC: 138 MMOL/L (ref 136–145)
TRIGL SERPL-MCNC: 178 MG/DL (ref 30–150)
WBC # BLD AUTO: 6.05 K/UL (ref 3.9–12.7)

## 2022-04-20 PROCEDURE — 80053 COMPREHEN METABOLIC PANEL: CPT | Performed by: INTERNAL MEDICINE

## 2022-04-20 PROCEDURE — 86361 T CELL ABSOLUTE COUNT: CPT | Performed by: INTERNAL MEDICINE

## 2022-04-20 PROCEDURE — 86593 SYPHILIS TEST NON-TREP QUANT: CPT | Performed by: INTERNAL MEDICINE

## 2022-04-20 PROCEDURE — 86592 SYPHILIS TEST NON-TREP QUAL: CPT | Performed by: INTERNAL MEDICINE

## 2022-04-20 PROCEDURE — 36415 COLL VENOUS BLD VENIPUNCTURE: CPT | Performed by: INTERNAL MEDICINE

## 2022-04-20 PROCEDURE — 87536 HIV-1 QUANT&REVRSE TRNSCRPJ: CPT | Performed by: INTERNAL MEDICINE

## 2022-04-20 PROCEDURE — 85025 COMPLETE CBC W/AUTO DIFF WBC: CPT | Performed by: INTERNAL MEDICINE

## 2022-04-20 PROCEDURE — 80061 LIPID PANEL: CPT | Performed by: INTERNAL MEDICINE

## 2022-04-20 PROCEDURE — 86780 TREPONEMA PALLIDUM: CPT | Performed by: INTERNAL MEDICINE

## 2022-04-20 NOTE — TELEPHONE ENCOUNTER
Specialty Pharmacy - Refill Coordination    Specialty Medication Orders Linked to Encounter    Flowsheet Row Most Recent Value   Medication #1 eilcvadqy-cofsmbca-tbfbeoh ala (BIKTARVY) -25 mg (25 kg or greater) (Order#[id number], Rx#[id number])          Refill Questions - Documented Responses    Flowsheet Row Most Recent Value   Patient Availability and HIPAA Verification    Does patient want to proceed with activity? Yes   HIPAA/medical authority confirmed? Yes   Relationship to patient of person spoken to? Self   Refill Screening Questions    Changes to allergies? No   Changes to medications? No   New conditions since last clinic visit? No   Unplanned office visit, urgent care, ED, or hospital admission in the last 4 weeks? No   How does patient/caregiver feel medication is working? Excellent   Financial problems or insurance changes? No   How many doses of your specialty medications were missed in the last 4 weeks? 0   Would patient like to speak to a pharmacist? No   When does the patient need to receive the medication? 04/23/22   Refill Delivery Questions    How will the patient receive the medication? Pickup   When does the patient need to receive the medication? 04/23/22   Shipping Address Home   Address in [facility] confirmed and updated if neccessary? Yes   Expected Copay ($) 0   Is the patient able to afford the medication copay? Yes   Payment Method zero copay   Days supply of Refill 30   Supplies needed? No supplies needed   Refill activity completed? Yes   Refill activity plan Refill scheduled   Shipment/Pickup Date: 04/21/22          Current Outpatient Medications   Medication Sig    oazxmduii-mmgkijvg-ewfqytc ala (BIKTARVY) -25 mg (25 kg or greater) Take 1 tablet by mouth once daily.    fluticasone propionate (FLONASE) 50 mcg/actuation nasal spray 2 sprays (100 mcg total) by Each Nostril route once daily.    lisinopriL (PRINIVIL,ZESTRIL) 20 MG tablet Take 1 tablet (20 mg total)  by mouth once daily.   Last reviewed on 4/8/2022  1:22 PM by Beto Bragg MD    Review of patient's allergies indicates:  No Known Allergies Last reviewed on  3/8/2022 9:40 AM by Sheila Boucher      Tasks added this encounter   5/16/2022 - Refill Call (Auto Added)  4/22/2022 - Pickup Reminder   Tasks due within next 3 months   No tasks due.     Aleah Reece, PharmD  [facility]  [address] [place] [address]  Phone: [phone]  Fax: [phone]

## 2022-04-22 ENCOUNTER — PATIENT MESSAGE (OUTPATIENT)
Dept: PHARMACY | Facility: CLINIC | Age: 42
End: 2022-04-22
Payer: COMMERCIAL

## 2022-04-22 LAB
HIV1 RNA # PLAS NAA DL=20: <20 COPIES/ML
T PALLIDUM AB SER QL IF: REACTIVE

## 2022-04-29 ENCOUNTER — OFFICE VISIT (OUTPATIENT)
Dept: INFECTIOUS DISEASES | Facility: CLINIC | Age: 42
End: 2022-04-29

## 2022-04-29 VITALS
BODY MASS INDEX: 40.99 KG/M2 | HEART RATE: 88 BPM | RESPIRATION RATE: 18 BRPM | SYSTOLIC BLOOD PRESSURE: 138 MMHG | TEMPERATURE: 99 F | WEIGHT: 293.88 LBS | DIASTOLIC BLOOD PRESSURE: 88 MMHG

## 2022-04-29 DIAGNOSIS — Z71.84 TRAVEL ADVICE ENCOUNTER: ICD-10-CM

## 2022-04-29 DIAGNOSIS — H65.411 CHRONIC ALLERGIC OTITIS MEDIA OF RIGHT EAR: ICD-10-CM

## 2022-04-29 DIAGNOSIS — B20 HUMAN IMMUNODEFICIENCY VIRUS (HIV) DISEASE: Primary | ICD-10-CM

## 2022-04-29 DIAGNOSIS — I10 ESSENTIAL HYPERTENSION: ICD-10-CM

## 2022-04-29 DIAGNOSIS — Z23 IMMUNIZATION DUE: ICD-10-CM

## 2022-04-29 PROCEDURE — 90717 YELLOW FEVER VACCINE SQ: ICD-10-PCS | Mod: S$GLB,,, | Performed by: INTERNAL MEDICINE

## 2022-04-29 PROCEDURE — 90691 TYPHOID VICPS VACCINE IM: ICD-10-PCS | Mod: S$GLB,,, | Performed by: INTERNAL MEDICINE

## 2022-04-29 PROCEDURE — 99999 PR PBB SHADOW E&M-EST. PATIENT-LVL III: CPT | Mod: PBBFAC,,, | Performed by: INTERNAL MEDICINE

## 2022-04-29 PROCEDURE — 99214 OFFICE O/P EST MOD 30 MIN: CPT | Mod: S$GLB,,, | Performed by: INTERNAL MEDICINE

## 2022-04-29 PROCEDURE — 90472 IMMUNIZATION ADMIN EACH ADD: CPT | Mod: S$GLB,,, | Performed by: INTERNAL MEDICINE

## 2022-04-29 PROCEDURE — 99214 PR OFFICE/OUTPT VISIT, EST, LEVL IV, 30-39 MIN: ICD-10-PCS | Mod: S$GLB,,, | Performed by: INTERNAL MEDICINE

## 2022-04-29 PROCEDURE — 99999 PR PBB SHADOW E&M-EST. PATIENT-LVL III: ICD-10-PCS | Mod: PBBFAC,,, | Performed by: INTERNAL MEDICINE

## 2022-04-29 PROCEDURE — 90471 TYPHOID VICPS VACCINE IM: ICD-10-PCS | Mod: S$GLB,,, | Performed by: INTERNAL MEDICINE

## 2022-04-29 PROCEDURE — 90717 YELLOW FEVER VACCINE SUBQ: CPT | Mod: S$GLB,,, | Performed by: INTERNAL MEDICINE

## 2022-04-29 PROCEDURE — 90691 TYPHOID VACCINE IM: CPT | Mod: S$GLB,,, | Performed by: INTERNAL MEDICINE

## 2022-04-29 PROCEDURE — 90471 IMMUNIZATION ADMIN: CPT | Mod: S$GLB,,, | Performed by: INTERNAL MEDICINE

## 2022-04-29 PROCEDURE — 90472 YELLOW FEVER VACCINE SQ: ICD-10-PCS | Mod: S$GLB,,, | Performed by: INTERNAL MEDICINE

## 2022-04-29 RX ORDER — ATOVAQUONE AND PROGUANIL HYDROCHLORIDE 250; 100 MG/1; MG/1
TABLET, FILM COATED ORAL
Qty: 25 TABLET | Refills: 0 | Status: SHIPPED | OUTPATIENT
Start: 2022-04-29 | End: 2022-10-07 | Stop reason: ALTCHOICE

## 2022-04-29 RX ORDER — LISINOPRIL 20 MG/1
20 TABLET ORAL DAILY
Qty: 30 TABLET | Refills: 11 | Status: SHIPPED | OUTPATIENT
Start: 2022-04-29 | End: 2022-05-26 | Stop reason: SDUPTHER

## 2022-04-29 RX ORDER — AZITHROMYCIN 500 MG/1
500 TABLET, FILM COATED ORAL DAILY
Qty: 3 TABLET | Refills: 0 | Status: SHIPPED | OUTPATIENT
Start: 2022-04-29 | End: 2022-05-02

## 2022-04-29 RX ORDER — CETIRIZINE HYDROCHLORIDE 10 MG/1
10 TABLET ORAL DAILY
Qty: 30 TABLET | Refills: 2 | Status: SHIPPED | OUTPATIENT
Start: 2022-04-29 | End: 2022-05-26 | Stop reason: SDUPTHER

## 2022-04-29 NOTE — PROGRESS NOTES
Subjective:      Patient ID: Francisco May II is a 41 y.o. male.    Chief Complaint:Travel Consult ([place], [facility])      History of Present Illness  HPI     Travel Consult      Additional comments: [place], [facility]          Last edited by Vlad Quijano MA on 4/29/2022  9:47 AM. (History)      41 y/o male with a history of HIV diagnosed in 2021.  He has since been started on biktarvy. He is here for follow up.  He reports problems with his right ear getting clogged.  He is planning an international trip and is seeking medical guidance prior to his trip.  Leaving June 9.  Going [place] first.  Then [facility].  Total duration of trip is 13 days.     Medical History  HTN  Hypotestosteronism  Depression.  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  Started the business in January 2020.     Review of Systems   All other systems reviewed and are negative.    Objective:   Physical Exam  Vitals and nursing note reviewed.   Constitutional:       General: He is not in acute distress.     Appearance: He is well-developed. He is not diaphoretic.   HENT:      Head: Normocephalic and atraumatic.      Right Ear: External ear normal.      Left Ear: External ear normal.      Nose: Nose normal.      Mouth/Throat:      Pharynx: No oropharyngeal exudate.   Eyes:      General: No scleral icterus.        Right eye: No discharge.         Left eye: No discharge.      Conjunctiva/sclera: Conjunctivae normal.      Pupils: Pupils are equal, round, and reactive to light.   Neck:      Thyroid: No thyromegaly.      Vascular: No JVD.      Trachea: No tracheal deviation.   Cardiovascular:      Rate and Rhythm: Normal rate and regular rhythm.      Heart sounds: No murmur heard.    No friction rub. No gallop.   Pulmonary:      Effort: Pulmonary  effort is normal. No respiratory distress.      Breath sounds: Normal breath sounds. No stridor. No wheezing or rales.   Chest:      Chest wall: No tenderness.   Abdominal:      General: Bowel sounds are normal. There is no distension.      Palpations: Abdomen is soft. There is no mass.      Tenderness: There is no abdominal tenderness. There is no guarding or rebound.   Musculoskeletal:         General: No tenderness. Normal range of motion.      Cervical back: Normal range of motion and neck supple.   Lymphadenopathy:      Cervical: No cervical adenopathy.   Skin:     General: Skin is warm.      Coloration: Skin is not pale.      Findings: No erythema or rash.   Neurological:      Mental Status: He is alert and oriented to person, place, and time.      Cranial Nerves: No cranial nerve deficit.      Motor: No abnormal muscle tone.      Coordination: Coordination normal.      Deep Tendon Reflexes: Reflexes are normal and symmetric. Reflexes normal.   Psychiatric:         Behavior: Behavior normal.         Thought Content: Thought content normal.         Judgment: Judgment normal.       CD4 % Dale T Cell   Date Value Ref Range Status   04/20/2022 32.7 28 - 57 % Final   12/02/2021 39.6 28 - 57 % Final   04/20/2021 23.7 (L) 28 - 57 % Final     Absolute CD4   Date Value Ref Range Status   04/20/2022 647 300 - 1400 cells/ul Final     Comment:     This test was developed and its performance characteristics   determined by [facility].    It has not been cleared or approved by the U.S. Food and Drug   Administration. The FDA has determined that such clearance or   approval is not necessary.  This test is used for clinical purposes.    It should not be regarded as investigational or for research.  This   laboratory is certified under CLIA-88 as qualified to perform high   complexity clinical laboratory testing.     12/02/2021 878 300 - 1400 cells/ul Final     Comment:     This test  was developed and its performance characteristics   determined by [facility].    It has not been cleared or approved by the U.S. Food and Drug   Administration. The FDA has determined that such clearance or   approval is not necessary.  This test is used for clinical purposes.    It should not be regarded as investigational or for research.  This   laboratory is certified under CLIA-88 as qualified to perform high   complexity clinical laboratory testing.     04/20/2021 492 300 - 1400 cells/ul Final     Comment:     This test was developed and its performance characteristics   determined by [facility].    It has not been cleared or approved by the U.S. Food and Drug   Administration. The FDA has determined that such clearance or   approval is not necessary.  This test is used for clinical purposes.    It should not be regarded as investigational or for research.  This   laboratory is certified under CLIA-88 as qualified to perform high   complexity clinical laboratory testing.       HIV-1 RNA, Quantitative   Date Value Ref Range Status   04/20/2022 <20 (A) Undetected copies/mL Final     Comment:     Result in log copies/mL is <1.30.    HIV-1 RNA is detected, but level present is <20 copies/mL   (<1.30 log copies/mL). This assay cannot accurately   quantify HIV-1 RNA below this level.    -------------------ADDITIONAL INFORMATION-------------------  The quantification range of this assay is 20 to 10,000,000   copies/mL (1.30 log to 7.00 log copies/mL). Testing was   performed using the alfonso HIV-1 test (Roche [employer]   [employer] with the alfonso InfoGin0 System.  This test has been modified from the 's   instructions. Its performance characteristics were   determined by [facility] in a manner consistent with CLIA   requirements. This test has not been cleared or approved by   the U.S. Food and Drug Administration.    Test  Performed by:  [facility] [place] [address]  : Vlad Dickey M.D. Ph.D.; CLIA# [id number]     12/02/2021 <20 (A) Undetected copies/mL Final     Comment:     Result in log copies/mL is <1.30.    HIV-1 RNA is detected, but level present is <20 copies/mL   (<1.30 log copies/mL). This assay cannot accurately   quantify HIV-1 RNA below this level.    -------------------ADDITIONAL INFORMATION-------------------  The quantification range of this assay is 20 to 10,000,000   copies/mL (1.30 log to 7.00 log copies/mL). Testing was   performed using the alfonso HIV-1 test (Roche [employer]   [employer] with the alfonso 6800 System.  This test has been modified from the 's   instructions. Its performance characteristics were   determined by [facility] in a manner consistent with CLIA   requirements. This test has not been cleared or approved by   the U.S. Food and Drug Administration.    Test Performed by:  [facility] [place] [address]  : Vlad Dickey M.D. Ph.D.; CLIA# [id number]     04/20/2021 23 (A) Undetected copies/mL Final     Comment:     Result in log copies/mL is 1.35.    -------------------ADDITIONAL INFORMATION-------------------  The quantification range of this assay is 20 to 10,000,000   copies/mL (1.30 log to 7.00 log copies/mL). Testing was   performed using the alfonso HIV-1 test (Roche [employer]   System[employer] with the alfonso 6800 System.  This test has been modified from the 's   instructions. Its performance characteristics were   determined by [facility] in a manner consistent with CLIA   requirements. This test has not been cleared or approved by   the U.S. Food and Drug Administration.    Test Performed by:  [place] [address]  Lab  Director: Vlad Dickey M.D. Ph.D.; IA# [id number]       HIV 1 RNA Ultra   Date Value Ref Range Status   03/06/2021 99,798 (H) <40 Copies/mL Final       Assessment:       1. Human immunodeficiency virus (HIV) disease :  Labs reviewed with the patient.  Viral load is less than 20.  CD4 count is 647.  Continue on biktarvy.   2. Essential hypertension :  Continue on lisinopril.   3. Immunization due :  Yellow fever and typhoid vaccines in preparation for his trip to [place].   4. Chronic allergic otitis media of right ear :  Will prescribe antihistamine.   5. Travel advice encounter :  Malarone for malaria prophylaxis.  Immunizations as above.         Plan:       Human immunodeficiency virus (HIV) disease    Essential hypertension  -     lisinopriL (PRINIVIL,ZESTRIL) 20 MG tablet; Take 1 tablet (20 mg total) by mouth once daily.  Dispense: 30 tablet; Refill: 11    Immunization due  -     Yellow Fever Vaccine (SQ); Future; Expected date: 04/29/2022  -     Typhoid Vaccine (ViCPs) (IM); Future; Expected date: 04/29/2022  -     (In Office Administered) Meningococcal Conjugate - MCV4O (MENVEO); Future; Expected date: 04/29/2022    Chronic allergic otitis media of right ear  -     cetirizine (ZYRTEC) 10 MG tablet; Take 1 tablet (10 mg total) by mouth once daily.  Dispense: 30 tablet; Refill: 2    Travel advice encounter  -     cetirizine (ZYRTEC) 10 MG tablet; Take 1 tablet (10 mg total) by mouth once daily.  Dispense: 30 tablet; Refill: 2  -     atovaquone-proguaniL (MALARONE) 250-100 mg Tab; Take one tablet daily for malaria prevention. Begin one day before entering malarious area and continue for 1 week after return.  Dispense: 25 tablet; Refill: 0  -     azithromycin (ZITHROMAX) 500 MG tablet; Take 1 tablet (500 mg total) by mouth once daily. Take one tablet once a day x 3 days in the event of diarrhea. for 3 days  Dispense: 3 tablet; Refill: 0

## 2022-04-29 NOTE — PROGRESS NOTES
Patient received Yellow fever vaccine in the left posterior arm, and the typhoid vaccine in the left deltoid. Pt tolerated well. Pt asked to wait in the clinic 15 minutes after injection in the event of an allergic reaction. Pt verbalized understanding. Pt left in NAD.

## 2022-05-04 ENCOUNTER — CLINICAL SUPPORT (OUTPATIENT)
Dept: INFECTIOUS DISEASES | Facility: CLINIC | Age: 42
End: 2022-05-04
Payer: COMMERCIAL

## 2022-05-04 DIAGNOSIS — Z23 IMMUNIZATION DUE: ICD-10-CM

## 2022-05-04 PROCEDURE — 90471 IMMUNIZATION ADMIN: CPT | Mod: S$GLB,,, | Performed by: INTERNAL MEDICINE

## 2022-05-04 PROCEDURE — 90471 MENINGOCOCCAL CONJUGATE VACCINE 4-VALENT IM (MENVEO): ICD-10-PCS | Mod: S$GLB,,, | Performed by: INTERNAL MEDICINE

## 2022-05-04 PROCEDURE — 99999 PR PBB SHADOW E&M-EST. PATIENT-LVL I: CPT | Mod: PBBFAC,,,

## 2022-05-04 PROCEDURE — 99999 PR PBB SHADOW E&M-EST. PATIENT-LVL I: ICD-10-PCS | Mod: PBBFAC,,,

## 2022-05-04 PROCEDURE — 90734 MENINGOCOCCAL CONJUGATE VACCINE 4-VALENT IM (MENVEO): ICD-10-PCS | Mod: S$GLB,,, | Performed by: INTERNAL MEDICINE

## 2022-05-04 PROCEDURE — 90734 MENACWYD/MENACWYCRM VACC IM: CPT | Mod: S$GLB,,, | Performed by: INTERNAL MEDICINE

## 2022-05-04 NOTE — PROGRESS NOTES
Patient received Menveo vaccine in the left deltoid. Pt tolerated well. Pt asked to wait in the clinic 15 minutes after injection in the event of an allergic reaction. Pt verbalized understanding. Pt left in NAD.

## 2022-05-16 ENCOUNTER — PATIENT MESSAGE (OUTPATIENT)
Dept: PHARMACY | Facility: CLINIC | Age: 42
End: 2022-05-16
Payer: COMMERCIAL

## 2022-05-25 ENCOUNTER — SPECIALTY PHARMACY (OUTPATIENT)
Dept: PHARMACY | Facility: CLINIC | Age: 42
End: 2022-05-25
Payer: COMMERCIAL

## 2022-05-25 ENCOUNTER — PATIENT MESSAGE (OUTPATIENT)
Dept: INFECTIOUS DISEASES | Facility: CLINIC | Age: 42
End: 2022-05-25
Payer: COMMERCIAL

## 2022-05-25 NOTE — TELEPHONE ENCOUNTER
Specialty Pharmacy - Refill Coordination    Specialty Medication Orders Linked to Encounter    Flowsheet Row Most Recent Value   Medication #1 ptwwxjdxd-ccvbsexi-iwuteis ala (BIKTARVY) -25 mg (25 kg or greater) (Order#[id number], Rx#[id number])          Refill Questions - Documented Responses    Flowsheet Row Most Recent Value   Patient Availability and HIPAA Verification    Does patient want to proceed with activity? Yes   HIPAA/medical authority confirmed? Yes   Relationship to patient of person spoken to? Self   Refill Screening Questions    Changes to allergies? No   Changes to medications? No   New conditions since last clinic visit? No   Unplanned office visit, urgent care, ED, or hospital admission in the last 4 weeks? No   How does patient/caregiver feel medication is working? Very good   Financial problems or insurance changes? No   How many doses of your specialty medications were missed in the last 4 weeks? 0   Would patient like to speak to a pharmacist? No   When does the patient need to receive the medication? 05/28/22   Refill Delivery Questions    How will the patient receive the medication? Pickup   When does the patient need to receive the medication? 05/28/22   Expected Copay ($) 0   Is the patient able to afford the medication copay? Yes   Payment Method zero copay   Days supply of Refill 30   Supplies needed? No supplies needed   Refill activity completed? Yes   Refill activity plan Refill scheduled   Shipment/Pickup Date: 05/26/22          Current Outpatient Medications   Medication Sig    atovaquone-proguaniL (MALARONE) 250-100 mg Tab Take one tablet daily for malaria prevention. Begin one day before entering malarious area and continue for 1 week after return.    sovjeurcr-jtvjetjx-wtvkfei ala (BIKTARVY) -25 mg (25 kg or greater) Take 1 tablet by mouth once daily.    cetirizine (ZYRTEC) 10 MG tablet Take 1 tablet (10 mg total) by mouth once daily.    fluticasone propionate  (FLONASE) 50 mcg/actuation nasal spray 2 sprays (100 mcg total) by Each Nostril route once daily.    lisinopriL (PRINIVIL,ZESTRIL) 20 MG tablet Take 1 tablet (20 mg total) by mouth once daily.   Last reviewed on 5/8/2022  9:10 PM by Beto Bragg MD    Review of patient's allergies indicates:  No Known Allergies Last reviewed on  5/8/2022 9:10 PM by Beto Bragg      Tasks added this encounter   6/20/2022 - Refill Call (Auto Added)  5/28/2022 - Pickup Reminder   Tasks due within next 3 months   No tasks due.     Lucina Ortiz, PharmD  [facility]  [address] [facility] [address]  Phone: [phone]  Fax: [phone]

## 2022-05-26 ENCOUNTER — TELEPHONE (OUTPATIENT)
Dept: INFECTIOUS DISEASES | Facility: CLINIC | Age: 42
End: 2022-05-26
Payer: COMMERCIAL

## 2022-05-26 DIAGNOSIS — H65.411 CHRONIC ALLERGIC OTITIS MEDIA OF RIGHT EAR: Primary | ICD-10-CM

## 2022-05-26 RX ORDER — PREDNISONE 20 MG/1
40 TABLET ORAL DAILY
Qty: 8 TABLET | Refills: 0 | Status: SHIPPED | OUTPATIENT
Start: 2022-05-26 | End: 2022-05-30

## 2022-05-26 NOTE — TELEPHONE ENCOUNTER
Patient continues to have sensation of dullness in his ears.  Not relieved by antihistamines.  Will give short trial of prednisone.

## 2022-06-09 ENCOUNTER — SPECIALTY PHARMACY (OUTPATIENT)
Dept: PHARMACY | Facility: CLINIC | Age: 42
End: 2022-06-09
Payer: COMMERCIAL

## 2022-06-09 DIAGNOSIS — B20 HUMAN IMMUNODEFICIENCY VIRUS (HIV) DISEASE: Primary | ICD-10-CM

## 2022-06-09 NOTE — TELEPHONE ENCOUNTER
Specialty Pharmacy - Clinical Reassessment    Specialty Medication Orders Linked to Encounter    Flowsheet Row Most Recent Value   Medication #1 jvyyazzzd-sgidrpax-herwcth ala (BIKTARVY) -25 mg (25 kg or greater) (Order#[id number], Rx#[id number])        Patient Diagnosis   B20 - Human immunodeficiency virus (HIV) disease    Specialty clinical pharmacist review completed for an annual review of reassessment. Reviewed the following areas: current med list, reports of adverse effects, adherence and progress towards therapeutic goals.    Recommendations: none at this time.    Tasks added this encounter   3/9/2023 - Clinical - Follow Up Assesement (Annual)   Tasks due within next 3 months   6/20/2022 - Refill Call (Auto Added)     Jenni Torres, PharmD  [facility]  [address] [place] [address]  Phone: [phone]  Fax: [phone]

## 2022-06-21 ENCOUNTER — PATIENT MESSAGE (OUTPATIENT)
Dept: PHARMACY | Facility: CLINIC | Age: 42
End: 2022-06-21
Payer: COMMERCIAL

## 2022-06-30 ENCOUNTER — SPECIALTY PHARMACY (OUTPATIENT)
Dept: PHARMACY | Facility: CLINIC | Age: 42
End: 2022-06-30
Payer: COMMERCIAL

## 2022-06-30 NOTE — TELEPHONE ENCOUNTER
Specialty Pharmacy - Refill Coordination    Specialty Medication Orders Linked to Encounter    Flowsheet Row Most Recent Value   Medication #1 qtcigxtmw-ncmzhoxt-bjsjywv ala (BIKTARVY) -25 mg (25 kg or greater) (Order#[id number], Rx#[id number])          Refill Questions - Documented Responses    Flowsheet Row Most Recent Value   Patient Availability and HIPAA Verification    Does patient want to proceed with activity? Yes   HIPAA/medical authority confirmed? Yes   Relationship to patient of person spoken to? Self   Refill Screening Questions    Changes to allergies? No   Changes to medications? No   New conditions since last clinic visit? No   Unplanned office visit, urgent care, ED, or hospital admission in the last 4 weeks? No   How does patient/caregiver feel medication is working? Very good   Financial problems or insurance changes? No   How many doses of your specialty medications were missed in the last 4 weeks? 0   Would patient like to speak to a pharmacist? No   When does the patient need to receive the medication? 07/01/22   Refill Delivery Questions    How will the patient receive the medication? Pickup   When does the patient need to receive the medication? 07/01/22   Expected Copay ($) 0   Is the patient able to afford the medication copay? Yes   Payment Method zero copay   Days supply of Refill 30   Supplies needed? No supplies needed   Refill activity completed? Yes   Refill activity plan Refill scheduled   Shipment/Pickup Date: 07/01/22          Current Outpatient Medications   Medication Sig    atovaquone-proguaniL (MALARONE) 250-100 mg Tab Take one tablet daily for malaria prevention. Begin one day before entering malarious area and continue for 1 week after return.    qddhocccp-nibqnvyu-nisiixj ala (BIKTARVY) -25 mg (25 kg or greater) Take 1 tablet by mouth once daily.    cetirizine (ZYRTEC) 10 MG tablet Take 1 tablet (10 mg total) by mouth once daily.    fluticasone propionate  (FLONASE) 50 mcg/actuation nasal spray 2 sprays (100 mcg total) by Each Nostril route once daily.    lisinopriL (PRINIVIL,ZESTRIL) 20 MG tablet Take 1 tablet (20 mg total) by mouth once daily.   Last reviewed on 5/26/2022 11:43 AM by Beto Bragg MD    Review of patient's allergies indicates:  No Known Allergies Last reviewed on  5/26/2022 11:44 AM by Beto Bragg      Tasks added this encounter   7/24/2022 - Refill Call (Auto Added)  7/2/2022 - Pickup Reminder   Tasks due within next 3 months   No tasks due.     Lucina Ortiz, PharmD  [facility]  [address] [facility] [address]  Phone: [phone]  Fax: [phone]

## 2022-07-26 ENCOUNTER — SPECIALTY PHARMACY (OUTPATIENT)
Dept: PHARMACY | Facility: CLINIC | Age: 42
End: 2022-07-26
Payer: COMMERCIAL

## 2022-08-01 ENCOUNTER — PATIENT MESSAGE (OUTPATIENT)
Dept: PHARMACY | Facility: CLINIC | Age: 42
End: 2022-08-01
Payer: COMMERCIAL

## 2022-08-04 ENCOUNTER — PATIENT MESSAGE (OUTPATIENT)
Dept: INFECTIOUS DISEASES | Facility: CLINIC | Age: 42
End: 2022-08-04
Payer: COMMERCIAL

## 2022-08-08 NOTE — TELEPHONE ENCOUNTER
Specialty Pharmacy - Refill Coordination    Specialty Medication Orders Linked to Encounter    Flowsheet Row Most Recent Value   Medication #1 bfbozqhkn-ocyufcyd-kjbjyqx ala (BIKTARVY) -25 mg (25 kg or greater) (Order#[id number], Rx#[id number])          Refill Questions - Documented Responses    Flowsheet Row Most Recent Value   Patient Availability and HIPAA Verification    Does patient want to proceed with activity? Yes   HIPAA/medical authority confirmed? Yes   Relationship to patient of person spoken to? Self   Refill Screening Questions    Changes to allergies? No   Changes to medications? No   New conditions since last clinic visit? No   Unplanned office visit, urgent care, ED, or hospital admission in the last 4 weeks? No   How does patient/caregiver feel medication is working? Very good   Financial problems or insurance changes? No   How many doses of your specialty medications were missed in the last 4 weeks? 1   Why were doses missed? Busy with other things  [Pt was traveling and states he may have missed 1 dose. OSP reviewed the importance of adherence to avoid resistance and to ensure optimal VL suppression.]   Would patient like to speak to a pharmacist? No   When does the patient need to receive the medication? 08/12/22   Refill Delivery Questions    How will the patient receive the medication? Pickup   When does the patient need to receive the medication? 08/12/22   Expected Copay ($) 0   Is the patient able to afford the medication copay? Yes   Payment Method zero copay   Days supply of Refill 30   Supplies needed? No supplies needed   Refill activity completed? Yes   Refill activity plan Refill scheduled   Shipment/Pickup Date: 08/11/22  [Pt will  either Thursday or Friday.]          Current Outpatient Medications   Medication Sig    atovaquone-proguaniL (MALARONE) 250-100 mg Tab Take one tablet daily for malaria prevention. Begin one day before entering malarious area and continue for  1 week after return.    nixfcmtuo-csbqgvkb-rjtahaq ala (BIKTARVY) -25 mg (25 kg or greater) Take 1 tablet by mouth once daily.    cetirizine (ZYRTEC) 10 MG tablet Take 1 tablet (10 mg total) by mouth once daily.    fluticasone propionate (FLONASE) 50 mcg/actuation nasal spray 2 sprays (100 mcg total) by Each Nostril route once daily.    lisinopriL (PRINIVIL,ZESTRIL) 20 MG tablet Take 1 tablet (20 mg total) by mouth once daily.   Last reviewed on 5/26/2022 11:43 AM by Beto Bragg MD    Review of patient's allergies indicates:  No Known Allergies Last reviewed on  5/26/2022 11:44 AM by Beto Bragg      Tasks added this encounter   9/4/2022 - Refill Call (Auto Added)  8/13/2022 - Pickup Reminder   Tasks due within next 3 months   No tasks due.     Lucina Ortiz, PharmD  [facility]  [address] [place] [id number]  Phone: [phone]  Fax: [phone]

## 2022-08-09 ENCOUNTER — IMMUNIZATION (OUTPATIENT)
Dept: INTERNAL MEDICINE | Facility: CLINIC | Age: 42
End: 2022-08-09
Payer: COMMERCIAL

## 2022-08-09 PROCEDURE — 90611 SMALLPOX MONKEYPOX VACCINE: ICD-10-PCS | Mod: S$GLB,,, | Performed by: INTERNAL MEDICINE

## 2022-08-09 PROCEDURE — 90471 IMMUNIZATION ADMIN: CPT | Mod: S$GLB,,, | Performed by: INTERNAL MEDICINE

## 2022-08-09 PROCEDURE — 90471 PR IMMUNIZ ADMIN,1 SINGLE/COMB VAC/TOXOID: ICD-10-PCS | Mod: S$GLB,,, | Performed by: INTERNAL MEDICINE

## 2022-08-09 PROCEDURE — 90611 SMALLPOX&MONKEYPOX VAC 0.5ML: CPT | Mod: S$GLB,,, | Performed by: INTERNAL MEDICINE

## 2022-08-11 DIAGNOSIS — H65.411 CHRONIC ALLERGIC OTITIS MEDIA OF RIGHT EAR: ICD-10-CM

## 2022-08-11 DIAGNOSIS — I10 ESSENTIAL HYPERTENSION: ICD-10-CM

## 2022-08-11 DIAGNOSIS — Z71.84 TRAVEL ADVICE ENCOUNTER: ICD-10-CM

## 2022-08-11 RX ORDER — CETIRIZINE HYDROCHLORIDE 10 MG/1
10 TABLET ORAL DAILY
Qty: 30 TABLET | Refills: 2 | Status: SHIPPED | OUTPATIENT
Start: 2022-08-11 | End: 2022-09-13 | Stop reason: SDUPTHER

## 2022-08-11 RX ORDER — LISINOPRIL 20 MG/1
20 TABLET ORAL DAILY
Qty: 30 TABLET | Refills: 11 | Status: SHIPPED | OUTPATIENT
Start: 2022-08-11 | End: 2022-09-13 | Stop reason: SDUPTHER

## 2022-08-12 NOTE — TELEPHONE ENCOUNTER
Called pt pharmacy and was told pt had plenty of refills and they were ready for . Spoke with pt and informed him that his Rx was ready. Pt verbally acknowledged and stated he will pick them up today.

## 2022-08-15 ENCOUNTER — TELEPHONE (OUTPATIENT)
Dept: INFECTIOUS DISEASES | Facility: CLINIC | Age: 42
End: 2022-08-15
Payer: COMMERCIAL

## 2022-08-15 DIAGNOSIS — H93.8X9 SENSATION OF FULLNESS IN EAR, UNSPECIFIED LATERALITY: Primary | ICD-10-CM

## 2022-09-06 ENCOUNTER — PATIENT MESSAGE (OUTPATIENT)
Dept: PHARMACY | Facility: CLINIC | Age: 42
End: 2022-09-06
Payer: COMMERCIAL

## 2022-09-06 ENCOUNTER — IMMUNIZATION (OUTPATIENT)
Dept: INTERNAL MEDICINE | Facility: CLINIC | Age: 42
End: 2022-09-06
Payer: COMMERCIAL

## 2022-09-06 PROCEDURE — 90611 SMALLPOX&MONKEYPOX VAC 0.5ML: CPT | Mod: S$GLB,,, | Performed by: INTERNAL MEDICINE

## 2022-09-06 PROCEDURE — 90471 PR IMMUNIZ ADMIN,1 SINGLE/COMB VAC/TOXOID: ICD-10-PCS | Mod: S$GLB,,, | Performed by: INTERNAL MEDICINE

## 2022-09-06 PROCEDURE — 90611 SMALLPOX MONKEYPOX VACCINE: ICD-10-PCS | Mod: S$GLB,,, | Performed by: INTERNAL MEDICINE

## 2022-09-06 PROCEDURE — 90471 IMMUNIZATION ADMIN: CPT | Mod: S$GLB,,, | Performed by: INTERNAL MEDICINE

## 2022-09-13 ENCOUNTER — SPECIALTY PHARMACY (OUTPATIENT)
Dept: PHARMACY | Facility: CLINIC | Age: 42
End: 2022-09-13
Payer: COMMERCIAL

## 2022-09-13 NOTE — TELEPHONE ENCOUNTER
Specialty Pharmacy - Refill Coordination    Specialty Medication Orders Linked to Encounter      Flowsheet Row Most Recent Value   Medication #1 srfpnwcqc-rlubndha-qpjvqka ala (BIKTARVY) -25 mg (25 kg or greater) (Order#[id number], Rx#[id number])            Refill Questions - Documented Responses      Flowsheet Row Most Recent Value   Patient Availability and HIPAA Verification    Does patient want to proceed with activity? Yes   HIPAA/medical authority confirmed? Yes   Relationship to patient of person spoken to? Self   Refill Screening Questions    Changes to allergies? No   Changes to medications? No   New conditions since last clinic visit? No   Unplanned office visit, urgent care, ED, or hospital admission in the last 4 weeks? No   How does patient/caregiver feel medication is working? Good   Financial problems or insurance changes? No   How many doses of your specialty medications were missed in the last 4 weeks? 0   Would patient like to speak to a pharmacist? No   When does the patient need to receive the medication? 09/16/22   Refill Delivery Questions    How will the patient receive the medication? Pickup   When does the patient need to receive the medication? 09/16/22   Expected Copay ($) 0   Is the patient able to afford the medication copay? Yes   Payment Method zero copay   Days supply of Refill 30   Supplies needed? No supplies needed   Refill activity completed? Yes   Refill activity plan Refill scheduled   Shipment/Pickup Date: 09/16/22            Current Outpatient Medications   Medication Sig    atovaquone-proguaniL (MALARONE) 250-100 mg Tab Take one tablet daily for malaria prevention. Begin one day before entering malarious area and continue for 1 week after return.    xmimwsncg-suntqvdn-tzdldla ala (BIKTARVY) -25 mg (25 kg or greater) Take 1 tablet by mouth once daily.    cetirizine (ZYRTEC) 10 MG tablet Take 1 tablet (10 mg total) by mouth once daily.    fluticasone propionate  (FLONASE) 50 mcg/actuation nasal spray 2 sprays (100 mcg total) by Each Nostril route once daily.    lisinopriL (PRINIVIL,ZESTRIL) 20 MG tablet Take 1 tablet (20 mg total) by mouth once daily.   Last reviewed on 5/26/2022 11:43 AM by Beto Bragg MD    Review of patient's allergies indicates:  No Known Allergies Last reviewed on  5/26/2022 11:44 AM by Beto Bragg      Tasks added this encounter   10/9/2022 - Refill Call (Auto Added)  9/17/2022 - Pickup Reminder   Tasks due within next 3 months   No tasks due.     Uma Flores, PharmD  [facility]  [address] [facility] [address]  Phone: [phone]  Fax: [phone]

## 2022-10-07 ENCOUNTER — OFFICE VISIT (OUTPATIENT)
Dept: URGENT CARE | Facility: CLINIC | Age: 42
End: 2022-10-07
Payer: COMMERCIAL

## 2022-10-07 VITALS
DIASTOLIC BLOOD PRESSURE: 85 MMHG | TEMPERATURE: 97 F | HEIGHT: 71 IN | HEART RATE: 73 BPM | OXYGEN SATURATION: 97 % | BODY MASS INDEX: 37.8 KG/M2 | RESPIRATION RATE: 16 BRPM | WEIGHT: 270 LBS | SYSTOLIC BLOOD PRESSURE: 139 MMHG

## 2022-10-07 DIAGNOSIS — J02.0 STREP PHARYNGITIS: Primary | ICD-10-CM

## 2022-10-07 DIAGNOSIS — J02.9 SORE THROAT: ICD-10-CM

## 2022-10-07 LAB
CTP QC/QA: YES
MOLECULAR STREP A: POSITIVE

## 2022-10-07 PROCEDURE — 3079F DIAST BP 80-89 MM HG: CPT | Mod: CPTII,S$GLB,, | Performed by: NURSE PRACTITIONER

## 2022-10-07 PROCEDURE — 1160F RVW MEDS BY RX/DR IN RCRD: CPT | Mod: CPTII,S$GLB,, | Performed by: NURSE PRACTITIONER

## 2022-10-07 PROCEDURE — 4010F ACE/ARB THERAPY RXD/TAKEN: CPT | Mod: CPTII,S$GLB,, | Performed by: NURSE PRACTITIONER

## 2022-10-07 PROCEDURE — 4010F PR ACE/ARB THEARPY RXD/TAKEN: ICD-10-PCS | Mod: CPTII,S$GLB,, | Performed by: NURSE PRACTITIONER

## 2022-10-07 PROCEDURE — 1160F PR REVIEW ALL MEDS BY PRESCRIBER/CLIN PHARMACIST DOCUMENTED: ICD-10-PCS | Mod: CPTII,S$GLB,, | Performed by: NURSE PRACTITIONER

## 2022-10-07 PROCEDURE — 3008F PR BODY MASS INDEX (BMI) DOCUMENTED: ICD-10-PCS | Mod: CPTII,S$GLB,, | Performed by: NURSE PRACTITIONER

## 2022-10-07 PROCEDURE — 3075F PR MOST RECENT SYSTOLIC BLOOD PRESS GE 130-139MM HG: ICD-10-PCS | Mod: CPTII,S$GLB,, | Performed by: NURSE PRACTITIONER

## 2022-10-07 PROCEDURE — 99213 OFFICE O/P EST LOW 20 MIN: CPT | Mod: S$GLB,,, | Performed by: NURSE PRACTITIONER

## 2022-10-07 PROCEDURE — 1159F PR MEDICATION LIST DOCUMENTED IN MEDICAL RECORD: ICD-10-PCS | Mod: CPTII,S$GLB,, | Performed by: NURSE PRACTITIONER

## 2022-10-07 PROCEDURE — 3075F SYST BP GE 130 - 139MM HG: CPT | Mod: CPTII,S$GLB,, | Performed by: NURSE PRACTITIONER

## 2022-10-07 PROCEDURE — 3079F PR MOST RECENT DIASTOLIC BLOOD PRESSURE 80-89 MM HG: ICD-10-PCS | Mod: CPTII,S$GLB,, | Performed by: NURSE PRACTITIONER

## 2022-10-07 PROCEDURE — 87651 STREP A DNA AMP PROBE: CPT | Mod: QW,S$GLB,, | Performed by: NURSE PRACTITIONER

## 2022-10-07 PROCEDURE — 1159F MED LIST DOCD IN RCRD: CPT | Mod: CPTII,S$GLB,, | Performed by: NURSE PRACTITIONER

## 2022-10-07 PROCEDURE — 87651 POCT STREP A MOLECULAR: ICD-10-PCS | Mod: QW,S$GLB,, | Performed by: NURSE PRACTITIONER

## 2022-10-07 PROCEDURE — 3008F BODY MASS INDEX DOCD: CPT | Mod: CPTII,S$GLB,, | Performed by: NURSE PRACTITIONER

## 2022-10-07 PROCEDURE — 99213 PR OFFICE/OUTPT VISIT, EST, LEVL III, 20-29 MIN: ICD-10-PCS | Mod: S$GLB,,, | Performed by: NURSE PRACTITIONER

## 2022-10-07 RX ORDER — AMOXICILLIN 500 MG/1
500 TABLET, FILM COATED ORAL EVERY 12 HOURS
Qty: 20 TABLET | Refills: 0 | Status: SHIPPED | OUTPATIENT
Start: 2022-10-07 | End: 2022-10-18 | Stop reason: ALTCHOICE

## 2022-10-07 NOTE — PROGRESS NOTES
"Subjective:       Patient ID: Francisco May II is a 42 y.o. male.    Vitals:  height is 5' 11" (1.803 m) and weight is 122.5 kg (270 lb). His oral temperature is 97 °F (36.1 °C). His blood pressure is 139/85 and his pulse is 73. His respiration is 16 and oxygen saturation is 97%.     Chief Complaint: Sore Throat (Sore throat for the past 4 days. Seemingly getting worse and not better. - Entered by patient)    Patient stated of having a sore throat.  Symptom occurred 4 days ago.  Patient is Covid vaccinated.    Sore Throat   This is a new problem. The current episode started in the past 7 days. The problem has been unchanged. There has been no fever. The pain is at a severity of 7/10. The pain is moderate. Associated symptoms include congestion, ear pain and trouble swallowing. Pertinent negatives include no abdominal pain, coughing, diarrhea, drooling, ear discharge, headaches, hoarse voice, plugged ear sensation, neck pain, shortness of breath, stridor, swollen glands or vomiting. Treatments tried: Ibuprofen. The treatment provided mild relief.     HENT:  Positive for ear pain, congestion, sore throat and trouble swallowing. Negative for ear discharge and drooling.    Neck: Negative for neck pain.   Respiratory:  Negative for cough, shortness of breath and stridor.    Gastrointestinal:  Negative for abdominal pain, vomiting and diarrhea.   Neurological:  Negative for headaches.     Objective:      Physical Exam   Constitutional: He is oriented to person, place, and time.   HENT:   Head: Normocephalic and atraumatic.   Mouth/Throat: Posterior oropharyngeal erythema present. No oropharyngeal exudate.   Cardiovascular: Normal rate.   Pulmonary/Chest: Effort normal. No respiratory distress.   Abdominal: Normal appearance.   Neurological: He is alert and oriented to person, place, and time.   Skin: Skin is dry.   Psychiatric: His behavior is normal. Mood normal.         Results for orders placed or performed " in visit on 10/07/22   POCT Strep A, Molecular   Result Value Ref Range    Molecular Strep A, POC Positive (A) Negative     Acceptable Yes       Assessment:       1. Strep pharyngitis    2. Sore throat          Plan:       If symptoms do not improve within 2-3 days of antibiotic therapy return to clinic. If you begin to have difficulty swallowing, pain more so on one side of throat, muffled voice or drooling go to ER immediately. Complete full course of antibiotic therapy.    Replace toothbrush and CPAP muothpiece        Strep pharyngitis  -     amoxicillin (AMOXIL) 500 MG Tab; Take 1 tablet (500 mg total) by mouth every 12 (twelve) hours. for 10 days  Dispense: 20 tablet; Refill: 0    Sore throat  -     POCT Strep A, Molecular

## 2022-10-11 ENCOUNTER — SPECIALTY PHARMACY (OUTPATIENT)
Dept: PHARMACY | Facility: CLINIC | Age: 42
End: 2022-10-11
Payer: COMMERCIAL

## 2022-10-11 NOTE — TELEPHONE ENCOUNTER
10/11 [facility]  refill attempt, pt requested a call back on 10/18 to schedule.pt reports he is not ready at this time.

## 2022-10-12 ENCOUNTER — CLINICAL SUPPORT (OUTPATIENT)
Dept: AUDIOLOGY | Facility: CLINIC | Age: 42
End: 2022-10-12
Payer: COMMERCIAL

## 2022-10-12 ENCOUNTER — OFFICE VISIT (OUTPATIENT)
Dept: OTOLARYNGOLOGY | Facility: CLINIC | Age: 42
End: 2022-10-12
Payer: COMMERCIAL

## 2022-10-12 VITALS — DIASTOLIC BLOOD PRESSURE: 81 MMHG | HEART RATE: 80 BPM | SYSTOLIC BLOOD PRESSURE: 137 MMHG

## 2022-10-12 DIAGNOSIS — H93.8X1 SENSATION OF FULLNESS IN RIGHT EAR: Primary | ICD-10-CM

## 2022-10-12 DIAGNOSIS — H90.0 CONDUCTIVE HEARING LOSS, BILATERAL: ICD-10-CM

## 2022-10-12 DIAGNOSIS — H66.91 CHRONIC OTITIS MEDIA OF RIGHT EAR: ICD-10-CM

## 2022-10-12 DIAGNOSIS — H69.93 ETD (EUSTACHIAN TUBE DYSFUNCTION), BILATERAL: ICD-10-CM

## 2022-10-12 DIAGNOSIS — H90.A11 CONDUCTIVE HEARING LOSS OF RIGHT EAR WITH RESTRICTED HEARING OF LEFT EAR: Primary | ICD-10-CM

## 2022-10-12 DIAGNOSIS — H93.13 TINNITUS, BILATERAL: ICD-10-CM

## 2022-10-12 DIAGNOSIS — G47.33 OBSTRUCTIVE SLEEP APNEA: ICD-10-CM

## 2022-10-12 DIAGNOSIS — E66.01 CLASS 2 SEVERE OBESITY DUE TO EXCESS CALORIES WITH SERIOUS COMORBIDITY AND BODY MASS INDEX (BMI) OF 37.0 TO 37.9 IN ADULT: ICD-10-CM

## 2022-10-12 PROCEDURE — 99214 OFFICE O/P EST MOD 30 MIN: CPT | Mod: S$GLB,,, | Performed by: OTOLARYNGOLOGY

## 2022-10-12 PROCEDURE — 1160F RVW MEDS BY RX/DR IN RCRD: CPT | Mod: CPTII,S$GLB,, | Performed by: OTOLARYNGOLOGY

## 2022-10-12 PROCEDURE — 1160F PR REVIEW ALL MEDS BY PRESCRIBER/CLIN PHARMACIST DOCUMENTED: ICD-10-PCS | Mod: CPTII,S$GLB,, | Performed by: OTOLARYNGOLOGY

## 2022-10-12 PROCEDURE — 1159F PR MEDICATION LIST DOCUMENTED IN MEDICAL RECORD: ICD-10-PCS | Mod: CPTII,S$GLB,, | Performed by: OTOLARYNGOLOGY

## 2022-10-12 PROCEDURE — 99999 PR PBB SHADOW E&M-EST. PATIENT-LVL II: ICD-10-PCS | Mod: PBBFAC,,,

## 2022-10-12 PROCEDURE — 3079F PR MOST RECENT DIASTOLIC BLOOD PRESSURE 80-89 MM HG: ICD-10-PCS | Mod: CPTII,S$GLB,, | Performed by: OTOLARYNGOLOGY

## 2022-10-12 PROCEDURE — 99999 PR PBB SHADOW E&M-EST. PATIENT-LVL IV: CPT | Mod: PBBFAC,,, | Performed by: OTOLARYNGOLOGY

## 2022-10-12 PROCEDURE — 92567 PR TYMPA2METRY: ICD-10-PCS | Mod: S$GLB,,,

## 2022-10-12 PROCEDURE — 1159F MED LIST DOCD IN RCRD: CPT | Mod: CPTII,S$GLB,, | Performed by: OTOLARYNGOLOGY

## 2022-10-12 PROCEDURE — 3075F SYST BP GE 130 - 139MM HG: CPT | Mod: CPTII,S$GLB,, | Performed by: OTOLARYNGOLOGY

## 2022-10-12 PROCEDURE — 99999 PR PBB SHADOW E&M-EST. PATIENT-LVL II: CPT | Mod: PBBFAC,,,

## 2022-10-12 PROCEDURE — 92567 TYMPANOMETRY: CPT | Mod: S$GLB,,,

## 2022-10-12 PROCEDURE — 99214 PR OFFICE/OUTPT VISIT, EST, LEVL IV, 30-39 MIN: ICD-10-PCS | Mod: S$GLB,,, | Performed by: OTOLARYNGOLOGY

## 2022-10-12 PROCEDURE — 92557 PR COMPREHENSIVE HEARING TEST: ICD-10-PCS | Mod: S$GLB,,,

## 2022-10-12 PROCEDURE — 3079F DIAST BP 80-89 MM HG: CPT | Mod: CPTII,S$GLB,, | Performed by: OTOLARYNGOLOGY

## 2022-10-12 PROCEDURE — 92557 COMPREHENSIVE HEARING TEST: CPT | Mod: S$GLB,,,

## 2022-10-12 PROCEDURE — 3075F PR MOST RECENT SYSTOLIC BLOOD PRESS GE 130-139MM HG: ICD-10-PCS | Mod: CPTII,S$GLB,, | Performed by: OTOLARYNGOLOGY

## 2022-10-12 PROCEDURE — 4010F PR ACE/ARB THEARPY RXD/TAKEN: ICD-10-PCS | Mod: CPTII,S$GLB,, | Performed by: OTOLARYNGOLOGY

## 2022-10-12 PROCEDURE — 99999 PR PBB SHADOW E&M-EST. PATIENT-LVL IV: ICD-10-PCS | Mod: PBBFAC,,, | Performed by: OTOLARYNGOLOGY

## 2022-10-12 PROCEDURE — 4010F ACE/ARB THERAPY RXD/TAKEN: CPT | Mod: CPTII,S$GLB,, | Performed by: OTOLARYNGOLOGY

## 2022-10-12 RX ORDER — PREDNISONE 5 MG/1
TABLET ORAL
Qty: 21 TABLET | Refills: 0 | Status: SHIPPED | OUTPATIENT
Start: 2022-10-12 | End: 2022-10-18

## 2022-10-12 RX ORDER — FLUTICASONE PROPIONATE 50 MCG
2 SPRAY, SUSPENSION (ML) NASAL DAILY
Qty: 16 G | Refills: 2 | Status: SHIPPED | OUTPATIENT
Start: 2022-10-12 | End: 2022-11-28 | Stop reason: SDUPTHER

## 2022-10-12 NOTE — PROGRESS NOTES
41 y/o male referrred by Dr. Bragg for evaluation of ear fullness. Patient with recurring problem of ear fullness and fluid. Recalls problem 1st started in about 2010 oo 2012. Saw an ENT in community - drained ear in clinic (hurt a lot) then placed tubes in both ears at surgery center. Tubes stayed in for years and did ok. About 4-5 yrs ago problem started again after tubes came out. 3-4 months a year the right ear hearing and sometimes bilateral hearing will be reduced due to fluid. Does not recall childhood OM issues. Saw Dr. Ospina in the past. Late last year saw Dr. Phillip - had some fluid. Prednisone course and Augmentin improved issue until June 2022 when developed URI - ear got stuck again.  Tried some left over prednisone a month or two ago w/o relief. Started Amoxicillin for strep 5 days ago.     Has VITOR. Admits hard to lose weight. Has tried to exercise in past but admits over COVID time gained weight.  Uses CPAP. Does not use humidifier.  Awakes with lots of mucus. Nose congested often.     Has been taking Zyrtec daily. Not a lot of sneezing or itching. Not sure how much zyrtec helps.    Frustrated by hearing. Notices more when in meetings and with others. No air flight ear pain.    PMHx, PSHx, Meds, Allergies, SocHx, FamHx reviewed in EPIC    ROS:  Gen - no f/c  ENT - as above    PE: /81   Pulse 80   Weight 122.5 kg  BMI 37.66  Gen: male, well nourished, well developed, NAD, cooperative, good historian, obese, talkative and polite  Ears:  EAC clear & R TM with thomas translucent RONY with minimal air anterior and superiorly, small 1-2 mm retraction in attic (does not move with insufflation); Left TM with mild retraction and pinpoint attic retraction with normal bony landmarks bilaterally  Nose: external nose wnl, nasal septum near midline anteriorly, inferior turbinates mild edema, clear mucus, no visible purulence or polyps  OC/OP:  MMM, thick tongue protrudes midline, palate raises symmetrically,  bifid uvula, tonsils 2+ cryptic, no erythema or exudate  Neck: thick, supple, no TTP, no LAD or masses  Face:  no TTP, no erythema or flushing  Respiratory: Breathing comfortably without retractions  Skin: facial skin intact without visible lesions or flushing  Lymph: no neck lympadenopathy  Neuro:  facial movement symmetric, speech fluid, gait stable, tongue protrudes midline  Psych: alert & oriented x 3, reasonable, normal affect    Impression:   1. Sensation of fullness in right ear  Ambulatory referral/consult to ENT    predniSONE (DELTASONE) 5 MG tablet      2. ETD (Eustachian tube dysfunction), bilateral  predniSONE (DELTASONE) 5 MG tablet    fluticasone propionate (FLONASE) 50 mcg/actuation nasal spray      3. Chronic otitis media of right ear  predniSONE (DELTASONE) 5 MG tablet    fluticasone propionate (FLONASE) 50 mcg/actuation nasal spray      4. Conductive hearing loss, bilateral - right > left        5. Class 2 severe obesity due to excess calories with serious comorbidity and body mass index (BMI) of 37.0 to 37.9 in adult  Ambulatory referral/consult to Nutrition Services      6. Obstructive sleep apnea  Ambulatory referral/consult to Nutrition Services          Discussion and Plan:       Reviewed history, findings, and function of eustachian tube and potential cause of his chronic middle ear fluid.    Patient has responded in the past to oral steroids. Six day course of prednisone ordered. Risks and benefits discussed. Side effects discussed.    Complete amoxicillin for strep throat.    Flonase 2 sprays each nostril daily. We discussed how to apply it appropriately by placing the spray nozzle inside the nostril pointing above the ear on the same side and gently breathing in through the nose (do NOT snort).    For mucus build up:  Use humidifier on CPAP. Use saline nasal spray in morning and evening.    Discussed weight management. Nutrition consult ordered.     Follow up in about 4 weeks.    Parts or  all of this note were created by voice recognition software; typographical errors in translating may be present.    Noted when reviewing this note that I had not included the fact that the audiogram from the same date was also reviewed with the patient.

## 2022-10-12 NOTE — PROGRESS NOTES
Francisco May II was seen today in the clinic for an audiologic evaluation.  Patient's main complaint was right sided aural fullness.  Mr. May reported a long standing history of aural fullness that has been treated with tubes and several types of antibiotics. He noted short term relief from these treatment methods but his aural fullness has never completely resolved. Mr. May also reported intermittent tinnitus. He denied vertigo.    Tympanometry revealed Type B with normal ECV in the right ear and Type A in the left ear.     Audiogram results revealed moderately severe conductive hearing loss in the right ear and mild low frequency conductive hearing loss in the left ear.      Speech reception thresholds were noted at 40 dB in the right ear and 25 dB in the left ear.    Speech discrimination scores were 100% in the right ear and 100% in the left ear.    Recommendations:  Otologic evaluation  Annual audiogram  Hearing protection when in noise  Hearing aid consultation

## 2022-10-12 NOTE — PATIENT INSTRUCTIONS
Reviewed history, findings and function of eustachian tube and potential cause of his chronic middle ear fluid.    Patient has responded in the past to oral steroids. Six day course of prednisone ordered. Risks and benefits discussed. Side effects discussed.    Complete amoxicillin for strep throat.    Flonase 2 sprays each nostril daily. We discussed how to apply it appropriately by placing the spray nozzle inside the nostril pointing above the ear on the same side and gently breathing in through the nose (do NOT snort).    For mucus build up:  Use humidifier on CPAP. Use saline nasal spray in morning and evening.    Discussed weight management. Nutrition consult ordered.     Follow up in about 4 weeks.

## 2022-10-18 NOTE — TELEPHONE ENCOUNTER
Specialty Pharmacy - Refill Coordination    Specialty Medication Orders Linked to Encounter      Flowsheet Row Most Recent Value   Medication #1 hmtfrxbmg-fursazob-mpjuujo ala (BIKTARVY) -25 mg (25 kg or greater) (Order#[id number], Rx#[id number])            Refill Questions - Documented Responses      Flowsheet Row Most Recent Value   Patient Availability and HIPAA Verification    Does patient want to proceed with activity? Yes   HIPAA/medical authority confirmed? Yes   Relationship to patient of person spoken to? Self   Refill Screening Questions    Changes to allergies? No   Changes to medications? Yes  [Prednisone and Amoxil for strep throat/ear infection. Finished treatment course. No DDIs.]   New conditions since last clinic visit? No   Unplanned office visit, urgent care, ED, or hospital admission in the last 4 weeks? Yes  [Strep throat/ear infection.]   How does patient/caregiver feel medication is working? Very good   Financial problems or insurance changes? No   How many doses of your specialty medications were missed in the last 4 weeks? 0   Would patient like to speak to a pharmacist? No   When does the patient need to receive the medication? 10/24/22   Refill Delivery Questions    How will the patient receive the medication? Pickup   When does the patient need to receive the medication? 10/24/22   Expected Copay ($) 0   Is the patient able to afford the medication copay? Yes   Payment Method zero copay   Days supply of Refill 30   Supplies needed? No supplies needed   Refill activity completed? Yes   Refill activity plan Refill scheduled   Shipment/Pickup Date: 10/21/22            Current Outpatient Medications   Medication Sig    meufnapfy-jvjewkzf-ymczfub ala (BIKTARVY) -25 mg (25 kg or greater) Take 1 tablet by mouth once daily.    cetirizine (ZYRTEC) 10 MG tablet Take 1 tablet (10 mg total) by mouth once daily.    fluticasone propionate (FLONASE) 50 mcg/actuation nasal spray 2 sprays (100  mcg total) by Each Nostril route once daily.    lisinopriL (PRINIVIL,ZESTRIL) 20 MG tablet Take 1 tablet (20 mg total) by mouth once daily.    predniSONE (DELTASONE) 5 MG tablet Take 6 tablets (30 mg total) by mouth once daily for 1 day, THEN 5 tablets (25 mg total) once daily for 1 day, THEN 4 tablets (20 mg total) once daily for 1 day, THEN 3 tablets (15 mg total) once daily for 1 day, THEN 2 tablets (10 mg total) once daily for 1 day, THEN 1 tablet (5 mg total) once daily for 1 day.   Last reviewed on 10/18/2022  9:53 AM by Lucina Ortiz PharmD    Review of patient's allergies indicates:  No Known Allergies Last reviewed on  10/12/2022 3:18 PM by Jennifer Cuevas      Tasks added this encounter   11/16/2022 - Refill Call (Auto Added)  10/22/2022 - Pickup Reminder   Tasks due within next 3 months   No tasks due.     Hosea RodríguezD  [facility]  [address] [place] [id number]  Phone: [phone]  Fax: [phone]

## 2022-11-09 ENCOUNTER — OFFICE VISIT (OUTPATIENT)
Dept: OTOLARYNGOLOGY | Facility: CLINIC | Age: 42
End: 2022-11-09
Payer: COMMERCIAL

## 2022-11-09 VITALS — SYSTOLIC BLOOD PRESSURE: 141 MMHG | DIASTOLIC BLOOD PRESSURE: 80 MMHG | HEART RATE: 80 BPM

## 2022-11-09 DIAGNOSIS — H90.0 CONDUCTIVE HEARING LOSS, BILATERAL: ICD-10-CM

## 2022-11-09 DIAGNOSIS — H69.93 ETD (EUSTACHIAN TUBE DYSFUNCTION), BILATERAL: ICD-10-CM

## 2022-11-09 DIAGNOSIS — H65.491 CHRONIC OTITIS MEDIA WITH EFFUSION, RIGHT: ICD-10-CM

## 2022-11-09 DIAGNOSIS — H73.893 TYMPANIC MEMBRANE RETRACTION, BILATERAL: ICD-10-CM

## 2022-11-09 DIAGNOSIS — H93.8X1 SENSATION OF FULLNESS IN RIGHT EAR: Primary | ICD-10-CM

## 2022-11-09 PROCEDURE — 99999 PR PBB SHADOW E&M-EST. PATIENT-LVL III: ICD-10-PCS | Mod: PBBFAC,,, | Performed by: OTOLARYNGOLOGY

## 2022-11-09 PROCEDURE — 1160F RVW MEDS BY RX/DR IN RCRD: CPT | Mod: CPTII,S$GLB,, | Performed by: OTOLARYNGOLOGY

## 2022-11-09 PROCEDURE — 99213 OFFICE O/P EST LOW 20 MIN: CPT | Mod: S$GLB,,, | Performed by: OTOLARYNGOLOGY

## 2022-11-09 PROCEDURE — 1159F MED LIST DOCD IN RCRD: CPT | Mod: CPTII,S$GLB,, | Performed by: OTOLARYNGOLOGY

## 2022-11-09 PROCEDURE — 3077F SYST BP >= 140 MM HG: CPT | Mod: CPTII,S$GLB,, | Performed by: OTOLARYNGOLOGY

## 2022-11-09 PROCEDURE — 4010F PR ACE/ARB THEARPY RXD/TAKEN: ICD-10-PCS | Mod: CPTII,S$GLB,, | Performed by: OTOLARYNGOLOGY

## 2022-11-09 PROCEDURE — 99213 PR OFFICE/OUTPT VISIT, EST, LEVL III, 20-29 MIN: ICD-10-PCS | Mod: S$GLB,,, | Performed by: OTOLARYNGOLOGY

## 2022-11-09 PROCEDURE — 4010F ACE/ARB THERAPY RXD/TAKEN: CPT | Mod: CPTII,S$GLB,, | Performed by: OTOLARYNGOLOGY

## 2022-11-09 PROCEDURE — 99999 PR PBB SHADOW E&M-EST. PATIENT-LVL III: CPT | Mod: PBBFAC,,, | Performed by: OTOLARYNGOLOGY

## 2022-11-09 PROCEDURE — 1160F PR REVIEW ALL MEDS BY PRESCRIBER/CLIN PHARMACIST DOCUMENTED: ICD-10-PCS | Mod: CPTII,S$GLB,, | Performed by: OTOLARYNGOLOGY

## 2022-11-09 PROCEDURE — 1159F PR MEDICATION LIST DOCUMENTED IN MEDICAL RECORD: ICD-10-PCS | Mod: CPTII,S$GLB,, | Performed by: OTOLARYNGOLOGY

## 2022-11-09 PROCEDURE — 3077F PR MOST RECENT SYSTOLIC BLOOD PRESSURE >= 140 MM HG: ICD-10-PCS | Mod: CPTII,S$GLB,, | Performed by: OTOLARYNGOLOGY

## 2022-11-09 PROCEDURE — 3079F DIAST BP 80-89 MM HG: CPT | Mod: CPTII,S$GLB,, | Performed by: OTOLARYNGOLOGY

## 2022-11-09 PROCEDURE — 3079F PR MOST RECENT DIASTOLIC BLOOD PRESSURE 80-89 MM HG: ICD-10-PCS | Mod: CPTII,S$GLB,, | Performed by: OTOLARYNGOLOGY

## 2022-11-09 NOTE — PATIENT INSTRUCTIONS
Discussed options with patient for treatment. Patient interested in tympanostomy tube placement. Patient has seen Dr. Phillip (cc'd on today's note) in the past and will send back to him for consideration of tube placement. Patient with history of tympanostomy tube placement in the past under anesthesia. Expectations and follow up discussed.

## 2022-11-09 NOTE — Clinical Note
Dr. Phillip, This patient saw you last year. Interested in tympanostomy tube placement now after 4-5 months of persistent R RONY. Also has attic retractions B present (you noted them last year as well).  Had prior tubes placed in OR but [facility]. May I have your staff make him an appt. He returns to [facility] on 11/20 and he said he would make himself available whenever you have time. Thank you. Jennifer Cuevas M.D.

## 2022-11-09 NOTE — PROGRESS NOTES
"43 y/o male presents for follow up of ear fullness and RONY on right. Treated with amoxil and steroids which had helped in the past. The patient completed them and has cont'd with antihistamine and Flonase w/o any improvement. R ear feels full. No pain. No d/c. Frustrated with hearing currently.  Current ear fullness started in July 2022.    Leaving for work trip out of country tomorrow - will return 11/20.      HPI from 10/12/22 visit:  "43 y/o male referrred by Dr. Bragg for evaluation of ear fullness. Patient with recurring problem of ear fullness and fluid. Recalls problem 1st started in about 2010 or 2012. Saw an ENT in [facility] - drained ear in clinic (hurt a lot) then placed tubes in both ears at surgery center. Tubes stayed in for years and did ok. About 4-5 yrs ago problem started again after tubes came out. 3-4 months a year the right ear hearing and sometimes bilateral hearing will be reduced due to fluid. Does not recall childhood OM issues. Saw Dr. Ospina in the past. Late last year saw Dr. Phillip - had some fluid. Prednisone course and Augmentin improved issue until June 2022 when developed URI - ear got stuck again.  Tried some left over prednisone a month or two ago w/o relief. Started Amoxicillin for strep 5 days ago. "    PMHx, PSHx, Meds, Allergies, SocHx, FamHx reviewed in Epic. PMHx sig for VITOR & HTN plus obesity.    ROS:  Gen: no f/c  ENT: as above    PE: BP (!) 141/80   Pulse 80    Gen: male, well nourished, well developed, NAD, cooperative, good historian, obese, talkative and polite  Ears:  EAC clear & R TM with thomas translucent RONY with minimal air anterior and superiorly, small 1-2 mm retraction in attic (does not move with insufflation); Left TM with mild retraction and pinpoint attic retraction with normal bony landmarks bilaterally; microscopic exam w/ video.  Respiratory: Breathing comfortably without retractions  Skin: facial skin intact without visible lesions or flushing  Lymph: " no neck lympadenopathy  Neuro:  facial movement symmetric, speech fluid, gait stable  Psych: alert & oriented x 3, reasonable, normal affect      Reviewed w/ pt at last visit. CHL B R > L. Flat tymp R.    Impression:   1. Sensation of fullness in right ear        2. ETD (Eustachian tube dysfunction), bilateral        3. Chronic otitis media with effusion, right        4. Tympanic membrane retraction, bilateral (attic - small)        5. Conductive hearing loss, bilateral - right > left            Discussion and Plan:       Discussed options with patient for treatment. Patient interested in tympanostomy tube placement. Patient has seen Dr. Phillip (cc'd on today's note) in the past and will send back to him for consideration of tube placement. Patient with history of tympanostomy tube placement in the past under anesthesia. Expectations and follow up discussed.     Parts or all of this note were created by voice recognition software; typographical errors in translating may be present.

## 2022-11-09 NOTE — Clinical Note
Evelin and Dr. Phillip's staff, Good Day. This patient has seen Dr. Phillip in the past for similar issue and recently has been seeing me for chronic middle ear effusion. He is interested in tubes again (had them placed in OR in the past).  He returns to [facility] on 11/20/22. Can you make him an appt with Dr. Phillip please? Thank you. Jennifer Cuevas M.D.

## 2022-11-16 DIAGNOSIS — B20 HUMAN IMMUNODEFICIENCY VIRUS (HIV) DISEASE: ICD-10-CM

## 2022-11-16 RX ORDER — BICTEGRAVIR SODIUM, EMTRICITABINE, AND TENOFOVIR ALAFENAMIDE FUMARATE 50; 200; 25 MG/1; MG/1; MG/1
1 TABLET ORAL DAILY
Qty: 30 TABLET | Refills: 5 | Status: SHIPPED | OUTPATIENT
Start: 2022-11-16 | End: 2023-06-02 | Stop reason: SDUPTHER

## 2022-11-18 ENCOUNTER — PATIENT MESSAGE (OUTPATIENT)
Dept: PHARMACY | Facility: CLINIC | Age: 42
End: 2022-11-18
Payer: COMMERCIAL

## 2022-11-25 ENCOUNTER — SPECIALTY PHARMACY (OUTPATIENT)
Dept: PHARMACY | Facility: CLINIC | Age: 42
End: 2022-11-25
Payer: COMMERCIAL

## 2022-11-25 NOTE — TELEPHONE ENCOUNTER
Specialty Pharmacy - Refill Coordination    Specialty Medication Orders Linked to Encounter      Flowsheet Row Most Recent Value   Medication #1 pkfasigay-wzkarbff-immosjy ala (BIKTARVY) -25 mg (25 kg or greater) (Order#[id number], Rx#[id number])            Refill Questions - Documented Responses      Flowsheet Row Most Recent Value   Patient Availability and HIPAA Verification    Does patient want to proceed with activity? Yes   HIPAA/medical authority confirmed? Yes   Relationship to patient of person spoken to? Self   Refill Screening Questions    Changes to allergies? No   Changes to medications? No   New conditions since last clinic visit? No   Unplanned office visit, urgent care, ED, or hospital admission in the last 4 weeks? No   How does patient/caregiver feel medication is working? Good   Financial problems or insurance changes? No   How many doses of your specialty medications were missed in the last 4 weeks? 0   Would patient like to speak to a pharmacist? No   When does the patient need to receive the medication? 11/28/22   Refill Delivery Questions    How will the patient receive the medication? Pickup   When does the patient need to receive the medication? 11/28/22   Address in [facility] confirmed and updated if neccessary? Yes   Expected Copay ($) 0   Is the patient able to afford the medication copay? Yes   Payment Method zero copay   Days supply of Refill 30   Supplies needed? No supplies needed   Refill activity completed? Yes   Refill activity plan Refill scheduled   Shipment/Pickup Date: 11/28/22            Current Outpatient Medications   Medication Sig    qvocaiqgl-zblvzqce-jfidqao ala (BIKTARVY) -25 mg (25 kg or greater) Take 1 tablet by mouth once daily.    cetirizine (ZYRTEC) 10 MG tablet Take 1 tablet (10 mg total) by mouth once daily.    fluticasone propionate (FLONASE) 50 mcg/actuation nasal spray 2 sprays (100 mcg total) by Each Nostril route once daily.    lisinopriL  (PRINIVIL,ZESTRIL) 20 MG tablet Take 1 tablet (20 mg total) by mouth once daily.   Last reviewed on 11/9/2022  1:47 PM by Jennifer Cuevas MD    Review of patient's allergies indicates:  No Known Allergies Last reviewed on  11/9/2022 1:47 PM by Jennifer Cuevas      Tasks added this encounter   No tasks added.   Tasks due within next 3 months   12/21/2022 - Refill Call (Auto Added)  11/29/2022 - Pickup Reminder     Cuate Gray, PharmD  [facility]  [address] [place] [address]  Phone: [phone]  Fax: [phone]

## 2022-11-28 ENCOUNTER — PATIENT MESSAGE (OUTPATIENT)
Dept: OTOLARYNGOLOGY | Facility: CLINIC | Age: 42
End: 2022-11-28
Payer: COMMERCIAL

## 2022-11-29 ENCOUNTER — PATIENT MESSAGE (OUTPATIENT)
Dept: PHARMACY | Facility: CLINIC | Age: 42
End: 2022-11-29
Payer: COMMERCIAL

## 2022-11-30 ENCOUNTER — OFFICE VISIT (OUTPATIENT)
Dept: OTOLARYNGOLOGY | Facility: CLINIC | Age: 42
End: 2022-11-30
Payer: COMMERCIAL

## 2022-11-30 DIAGNOSIS — H65.91 RIGHT OTITIS MEDIA WITH EFFUSION: Primary | ICD-10-CM

## 2022-11-30 PROCEDURE — 4010F PR ACE/ARB THEARPY RXD/TAKEN: ICD-10-PCS | Mod: CPTII,S$GLB,, | Performed by: OTOLARYNGOLOGY

## 2022-11-30 PROCEDURE — 99499 NO LOS: ICD-10-PCS | Mod: S$GLB,,, | Performed by: OTOLARYNGOLOGY

## 2022-11-30 PROCEDURE — 99499 UNLISTED E&M SERVICE: CPT | Mod: S$GLB,,, | Performed by: OTOLARYNGOLOGY

## 2022-11-30 PROCEDURE — 69433 PR CREATE EARDRUM OPENING,LOCAL ANESTH: ICD-10-PCS | Mod: S$GLB,,, | Performed by: OTOLARYNGOLOGY

## 2022-11-30 PROCEDURE — 1159F MED LIST DOCD IN RCRD: CPT | Mod: CPTII,S$GLB,, | Performed by: OTOLARYNGOLOGY

## 2022-11-30 PROCEDURE — 99999 PR PBB SHADOW E&M-EST. PATIENT-LVL II: ICD-10-PCS | Mod: PBBFAC,,, | Performed by: OTOLARYNGOLOGY

## 2022-11-30 PROCEDURE — 1159F PR MEDICATION LIST DOCUMENTED IN MEDICAL RECORD: ICD-10-PCS | Mod: CPTII,S$GLB,, | Performed by: OTOLARYNGOLOGY

## 2022-11-30 PROCEDURE — 99999 PR PBB SHADOW E&M-EST. PATIENT-LVL II: CPT | Mod: PBBFAC,,, | Performed by: OTOLARYNGOLOGY

## 2022-11-30 PROCEDURE — 4010F ACE/ARB THERAPY RXD/TAKEN: CPT | Mod: CPTII,S$GLB,, | Performed by: OTOLARYNGOLOGY

## 2022-11-30 PROCEDURE — 69433 CREATE EARDRUM OPENING: CPT | Mod: S$GLB,,, | Performed by: OTOLARYNGOLOGY

## 2022-11-30 NOTE — PROGRESS NOTES
Pt with R OME.    Failed med Rx.    Has Had Edis OME with BMT in past.    After informed consent regarding infection, perforation, early extrusion and possible cholesteatoma formation the patient was brought to the minor procedure room and placed in the supine position. The operating microscope was then brought onto the field and the right tympanic membrane was visualized. Using a sterile, single use phenol kit the TM was sterilized and anesthetized. A myringotomy incision was made and the effusion evacuated. A sterile Estrada V tympanostomy tube was placed and the procedure was terminated. The patient tolerated the procedure well.      Water precautions discussed. RTC as directed.

## 2022-12-12 ENCOUNTER — OFFICE VISIT (OUTPATIENT)
Dept: URGENT CARE | Facility: CLINIC | Age: 42
End: 2022-12-12
Payer: COMMERCIAL

## 2022-12-12 VITALS
RESPIRATION RATE: 18 BRPM | HEIGHT: 71 IN | OXYGEN SATURATION: 95 % | WEIGHT: 290 LBS | SYSTOLIC BLOOD PRESSURE: 134 MMHG | DIASTOLIC BLOOD PRESSURE: 81 MMHG | BODY MASS INDEX: 40.6 KG/M2 | HEART RATE: 87 BPM | TEMPERATURE: 98 F

## 2022-12-12 DIAGNOSIS — J02.9 SORE THROAT: ICD-10-CM

## 2022-12-12 DIAGNOSIS — J02.0 STREP PHARYNGITIS: Primary | ICD-10-CM

## 2022-12-12 LAB
CTP QC/QA: YES
MOLECULAR STREP A: POSITIVE

## 2022-12-12 PROCEDURE — 99213 OFFICE O/P EST LOW 20 MIN: CPT | Mod: S$GLB,,, | Performed by: NURSE PRACTITIONER

## 2022-12-12 PROCEDURE — 4010F PR ACE/ARB THEARPY RXD/TAKEN: ICD-10-PCS | Mod: CPTII,S$GLB,, | Performed by: NURSE PRACTITIONER

## 2022-12-12 PROCEDURE — 1160F PR REVIEW ALL MEDS BY PRESCRIBER/CLIN PHARMACIST DOCUMENTED: ICD-10-PCS | Mod: CPTII,S$GLB,, | Performed by: NURSE PRACTITIONER

## 2022-12-12 PROCEDURE — 1160F RVW MEDS BY RX/DR IN RCRD: CPT | Mod: CPTII,S$GLB,, | Performed by: NURSE PRACTITIONER

## 2022-12-12 PROCEDURE — 3075F PR MOST RECENT SYSTOLIC BLOOD PRESS GE 130-139MM HG: ICD-10-PCS | Mod: CPTII,S$GLB,, | Performed by: NURSE PRACTITIONER

## 2022-12-12 PROCEDURE — 99213 PR OFFICE/OUTPT VISIT, EST, LEVL III, 20-29 MIN: ICD-10-PCS | Mod: S$GLB,,, | Performed by: NURSE PRACTITIONER

## 2022-12-12 PROCEDURE — 87651 POCT STREP A MOLECULAR: ICD-10-PCS | Mod: QW,S$GLB,, | Performed by: NURSE PRACTITIONER

## 2022-12-12 PROCEDURE — 1159F MED LIST DOCD IN RCRD: CPT | Mod: CPTII,S$GLB,, | Performed by: NURSE PRACTITIONER

## 2022-12-12 PROCEDURE — 3075F SYST BP GE 130 - 139MM HG: CPT | Mod: CPTII,S$GLB,, | Performed by: NURSE PRACTITIONER

## 2022-12-12 PROCEDURE — 4010F ACE/ARB THERAPY RXD/TAKEN: CPT | Mod: CPTII,S$GLB,, | Performed by: NURSE PRACTITIONER

## 2022-12-12 PROCEDURE — 3008F BODY MASS INDEX DOCD: CPT | Mod: CPTII,S$GLB,, | Performed by: NURSE PRACTITIONER

## 2022-12-12 PROCEDURE — 3008F PR BODY MASS INDEX (BMI) DOCUMENTED: ICD-10-PCS | Mod: CPTII,S$GLB,, | Performed by: NURSE PRACTITIONER

## 2022-12-12 PROCEDURE — 3079F PR MOST RECENT DIASTOLIC BLOOD PRESSURE 80-89 MM HG: ICD-10-PCS | Mod: CPTII,S$GLB,, | Performed by: NURSE PRACTITIONER

## 2022-12-12 PROCEDURE — 1159F PR MEDICATION LIST DOCUMENTED IN MEDICAL RECORD: ICD-10-PCS | Mod: CPTII,S$GLB,, | Performed by: NURSE PRACTITIONER

## 2022-12-12 PROCEDURE — 87651 STREP A DNA AMP PROBE: CPT | Mod: QW,S$GLB,, | Performed by: NURSE PRACTITIONER

## 2022-12-12 PROCEDURE — 3079F DIAST BP 80-89 MM HG: CPT | Mod: CPTII,S$GLB,, | Performed by: NURSE PRACTITIONER

## 2022-12-12 RX ORDER — AMOXICILLIN AND CLAVULANATE POTASSIUM 875; 125 MG/1; MG/1
1 TABLET, FILM COATED ORAL 2 TIMES DAILY
Qty: 20 TABLET | Refills: 0 | Status: SHIPPED | OUTPATIENT
Start: 2022-12-12 | End: 2022-12-22

## 2022-12-12 RX ORDER — AMOXICILLIN AND CLAVULANATE POTASSIUM 875; 125 MG/1; MG/1
1 TABLET, FILM COATED ORAL 2 TIMES DAILY
Qty: 20 TABLET | Refills: 0 | Status: SHIPPED | OUTPATIENT
Start: 2022-12-12 | End: 2022-12-12

## 2022-12-12 NOTE — PATIENT INSTRUCTIONS
If symptoms do not improve within 2-3 days of antibiotic therapy return to clinic. If you begin to have difficulty swallowing, pain more so on one side of throat, muffled voice or drooling go to ER immediately. Complete full course of antibiotic therapy.

## 2022-12-12 NOTE — PROGRESS NOTES
"Subjective:       Patient ID: Francisco May II is a 42 y.o. male.    Vitals:  height is 5' 11" (1.803 m) and weight is 131.5 kg (290 lb). His temporal temperature is 97.5 °F (36.4 °C). His blood pressure is 134/81 and his pulse is 87. His respiration is 18 and oxygen saturation is 95%.     Chief Complaint: Cough    Patient c/o sore throat and cough x2 days ago .Patient states that when he swallow it hurts.Patient states that he has had strep in the past and thinks he may have it again. Pain level 8. Advil at 8 am   Provider note begins below    Patient was treated for strep 2 months ago.  He was treated with amoxicillin.  Reports improvement.  Just started with symptoms 2 days ago.  Denies any known contacts with strep.  States he has changed out his CPAP after last visit.    Sore Throat   This is a new problem. Episode onset: 2 day ago. The problem has been gradually worsening. The pain is worse on the left side. There has been no fever. The pain is at a severity of 8/10. The pain is moderate. Associated symptoms include congestion, coughing, shortness of breath, swollen glands and trouble swallowing. Pertinent negatives include no diarrhea or vomiting. He has had no exposure to strep or mono. Treatments tried: Advil. The treatment provided no relief.     Constitution: Negative for fatigue and fever.   HENT:  Positive for congestion, sore throat and trouble swallowing.    Cardiovascular:  Negative for chest pain and sob on exertion.   Respiratory:  Positive for cough and shortness of breath.    Gastrointestinal:  Negative for nausea, vomiting, constipation and diarrhea.     Objective:      Physical Exam   Constitutional: He is oriented to person, place, and time.   HENT:   Head: Normocephalic and atraumatic.   Mouth/Throat: Oropharyngeal exudate and posterior oropharyngeal erythema present.   Cardiovascular: Normal rate.   Pulmonary/Chest: Effort normal. No respiratory distress.   Abdominal: Normal " appearance.   Neurological: He is alert and oriented to person, place, and time.   Skin: Skin is dry.   Psychiatric: His behavior is normal. Mood normal.         Results for orders placed or performed in visit on 12/12/22   POCT Strep A, Molecular   Result Value Ref Range    Molecular Strep A, POC Positive (A) Negative     Acceptable Yes       Assessment:       1. Strep pharyngitis    2. Sore throat          Plan:       Strep test positive   Augmentin  Will use Cepacol instead of magic mouthwash   Patient will change out CPAP and toothbrush.      Strep pharyngitis  -     amoxicillin-clavulanate 875-125mg (AUGMENTIN) 875-125 mg per tablet; Take 1 tablet by mouth 2 (two) times daily. for 10 days  Dispense: 20 tablet; Refill: 0    Sore throat  -     POCT Strep A, Molecular

## 2022-12-21 ENCOUNTER — PATIENT MESSAGE (OUTPATIENT)
Dept: PHARMACY | Facility: CLINIC | Age: 42
End: 2022-12-21
Payer: COMMERCIAL

## 2022-12-27 ENCOUNTER — SPECIALTY PHARMACY (OUTPATIENT)
Dept: PHARMACY | Facility: CLINIC | Age: 42
End: 2022-12-27
Payer: COMMERCIAL

## 2022-12-27 NOTE — TELEPHONE ENCOUNTER
Specialty Pharmacy - Refill Coordination    Specialty Medication Orders Linked to Encounter      Flowsheet Row Most Recent Value   Medication #1 twymojutj-fdudvnaa-ffowgxr ala (BIKTARVY) -25 mg (25 kg or greater) (Order#[id number], Rx#[id number])            Refill Questions - Documented Responses      Flowsheet Row Most Recent Value   Patient Availability and HIPAA Verification    Does patient want to proceed with activity? Yes   HIPAA/medical authority confirmed? Yes   Relationship to patient of person spoken to? Self   Refill Screening Questions    Changes to allergies? No   Changes to medications? No   New conditions since last clinic visit? No   Unplanned office visit, urgent care, ED, or hospital admission in the last 4 weeks? Yes  [strep throat,  all symptoms resolved]   How does patient/caregiver feel medication is working? Good   Financial problems or insurance changes? No   How many doses of your specialty medications were missed in the last 4 weeks? 0   Would patient like to speak to a pharmacist? No   When does the patient need to receive the medication? 01/01/23   Refill Delivery Questions    How will the patient receive the medication? Pickup   When does the patient need to receive the medication? 01/01/23   Address in [facility] confirmed and updated if neccessary? Yes   Expected Copay ($) 0   Is the patient able to afford the medication copay? Yes   Payment Method zero copay   Days supply of Refill 30   Supplies needed? No supplies needed   Refill activity completed? Yes   Refill activity plan Refill scheduled   Shipment/Pickup Date: 12/27/22            Current Outpatient Medications   Medication Sig    xhmrazsrt-rxdvadyz-qdsdckk ala (BIKTARVY) -25 mg (25 kg or greater) Take 1 tablet by mouth once daily.    cetirizine (ZYRTEC) 10 MG tablet Take 1 tablet (10 mg total) by mouth once daily.    fluticasone propionate (FLONASE) 50 mcg/actuation nasal spray 2 sprays (100 mcg total) by  Each Nostril route once daily.    lisinopriL (PRINIVIL,ZESTRIL) 20 MG tablet Take 1 tablet (20 mg total) by mouth once daily.   Last reviewed on 12/12/2022 10:19 AM by Kortney Campbell NP    Review of patient's allergies indicates:  No Known Allergies Last reviewed on  12/12/2022 10:19 AM by Kortney Campbell      Tasks added this encounter   1/24/2023 - Refill Call (Auto Added)  12/28/2022 - Pickup Reminder   Tasks due within next 3 months   3/9/2023 - Clinical - Follow Up Assesement (Annual)     Gladys Scott, Patient Care Assistant  [facility]  [address]  [facility] [address]  Phone: [phone]  Fax: [phone]

## 2023-01-24 ENCOUNTER — SPECIALTY PHARMACY (OUTPATIENT)
Dept: PHARMACY | Facility: CLINIC | Age: 43
End: 2023-01-24
Payer: COMMERCIAL

## 2023-01-24 NOTE — TELEPHONE ENCOUNTER
Specialty Pharmacy - Refill Coordination    Specialty Medication Orders Linked to Encounter      Flowsheet Row Most Recent Value   Medication #1 lapvgocox-rhkxpkax-pagmsjm ala (BIKTARVY) -25 mg (25 kg or greater) (Order#[id number], Rx#[id number])            Refill Questions - Documented Responses      Flowsheet Row Most Recent Value   Patient Availability and HIPAA Verification    Does patient want to proceed with activity? Yes   HIPAA/medical authority confirmed? Yes   Relationship to patient of person spoken to? Self   Refill Screening Questions    Changes to allergies? No   Changes to medications? No   New conditions since last clinic visit? No   Unplanned office visit, urgent care, ED, or hospital admission in the last 4 weeks? No   How does patient/caregiver feel medication is working? Good   Financial problems or insurance changes? No   How many doses of your specialty medications were missed in the last 4 weeks? 0   Would patient like to speak to a pharmacist? No   When does the patient need to receive the medication? 01/28/23   Refill Delivery Questions    How will the patient receive the medication? Pickup   When does the patient need to receive the medication? 01/28/23   Shipping Address Home   Address in [facility] confirmed and updated if neccessary? Yes   Expected Copay ($) 0   Is the patient able to afford the medication copay? Yes   Payment Method zero copay   Days supply of Refill 30   Refill activity completed? Yes   Refill activity plan Refill scheduled   Shipment/Pickup Date: 01/26/23            Current Outpatient Medications   Medication Sig    djqmwacxt-ahrxiyqk-jjwhweb ala (BIKTARVY) -25 mg (25 kg or greater) Take 1 tablet by mouth once daily.    cetirizine (ZYRTEC) 10 MG tablet Take 1 tablet (10 mg total) by mouth once daily.    fluticasone propionate (FLONASE) 50 mcg/actuation nasal spray 2 sprays (100 mcg total) by Each Nostril route once daily.    lisinopriL  (PRINIVIL,ZESTRIL) 20 MG tablet Take 1 tablet (20 mg total) by mouth once daily.   Last reviewed on 12/12/2022 10:19 AM by Kortney Campbell NP    Review of patient's allergies indicates:  No Known Allergies Last reviewed on  12/12/2022 10:19 AM by Kortney Campbell      Tasks added this encounter   2/20/2023 - Refill Call (Auto Added)  1/27/2023 - Pickup Reminder   Tasks due within next 3 months   3/9/2023 - Clinical - Follow Up Assesement (Annual)     [facility]  [address]  [facility] [address]  Phone: [phone]  Fax: [phone]

## 2023-01-27 ENCOUNTER — PATIENT MESSAGE (OUTPATIENT)
Dept: PHARMACY | Facility: CLINIC | Age: 43
End: 2023-01-27
Payer: COMMERCIAL

## 2023-02-20 ENCOUNTER — SPECIALTY PHARMACY (OUTPATIENT)
Dept: PHARMACY | Facility: CLINIC | Age: 43
End: 2023-02-20
Payer: COMMERCIAL

## 2023-02-20 NOTE — TELEPHONE ENCOUNTER
Specialty Pharmacy - Refill Coordination    Specialty Medication Orders Linked to Encounter      Flowsheet Row Most Recent Value   Medication #1 ddjboikkm-vpqiolqp-flgmbxy ala (BIKTARVY) -25 mg (25 kg or greater) (Order#[id number], Rx#[id number])            Refill Questions - Documented Responses      Flowsheet Row Most Recent Value   Patient Availability and HIPAA Verification    Does patient want to proceed with activity? Yes   HIPAA/medical authority confirmed? Yes   Relationship to patient of person spoken to? Self   Refill Screening Questions    Changes to allergies? No   Changes to medications? No   New conditions since last clinic visit? No   Unplanned office visit, urgent care, ED, or hospital admission in the last 4 weeks? No   How does patient/caregiver feel medication is working? Good   Financial problems or insurance changes? No   How many doses of your specialty medications were missed in the last 4 weeks? 0   Would patient like to speak to a pharmacist? No   When does the patient need to receive the medication? 03/01/23   Refill Delivery Questions    How will the patient receive the medication? Pickup   When does the patient need to receive the medication? 03/01/23   Shipping Address Home   Address in [facility] confirmed and updated if neccessary? Yes   Expected Copay ($) 0   Is the patient able to afford the medication copay? Yes   Payment Method zero copay   Days supply of Refill 30   Refill activity completed? Yes   Refill activity plan Refill scheduled   Shipment/Pickup Date: 02/28/23            Current Outpatient Medications   Medication Sig    ufuatcxmx-ssfiftvr-hpnuvis ala (BIKTARVY) -25 mg (25 kg or greater) Take 1 tablet by mouth once daily.    cetirizine (ZYRTEC) 10 MG tablet Take 1 tablet (10 mg total) by mouth once daily.    fluticasone propionate (FLONASE) 50 mcg/actuation nasal spray 2 sprays (100 mcg total) by Each Nostril route once daily.    lisinopriL  (PRINIVIL,ZESTRIL) 20 MG tablet Take 1 tablet (20 mg total) by mouth once daily.   Last reviewed on 12/12/2022 10:19 AM by Kortney Campbell NP    Review of patient's allergies indicates:  No Known Allergies Last reviewed on  12/12/2022 10:19 AM by Kortney Campbell      Tasks added this encounter   3/24/2023 - Refill Call (Auto Added)  3/1/2023 - Pickup Reminder   Tasks due within next 3 months   3/9/2023 - Clinical - Follow Up Assesement (Annual)     [facility]  [address]  [place] [address]  Phone: [phone]  Fax: [phone]

## 2023-02-22 ENCOUNTER — LAB VISIT (OUTPATIENT)
Dept: LAB | Facility: HOSPITAL | Age: 43
End: 2023-02-22
Attending: INTERNAL MEDICINE
Payer: COMMERCIAL

## 2023-02-22 ENCOUNTER — PATIENT MESSAGE (OUTPATIENT)
Dept: INFECTIOUS DISEASES | Facility: CLINIC | Age: 43
End: 2023-02-22

## 2023-02-22 ENCOUNTER — OFFICE VISIT (OUTPATIENT)
Dept: INFECTIOUS DISEASES | Facility: CLINIC | Age: 43
End: 2023-02-22
Payer: COMMERCIAL

## 2023-02-22 VITALS
SYSTOLIC BLOOD PRESSURE: 130 MMHG | WEIGHT: 289.88 LBS | BODY MASS INDEX: 40.58 KG/M2 | HEIGHT: 71 IN | TEMPERATURE: 98 F | DIASTOLIC BLOOD PRESSURE: 78 MMHG | HEART RATE: 73 BPM

## 2023-02-22 DIAGNOSIS — I10 ESSENTIAL HYPERTENSION: ICD-10-CM

## 2023-02-22 DIAGNOSIS — E55.9 HYPOVITAMINOSIS D: ICD-10-CM

## 2023-02-22 DIAGNOSIS — B20 HUMAN IMMUNODEFICIENCY VIRUS (HIV) DISEASE: ICD-10-CM

## 2023-02-22 DIAGNOSIS — R73.9 HYPERGLYCEMIA: ICD-10-CM

## 2023-02-22 DIAGNOSIS — Z00.00 HEALTHCARE MAINTENANCE: ICD-10-CM

## 2023-02-22 DIAGNOSIS — B20 HUMAN IMMUNODEFICIENCY VIRUS (HIV) DISEASE: Primary | ICD-10-CM

## 2023-02-22 DIAGNOSIS — E29.1 HYPOGONADISM IN MALE: ICD-10-CM

## 2023-02-22 DIAGNOSIS — E66.9 OBESITY (BMI 35.0-39.9 WITHOUT COMORBIDITY): ICD-10-CM

## 2023-02-22 LAB
25(OH)D3+25(OH)D2 SERPL-MCNC: 18 NG/ML (ref 30–96)
ALBUMIN SERPL BCP-MCNC: 4 G/DL (ref 3.5–5.2)
ALP SERPL-CCNC: 73 U/L (ref 55–135)
ALT SERPL W/O P-5'-P-CCNC: 29 U/L (ref 10–44)
ANION GAP SERPL CALC-SCNC: 11 MMOL/L (ref 8–16)
AST SERPL-CCNC: 27 U/L (ref 10–40)
BASOPHILS # BLD AUTO: 0.1 K/UL (ref 0–0.2)
BASOPHILS NFR BLD: 1.2 % (ref 0–1.9)
BILIRUB SERPL-MCNC: 0.7 MG/DL (ref 0.1–1)
BILIRUB UR QL STRIP: NEGATIVE
BUN SERPL-MCNC: 15 MG/DL (ref 6–20)
CALCIUM SERPL-MCNC: 9.5 MG/DL (ref 8.7–10.5)
CHLORIDE SERPL-SCNC: 102 MMOL/L (ref 95–110)
CLARITY UR REFRACT.AUTO: CLEAR
CO2 SERPL-SCNC: 28 MMOL/L (ref 23–29)
COLOR UR AUTO: YELLOW
COMPLEXED PSA SERPL-MCNC: 1 NG/ML (ref 0–4)
CREAT SERPL-MCNC: 1.1 MG/DL (ref 0.5–1.4)
DIFFERENTIAL METHOD: NORMAL
EOSINOPHIL # BLD AUTO: 0.3 K/UL (ref 0–0.5)
EOSINOPHIL NFR BLD: 3.2 % (ref 0–8)
ERYTHROCYTE [DISTWIDTH] IN BLOOD BY AUTOMATED COUNT: 13.7 % (ref 11.5–14.5)
EST. GFR  (NO RACE VARIABLE): >60 ML/MIN/1.73 M^2
ESTIMATED AVG GLUCOSE: 120 MG/DL (ref 68–131)
GLUCOSE SERPL-MCNC: 85 MG/DL (ref 70–110)
GLUCOSE UR QL STRIP: NEGATIVE
HBA1C MFR BLD: 5.8 % (ref 4–5.6)
HCT VFR BLD AUTO: 45.4 % (ref 40–54)
HGB BLD-MCNC: 14.9 G/DL (ref 14–18)
HGB UR QL STRIP: NEGATIVE
IMM GRANULOCYTES # BLD AUTO: 0.02 K/UL (ref 0–0.04)
IMM GRANULOCYTES NFR BLD AUTO: 0.2 % (ref 0–0.5)
KETONES UR QL STRIP: NEGATIVE
LEUKOCYTE ESTERASE UR QL STRIP: NEGATIVE
LYMPHOCYTES # BLD AUTO: 2.5 K/UL (ref 1–4.8)
LYMPHOCYTES NFR BLD: 30.6 % (ref 18–48)
MCH RBC QN AUTO: 29.4 PG (ref 27–31)
MCHC RBC AUTO-ENTMCNC: 32.8 G/DL (ref 32–36)
MCV RBC AUTO: 90 FL (ref 82–98)
MONOCYTES # BLD AUTO: 0.6 K/UL (ref 0.3–1)
MONOCYTES NFR BLD: 7.6 % (ref 4–15)
NEUTROPHILS # BLD AUTO: 4.6 K/UL (ref 1.8–7.7)
NEUTROPHILS NFR BLD: 57.2 % (ref 38–73)
NITRITE UR QL STRIP: NEGATIVE
NRBC BLD-RTO: 0 /100 WBC
PH UR STRIP: 6 [PH] (ref 5–8)
PLATELET # BLD AUTO: 309 K/UL (ref 150–450)
PMV BLD AUTO: 9.8 FL (ref 9.2–12.9)
POTASSIUM SERPL-SCNC: 4.1 MMOL/L (ref 3.5–5.1)
PROT SERPL-MCNC: 7.4 G/DL (ref 6–8.4)
PROT UR QL STRIP: ABNORMAL
RBC # BLD AUTO: 5.06 M/UL (ref 4.6–6.2)
SODIUM SERPL-SCNC: 141 MMOL/L (ref 136–145)
SP GR UR STRIP: 1.03 (ref 1–1.03)
URN SPEC COLLECT METH UR: ABNORMAL
WBC # BLD AUTO: 8.06 K/UL (ref 3.9–12.7)

## 2023-02-22 PROCEDURE — 81003 URINALYSIS AUTO W/O SCOPE: CPT | Performed by: INTERNAL MEDICINE

## 2023-02-22 PROCEDURE — 99999 PR PBB SHADOW E&M-EST. PATIENT-LVL IV: ICD-10-PCS | Mod: PBBFAC,,, | Performed by: INTERNAL MEDICINE

## 2023-02-22 PROCEDURE — 1160F PR REVIEW ALL MEDS BY PRESCRIBER/CLIN PHARMACIST DOCUMENTED: ICD-10-PCS | Mod: CPTII,S$GLB,, | Performed by: INTERNAL MEDICINE

## 2023-02-22 PROCEDURE — 86593 SYPHILIS TEST NON-TREP QUANT: CPT | Performed by: INTERNAL MEDICINE

## 2023-02-22 PROCEDURE — 86780 TREPONEMA PALLIDUM: CPT | Performed by: INTERNAL MEDICINE

## 2023-02-22 PROCEDURE — 3078F DIAST BP <80 MM HG: CPT | Mod: CPTII,S$GLB,, | Performed by: INTERNAL MEDICINE

## 2023-02-22 PROCEDURE — 1159F MED LIST DOCD IN RCRD: CPT | Mod: CPTII,S$GLB,, | Performed by: INTERNAL MEDICINE

## 2023-02-22 PROCEDURE — 86592 SYPHILIS TEST NON-TREP QUAL: CPT | Performed by: INTERNAL MEDICINE

## 2023-02-22 PROCEDURE — 80053 COMPREHEN METABOLIC PANEL: CPT | Performed by: INTERNAL MEDICINE

## 2023-02-22 PROCEDURE — 85025 COMPLETE CBC W/AUTO DIFF WBC: CPT | Performed by: INTERNAL MEDICINE

## 2023-02-22 PROCEDURE — 3008F BODY MASS INDEX DOCD: CPT | Mod: CPTII,S$GLB,, | Performed by: INTERNAL MEDICINE

## 2023-02-22 PROCEDURE — 3075F SYST BP GE 130 - 139MM HG: CPT | Mod: CPTII,S$GLB,, | Performed by: INTERNAL MEDICINE

## 2023-02-22 PROCEDURE — 3075F PR MOST RECENT SYSTOLIC BLOOD PRESS GE 130-139MM HG: ICD-10-PCS | Mod: CPTII,S$GLB,, | Performed by: INTERNAL MEDICINE

## 2023-02-22 PROCEDURE — 99999 PR PBB SHADOW E&M-EST. PATIENT-LVL IV: CPT | Mod: PBBFAC,,, | Performed by: INTERNAL MEDICINE

## 2023-02-22 PROCEDURE — 3044F PR MOST RECENT HEMOGLOBIN A1C LEVEL <7.0%: ICD-10-PCS | Mod: CPTII,S$GLB,, | Performed by: INTERNAL MEDICINE

## 2023-02-22 PROCEDURE — 1160F RVW MEDS BY RX/DR IN RCRD: CPT | Mod: CPTII,S$GLB,, | Performed by: INTERNAL MEDICINE

## 2023-02-22 PROCEDURE — 82306 VITAMIN D 25 HYDROXY: CPT | Performed by: INTERNAL MEDICINE

## 2023-02-22 PROCEDURE — 3078F PR MOST RECENT DIASTOLIC BLOOD PRESSURE < 80 MM HG: ICD-10-PCS | Mod: CPTII,S$GLB,, | Performed by: INTERNAL MEDICINE

## 2023-02-22 PROCEDURE — 84153 ASSAY OF PSA TOTAL: CPT | Performed by: INTERNAL MEDICINE

## 2023-02-22 PROCEDURE — 87591 N.GONORRHOEAE DNA AMP PROB: CPT | Performed by: INTERNAL MEDICINE

## 2023-02-22 PROCEDURE — 99215 OFFICE O/P EST HI 40 MIN: CPT | Mod: S$GLB,,, | Performed by: INTERNAL MEDICINE

## 2023-02-22 PROCEDURE — 36415 COLL VENOUS BLD VENIPUNCTURE: CPT | Performed by: INTERNAL MEDICINE

## 2023-02-22 PROCEDURE — 1159F PR MEDICATION LIST DOCUMENTED IN MEDICAL RECORD: ICD-10-PCS | Mod: CPTII,S$GLB,, | Performed by: INTERNAL MEDICINE

## 2023-02-22 PROCEDURE — 3044F HG A1C LEVEL LT 7.0%: CPT | Mod: CPTII,S$GLB,, | Performed by: INTERNAL MEDICINE

## 2023-02-22 PROCEDURE — 87536 HIV-1 QUANT&REVRSE TRNSCRPJ: CPT | Performed by: INTERNAL MEDICINE

## 2023-02-22 PROCEDURE — 3008F PR BODY MASS INDEX (BMI) DOCUMENTED: ICD-10-PCS | Mod: CPTII,S$GLB,, | Performed by: INTERNAL MEDICINE

## 2023-02-22 PROCEDURE — 86361 T CELL ABSOLUTE COUNT: CPT | Performed by: INTERNAL MEDICINE

## 2023-02-22 PROCEDURE — 99215 PR OFFICE/OUTPT VISIT, EST, LEVL V, 40-54 MIN: ICD-10-PCS | Mod: S$GLB,,, | Performed by: INTERNAL MEDICINE

## 2023-02-22 PROCEDURE — 83036 HEMOGLOBIN GLYCOSYLATED A1C: CPT | Performed by: INTERNAL MEDICINE

## 2023-02-22 NOTE — PROGRESS NOTES
Subjective:      Patient ID: Francisco May II is a 42 y.o. male.    Chief Complaint:Follow-up      History of Present Illness    41 year old male with HIV.  He is here today for follow up.      Interval History  Lost 10-15 lbs.  Joined [employer] recently.  Walks at least 2 miles a day to work for past month.  Doing this as part of a fitness challenge.Saw ENT for persistent ear problems.  Had tubes placed in his ear.  Had strep throat twice.      Medical History  HTN  Hypotestosteronism  Depression.  VITOR - uses cpap machine  DM  HIV  Syphilis  Gonorrhea  Chlamydia        Surgical History  None     Family history  Stroke  HTN  DM     Medication  Biktarvy.  Lisinopril.  Multivitamin.  Testosterone gel.     Family History  Father had stroke.        Social History  Visited [place] X 2 weeks.  Visited [place] [facility].  Just bought a house in [place].  Brother will be moving there. Sister lives in [place]. Maternal grandparents are still living and independent.  Parents living.  He is still single.  Smokes occasional weed.  Has a drink every other days.  No illicit substances.  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  Started the business in January 2020. Recently moved and now lives in the [place] [facility].      Review of Systems   All other systems reviewed and are negative.  Objective:   Physical Exam  Vitals and nursing note reviewed.   Constitutional:       General: He is not in acute distress.     Appearance: He is well-developed. He is not diaphoretic.   HENT:      Head: Normocephalic and atraumatic.      Right Ear: External ear normal.      Left Ear: External ear normal.      Nose: Nose normal.      Mouth/Throat:      Pharynx: No oropharyngeal exudate.   Eyes:      General: No scleral icterus.        Right eye: No discharge.         Left eye: No discharge.      Conjunctiva/sclera: Conjunctivae normal.      Pupils: Pupils are equal,  round, and reactive to light.   Neck:      Thyroid: No thyromegaly.      Vascular: No JVD.      Trachea: No tracheal deviation.   Cardiovascular:      Rate and Rhythm: Normal rate and regular rhythm.      Heart sounds: No murmur heard.    No friction rub. No gallop.   Pulmonary:      Effort: Pulmonary effort is normal. No respiratory distress.      Breath sounds: Normal breath sounds. No stridor. No wheezing or rales.   Chest:      Chest wall: No tenderness.   Abdominal:      General: Bowel sounds are normal. There is no distension.      Palpations: Abdomen is soft. There is no mass.      Tenderness: There is no abdominal tenderness. There is no guarding or rebound.   Musculoskeletal:         General: No tenderness. Normal range of motion.      Cervical back: Normal range of motion and neck supple.   Lymphadenopathy:      Cervical: No cervical adenopathy.   Skin:     General: Skin is warm.      Coloration: Skin is not pale.      Findings: No erythema or rash.   Neurological:      Mental Status: He is alert and oriented to person, place, and time.      Cranial Nerves: No cranial nerve deficit.      Motor: No abnormal muscle tone.      Coordination: Coordination normal.      Deep Tendon Reflexes: Reflexes are normal and symmetric. Reflexes normal.   Psychiatric:         Behavior: Behavior normal.         Thought Content: Thought content normal.         Judgment: Judgment normal.     Assessment:       1. Human immunodeficiency virus (HIV) disease :  Will check labs.  Will continue him on biktarvy.   2. Essential hypertension : Will check CMP.  Will continue lisinopril.   3. Hypogonadism in male :  will monitor testosterone levels.   4. Healthcare maintenance :  Will PSA.   5. Hypovitaminosis D :  Will check vitamin D levels.   6. Hyperglycemia :  Will check a1c.   7. Obesity (BMI 35.0-39.9 without comorbidity) :  Will refer to bariatric medicine.  Will check hemoglobin a1c.         Plan:       Human immunodeficiency  virus (HIV) disease  -     CD4 T-[place] Cells; Future; Expected date: 02/22/2023  -     HIV RNA, Quantitative, PCR; Future; Expected date: 02/22/2023  -     CBC Auto Differential; Future; Expected date: 02/22/2023  -     RPR; Future; Expected date: 02/22/2023  -     C. trachomatis/N. gonorrhoeae by AMP DNA    Essential hypertension  -     Comprehensive Metabolic Panel; Future; Expected date: 02/22/2023    Hypogonadism in male    Healthcare maintenance  -     PSA, SCREENING; Future; Expected date: 02/22/2023    Hypovitaminosis D  -     Ambulatory referral/consult to Bariatric Medicine; Future; Expected date: 03/01/2023  -     Vitamin D; Future; Expected date: 02/22/2023  -     ergocalciferol (ERGOCALCIFEROL) 50,000 unit Cap; Take 1 capsule (50,000 Units total) by mouth every 7 days. for 24 doses  Dispense: 24 capsule; Refill: 0    Hyperglycemia  -     Hemoglobin A1C; Future; Expected date: 02/22/2023  -     Urinalysis    Obesity (BMI 35.0-39.9 without comorbidity)  -     Ambulatory referral/consult to Bariatric Medicine; Future; Expected date: 03/01/2023  -     Hemoglobin A1C; Future; Expected date: 02/22/2023

## 2023-02-23 ENCOUNTER — PATIENT MESSAGE (OUTPATIENT)
Dept: INFECTIOUS DISEASES | Facility: CLINIC | Age: 43
End: 2023-02-23
Payer: COMMERCIAL

## 2023-02-23 ENCOUNTER — TELEPHONE (OUTPATIENT)
Dept: INFECTIOUS DISEASES | Facility: CLINIC | Age: 43
End: 2023-02-23
Payer: COMMERCIAL

## 2023-02-23 LAB
C TRACH DNA SPEC QL NAA+PROBE: NOT DETECTED
CD3+CD4+ CELLS # BLD: 933 CELLS/UL (ref 300–1400)
CD3+CD4+ CELLS NFR BLD: 37.5 % (ref 28–57)
HIV1 RNA # SERPL NAA+PROBE: NOT DETECTED COPIES/ML
HIV1 RNA SERPL QL NAA+PROBE: NOT DETECTED
N GONORRHOEA DNA SPEC QL NAA+PROBE: NOT DETECTED
RPR SER QL: REACTIVE
RPR SER-TITR: ABNORMAL {TITER}

## 2023-02-23 RX ORDER — ERGOCALCIFEROL 1.25 MG/1
50000 CAPSULE ORAL
Qty: 24 CAPSULE | Refills: 0 | Status: SHIPPED | OUTPATIENT
Start: 2023-02-23 | End: 2023-08-04

## 2023-02-24 LAB — T PALLIDUM AB SER QL IF: REACTIVE

## 2023-03-23 ENCOUNTER — TELEPHONE (OUTPATIENT)
Dept: BARIATRICS | Facility: CLINIC | Age: 43
End: 2023-03-23
Payer: COMMERCIAL

## 2023-03-23 NOTE — TELEPHONE ENCOUNTER
Notified patient of the date & time of financial phone call appt.  Pt aware appt is a telephone call.    Dashboard updated  Appt. 03-

## 2023-03-24 ENCOUNTER — SPECIALTY PHARMACY (OUTPATIENT)
Dept: PHARMACY | Facility: CLINIC | Age: 43
End: 2023-03-24
Payer: COMMERCIAL

## 2023-03-24 DIAGNOSIS — B20 HUMAN IMMUNODEFICIENCY VIRUS (HIV) DISEASE: Primary | ICD-10-CM

## 2023-03-24 NOTE — TELEPHONE ENCOUNTER
Specialty Pharmacy - Clinical Reassessment  Specialty Pharmacy - Refill Coordination    Specialty Medication Orders Linked to Encounter      Flowsheet Row Most Recent Value   Medication #1 lsqmdlrak-zhqbjpeo-bkxeumd ala (BIKTARVY) -25 mg (25 kg or greater) (Order#[id number], Rx#[id number])            Refill Questions - Documented Responses      Flowsheet Row Most Recent Value   Refill Screening Questions    Changes to allergies? No   Changes to medications? No   New conditions since last clinic visit? No   Unplanned office visit, urgent care, ED, or hospital admission in the last 4 weeks? No   How does patient/caregiver feel medication is working? Good   Financial problems or insurance changes? No   How many doses of your specialty medications were missed in the last 4 weeks? 0   When does the patient need to receive the medication? 03/31/23   Refill Delivery Questions    How will the patient receive the medication? Pickup   When does the patient need to receive the medication? 03/31/23   Expected Copay ($) 0   Is the patient able to afford the medication copay? Yes   Payment Method zero copay   Days supply of Refill 30   Supplies needed? No supplies needed   Refill activity completed? Yes   Refill activity plan Refill scheduled   Shipment/Pickup Date: 03/27/23            Current Outpatient Medications   Medication Sig    tpelgflhu-kojfujzo-hxipdbn ala (BIKTARVY) -25 mg (25 kg or greater) Take 1 tablet by mouth once daily.    cetirizine (ZYRTEC) 10 MG tablet Take 1 tablet (10 mg total) by mouth once daily. (Patient not taking: Reported on 2/22/2023)    ergocalciferol (ERGOCALCIFEROL) 50,000 unit Cap Take 1 capsule (50,000 Units total) by mouth every 7 days. for 24 doses    fluticasone propionate (FLONASE) 50 mcg/actuation nasal spray 2 sprays (100 mcg total) by Each Nostril route once daily.    lisinopriL (PRINIVIL,ZESTRIL) 20 MG tablet Take 1 tablet (20 mg total) by mouth once daily.   Last reviewed on  3/13/2023  8:42 AM by Beto Bragg MD    Review of patient's allergies indicates:  No Known Allergies Last reviewed on  3/13/2023 8:42 AM by Beto Bragg      Tasks added this encounter   12/24/2023 - Clinical - Follow Up Assesement (Annual)  4/23/2023 - Refill Call (Auto Added)  3/29/2023 - Pickup Reminder   Tasks due within next 3 months   No tasks due.     Lucina Ortiz, PharmD  [facility] - Special[facility] Pharm[facility]  [address] [place] [address]  Phone: [phone]  Fax: [phone]

## 2023-03-24 NOTE — TELEPHONE ENCOUNTER
Specialty Pharmacy - Clinical Reassessment      Patient Diagnosis   B20 - Human immunodeficiency virus (HIV) disease    Francisco May II is a 42 y.o. male, who is followed by the specialty pharmacy service for management and education of his HIV regimen.  He has been on therapy with Biktarvy since 03/2021.  I have reviewed his electronic medical record and current medication list and determined that specialty medication adjustment Is not needed at this time.    Patient has not experienced adverse events.  He Is adherent reporting 1 missed doses since last review.  Adherence has been encouraged with the following mechanism(s): monthly refill calls.  He is meeting goals of therapy and will continue treatment. Renal and hepatic function appropriate for medication continuation. CD4 count 933 cell/uL (2/22/23). HIV RNA VL undetectable (2/22/23).         2/20/2023 1/24/2023 12/27/2022 11/25/2022 10/18/2022 9/13/2022 8/8/2022   Follow Up Review   # of missed doses 0 0 0 0 0 0 1   Reason       Busy with other things   New Medications? No No No No Yes No No   New Conditions? No No No No No No No   New Allergies? No No No No No No No   Med Effective? Good Good Good Good Very good Good Very good   Urgent Care? No No Yes No Yes No No   Requested Pharmacist? No No No No No No No            Therapy is appropriate to continue.    Therapy is effective: Yes  On scale of 1 to 10, how does patient rank quality of life? (10 - Best): 9  Recommendations: none at this time.  Review Method: Patient Contact    Tasks added this encounter   12/24/2023 - Clinical - Follow Up Assesement (Annual)   Tasks due within next 3 months   3/24/2023 - Refill Call (Auto Added)     Lucina Ortiz, PharmD  [facility]  [address] [facility] [address]  Phone: [phone]  Fax: [phone]

## 2023-04-25 ENCOUNTER — PATIENT MESSAGE (OUTPATIENT)
Dept: PHARMACY | Facility: CLINIC | Age: 43
End: 2023-04-25
Payer: COMMERCIAL

## 2023-05-04 ENCOUNTER — SPECIALTY PHARMACY (OUTPATIENT)
Dept: PHARMACY | Facility: CLINIC | Age: 43
End: 2023-05-04
Payer: COMMERCIAL

## 2023-05-04 PROBLEM — B20 HUMAN IMMUNODEFICIENCY VIRUS (HIV) DISEASE: Status: ACTIVE | Noted: 2023-05-04

## 2023-05-04 NOTE — TELEPHONE ENCOUNTER
Specialty Pharmacy - Clinical Intervention  Specialty Pharmacy - Refill Coordination    Specialty Medication Orders Linked to Encounter      Flowsheet Row Most Recent Value   Medication #1 ppurpkurn-oerqdmbu-kljupob ala (BIKTARVY) -25 mg (25 kg or greater) (Order#[id number], Rx#[id number])          Refill Questions - Documented Responses      Flowsheet Row Most Recent Value   Patient Availability and HIPAA Verification    Does patient want to proceed with activity? Unable to Reach          We have had multiple attempts to the patient and have been unsuccessful to reach the patient. We will stop reaching out to the patient but in the event that the patient needs the med and contacts us, we will communicate and begin dispensing for the patient. At your next visit with the patient, please review the importance of being in contact with our specialty pharmacy as a part of our care team.    Attempted to contact patient for Biktarvy rfill on 4/25, 4/28, and 5/1 with no success. Mychart sent 4/25/23. [facility] last dispensed 30 DS on 3/28/23. Re-enroll patient in refill management at call back.     Interventions added this encounter   Closed: [facility] Provider Intervention - Drug therapy adherence: ngnkdytfg-sdeslzlg-opjwtvy ala (BIKTARVY) -25 mg (25 kg or greater)     Lucina Ortiz, PharmD  [facility] - [facility]  [address] [place] [id number]  Phone: [phone]  Fax: [phone]

## 2023-05-05 ENCOUNTER — PATIENT MESSAGE (OUTPATIENT)
Dept: INFECTIOUS DISEASES | Facility: CLINIC | Age: 43
End: 2023-05-05
Payer: COMMERCIAL

## 2023-05-09 ENCOUNTER — SPECIALTY PHARMACY (OUTPATIENT)
Dept: PHARMACY | Facility: CLINIC | Age: 43
End: 2023-05-09
Payer: COMMERCIAL

## 2023-05-09 NOTE — TELEPHONE ENCOUNTER
Specialty Pharmacy - Refill Coordination    Specialty Medication Orders Linked to Encounter      Flowsheet Row Most Recent Value   Medication #1 rfxfftlkx-pudheies-rnlemis ala (BIKTARVY) -25 mg (25 kg or greater) (Order#[id number], Rx#[id number])            Refill Questions - Documented Responses      Flowsheet Row Most Recent Value   Refill Screening Questions    Changes to allergies? No   Changes to medications? No   New conditions since last clinic visit? No   Unplanned office visit, urgent care, ED, or hospital admission in the last 4 weeks? No   How does patient/caregiver feel medication is working? Good   Financial problems or insurance changes? No   How many doses of your specialty medications were missed in the last 4 weeks? 0   Would patient like to speak to a pharmacist? No   When does the patient need to receive the medication? 05/16/23   Refill Delivery Questions    How will the patient receive the medication? MEDRx   When does the patient need to receive the medication? 05/16/23   Shipping Address Home   Address in [facility] confirmed and updated if neccessary? Yes   Expected Copay ($) 0   Is the patient able to afford the medication copay? Yes   Payment Method zero copay   Days supply of Refill 30   Supplies needed? No supplies needed   Refill activity completed? Yes   Refill activity plan Refill scheduled   Shipment/Pickup Date: 05/10/23            Current Outpatient Medications   Medication Sig    rngzmxyij-sewrgybc-awslahj ala (BIKTARVY) -25 mg (25 kg or greater) Take 1 tablet by mouth once daily.    cetirizine (ZYRTEC) 10 MG tablet Take 1 tablet (10 mg total) by mouth once daily. (Patient not taking: Reported on 2/22/2023)    ergocalciferol (ERGOCALCIFEROL) 50,000 unit Cap Take 1 capsule (50,000 Units total) by mouth every 7 days. for 24 doses    fluticasone propionate (FLONASE) 50 mcg/actuation nasal spray 2 sprays (100 mcg total) by Each Nostril route once daily.    lisinopriL  (PRINIVIL,ZESTRIL) 20 MG tablet Take 1 tablet (20 mg total) by mouth once daily.   Last reviewed on 3/13/2023  8:42 AM by Beto Bragg MD    Review of patient's allergies indicates:  No Known Allergies Last reviewed on  3/13/2023 8:42 AM by Beto Bragg      Tasks added this encounter   No tasks added.   Tasks due within next 3 months   5/4/2023 - Refill Coordination Outreach (1 time occurrence)     KJ HOFFMAN, PharmD  [facility] - Specialty Pharmacy  [address] [place] [id number]  Phone: [phone]  Fax: [phone]

## 2023-06-02 DIAGNOSIS — B20 HUMAN IMMUNODEFICIENCY VIRUS (HIV) DISEASE: ICD-10-CM

## 2023-06-02 RX ORDER — BICTEGRAVIR SODIUM, EMTRICITABINE, AND TENOFOVIR ALAFENAMIDE FUMARATE 50; 200; 25 MG/1; MG/1; MG/1
1 TABLET ORAL DAILY
Qty: 30 TABLET | Refills: 2 | Status: ACTIVE | OUTPATIENT
Start: 2023-06-02 | End: 2023-09-08 | Stop reason: SDUPTHER

## 2023-06-05 ENCOUNTER — PATIENT MESSAGE (OUTPATIENT)
Dept: PHARMACY | Facility: CLINIC | Age: 43
End: 2023-06-05
Payer: COMMERCIAL

## 2023-06-08 ENCOUNTER — SPECIALTY PHARMACY (OUTPATIENT)
Dept: PHARMACY | Facility: CLINIC | Age: 43
End: 2023-06-08
Payer: COMMERCIAL

## 2023-06-08 NOTE — TELEPHONE ENCOUNTER
Specialty Pharmacy - Refill Coordination    Specialty Medication Orders Linked to Encounter      Flowsheet Row Most Recent Value   Medication #1 kcsszyuxj-sjzjrang-japczwd ala (BIKTARVY) -25 mg (25 kg or greater) (Order#[id number], Rx#[id number])            Refill Questions - Documented Responses      Flowsheet Row Most Recent Value   Patient Availability and HIPAA Verification    Does patient want to proceed with activity? Yes   HIPAA/medical authority confirmed? Yes   Relationship to patient of person spoken to? Self   Refill Screening Questions    Changes to allergies? No   Changes to medications? No   New conditions since last clinic visit? No   Unplanned office visit, urgent care, ED, or hospital admission in the last 4 weeks? No   How does patient/caregiver feel medication is working? Good   Financial problems or insurance changes? No   How many doses of your specialty medications were missed in the last 4 weeks? 0   When does the patient need to receive the medication? 06/10/23   Refill Delivery Questions    How will the patient receive the medication? Pickup   When does the patient need to receive the medication? 06/10/23   Expected Copay ($) 0   Is the patient able to afford the medication copay? Yes   Payment Method zero copay   Days supply of Refill 30   Supplies needed? No supplies needed   Refill activity completed? Yes   Refill activity plan Refill scheduled   Shipment/Pickup Date: 06/08/23            Current Outpatient Medications   Medication Sig    ilamdarfh-eypecozt-uotbmgr ala (BIKTARVY) -25 mg (25 kg or greater) Take 1 tablet by mouth once daily.    cetirizine (ZYRTEC) 10 MG tablet Take 1 tablet (10 mg total) by mouth once daily. (Patient not taking: Reported on 2/22/2023)    ergocalciferol (ERGOCALCIFEROL) 50,000 unit Cap Take 1 capsule (50,000 Units total) by mouth every 7 days. for 24 doses    lisinopriL (PRINIVIL,ZESTRIL) 20 MG tablet Take 1 tablet (20 mg total) by mouth once  daily.   Last reviewed on 3/13/2023  8:42 AM by Beto Bragg MD    Review of patient's allergies indicates:  No Known Allergies Last reviewed on  3/13/2023 8:42 AM by Beto Bragg      Tasks added this encounter   6/9/2023 - Pickup Reminder   Tasks due within next 3 months   No tasks due.     Lucina Ortiz, PharmD  [facility]  [address] [place] [id number]  Phone: [phone]  Fax: [phone]

## 2023-06-11 ENCOUNTER — PATIENT MESSAGE (OUTPATIENT)
Dept: INFECTIOUS DISEASES | Facility: CLINIC | Age: 43
End: 2023-06-11
Payer: COMMERCIAL

## 2023-06-20 ENCOUNTER — TELEPHONE (OUTPATIENT)
Dept: INFECTIOUS DISEASES | Facility: CLINIC | Age: 43
End: 2023-06-20
Payer: COMMERCIAL

## 2023-06-20 DIAGNOSIS — B35.1 ONYCHOMYCOSIS: Primary | ICD-10-CM

## 2023-06-20 RX ORDER — TERBINAFINE HYDROCHLORIDE 250 MG/1
250 TABLET ORAL DAILY
Qty: 42 TABLET | Refills: 1 | Status: SHIPPED | OUTPATIENT
Start: 2023-06-20 | End: 2023-09-01 | Stop reason: SDUPTHER

## 2023-07-03 ENCOUNTER — SPECIALTY PHARMACY (OUTPATIENT)
Dept: PHARMACY | Facility: CLINIC | Age: 43
End: 2023-07-03
Payer: COMMERCIAL

## 2023-07-03 NOTE — TELEPHONE ENCOUNTER
Specialty Pharmacy - Refill Coordination    Specialty Medication Orders Linked to Encounter      Flowsheet Row Most Recent Value   Medication #1 zohingfvy-obmtgyeg-rvswthl ala (BIKTARVY) -25 mg (25 kg or greater) (Order#[id number], Rx#[id number])            Refill Questions - Documented Responses      Flowsheet Row Most Recent Value   Patient Availability and HIPAA Verification    Does patient want to proceed with activity? Yes   HIPAA/medical authority confirmed? Yes   Relationship to patient of person spoken to? Self   Refill Screening Questions    Changes to allergies? No   Changes to medications? No   New conditions since last clinic visit? No   Unplanned office visit, urgent care, ED, or hospital admission in the last 4 weeks? No   How does patient/caregiver feel medication is working? Good   Financial problems or insurance changes? No   How many doses of your specialty medications were missed in the last 4 weeks? 0   Would patient like to speak to a pharmacist? No   When does the patient need to receive the medication? 07/10/23   Refill Delivery Questions    How will the patient receive the medication? Pickup   When does the patient need to receive the medication? 07/10/23   Expected Copay ($) 0   Is the patient able to afford the medication copay? Yes   Payment Method zero copay   Days supply of Refill 30   Supplies needed? No supplies needed   Refill activity completed? Yes   Refill activity plan Refill scheduled   Shipment/Pickup Date: 07/07/23            Current Outpatient Medications   Medication Sig    ipizsdvur-jpdipmjo-yvmafjf ala (BIKTARVY) -25 mg (25 kg or greater) Take 1 tablet by mouth once daily.    cetirizine (ZYRTEC) 10 MG tablet Take 1 tablet (10 mg total) by mouth once daily. (Patient not taking: Reported on 2/22/2023)    ergocalciferol (ERGOCALCIFEROL) 50,000 unit Cap Take 1 capsule (50,000 Units total) by mouth every 7 days. for 24 doses    lisinopriL (PRINIVIL,ZESTRIL) 20 MG  tablet Take 1 tablet (20 mg total) by mouth once daily.    terbinafine HCL (LAMISIL) 250 mg tablet Take 1 tablet (250 mg total) by mouth once daily.   Last reviewed on 6/20/2023  5:57 PM by Beto Bragg MD    Review of patient's allergies indicates:  No Known Allergies Last reviewed on  6/20/2023 5:57 PM by Beto Bragg      Tasks added this encounter   7/8/2023 - Pickup Reminder   Tasks due within next 3 months   No tasks due.     Mahamed Pedraza  [facility] - Specialty Pharm[facility]  [address] [facility] [address]  Phone: [phone]  Fax: [phone]

## 2023-07-28 ENCOUNTER — LAB VISIT (OUTPATIENT)
Dept: LAB | Facility: HOSPITAL | Age: 43
End: 2023-07-28
Attending: INTERNAL MEDICINE
Payer: COMMERCIAL

## 2023-07-28 DIAGNOSIS — B35.1 ONYCHOMYCOSIS: ICD-10-CM

## 2023-07-28 LAB
ALBUMIN SERPL BCP-MCNC: 3.9 G/DL (ref 3.5–5.2)
ALP SERPL-CCNC: 77 U/L (ref 55–135)
ALT SERPL W/O P-5'-P-CCNC: 22 U/L (ref 10–44)
ANION GAP SERPL CALC-SCNC: 8 MMOL/L (ref 8–16)
AST SERPL-CCNC: 20 U/L (ref 10–40)
BILIRUB SERPL-MCNC: 0.8 MG/DL (ref 0.1–1)
BUN SERPL-MCNC: 15 MG/DL (ref 6–20)
CALCIUM SERPL-MCNC: 9.2 MG/DL (ref 8.7–10.5)
CHLORIDE SERPL-SCNC: 102 MMOL/L (ref 95–110)
CO2 SERPL-SCNC: 28 MMOL/L (ref 23–29)
CREAT SERPL-MCNC: 1.1 MG/DL (ref 0.5–1.4)
EST. GFR  (NO RACE VARIABLE): >60 ML/MIN/1.73 M^2
GLUCOSE SERPL-MCNC: 119 MG/DL (ref 70–110)
POTASSIUM SERPL-SCNC: 4.3 MMOL/L (ref 3.5–5.1)
PROT SERPL-MCNC: 7.2 G/DL (ref 6–8.4)
SODIUM SERPL-SCNC: 138 MMOL/L (ref 136–145)

## 2023-07-28 PROCEDURE — 80053 COMPREHEN METABOLIC PANEL: CPT | Performed by: INTERNAL MEDICINE

## 2023-07-28 PROCEDURE — 36415 COLL VENOUS BLD VENIPUNCTURE: CPT | Performed by: INTERNAL MEDICINE

## 2023-07-30 ENCOUNTER — PATIENT MESSAGE (OUTPATIENT)
Dept: ADMINISTRATIVE | Facility: OTHER | Age: 43
End: 2023-07-30
Payer: COMMERCIAL

## 2023-07-31 ENCOUNTER — PATIENT MESSAGE (OUTPATIENT)
Dept: INFECTIOUS DISEASES | Facility: CLINIC | Age: 43
End: 2023-07-31
Payer: COMMERCIAL

## 2023-08-21 ENCOUNTER — PATIENT MESSAGE (OUTPATIENT)
Dept: INFECTIOUS DISEASES | Facility: CLINIC | Age: 43
End: 2023-08-21
Payer: COMMERCIAL

## 2023-08-22 ENCOUNTER — TELEPHONE (OUTPATIENT)
Dept: INFECTIOUS DISEASES | Facility: CLINIC | Age: 43
End: 2023-08-22
Payer: COMMERCIAL

## 2023-08-22 DIAGNOSIS — B20 HUMAN IMMUNODEFICIENCY VIRUS (HIV) DISEASE: Primary | ICD-10-CM

## 2023-08-22 NOTE — TELEPHONE ENCOUNTER
Schedule labs. I can see him in the morning during a week I am on hospital.  But not on Wednesday or Friday.

## 2023-08-23 ENCOUNTER — TELEPHONE (OUTPATIENT)
Dept: INFECTIOUS DISEASES | Facility: CLINIC | Age: 43
End: 2023-08-23
Payer: COMMERCIAL

## 2023-08-23 NOTE — TELEPHONE ENCOUNTER
----- Message from Linda Bah MA sent at 8/22/2023  4:24 PM CDT -----  Schedule labs. I can see him in the morning during a week I am on hospital.  But not on Wednesday or Friday.

## 2023-08-24 ENCOUNTER — LAB VISIT (OUTPATIENT)
Dept: LAB | Facility: HOSPITAL | Age: 43
End: 2023-08-24
Attending: INTERNAL MEDICINE
Payer: COMMERCIAL

## 2023-08-24 DIAGNOSIS — B20 HUMAN IMMUNODEFICIENCY VIRUS (HIV) DISEASE: ICD-10-CM

## 2023-08-24 LAB
ALBUMIN SERPL BCP-MCNC: 3.8 G/DL (ref 3.5–5.2)
ALP SERPL-CCNC: 69 U/L (ref 55–135)
ALT SERPL W/O P-5'-P-CCNC: 21 U/L (ref 10–44)
ANION GAP SERPL CALC-SCNC: 7 MMOL/L (ref 8–16)
AST SERPL-CCNC: 17 U/L (ref 10–40)
BASOPHILS # BLD AUTO: 0.08 K/UL (ref 0–0.2)
BASOPHILS NFR BLD: 1.2 % (ref 0–1.9)
BILIRUB SERPL-MCNC: 0.6 MG/DL (ref 0.1–1)
BUN SERPL-MCNC: 16 MG/DL (ref 6–20)
CALCIUM SERPL-MCNC: 9.4 MG/DL (ref 8.7–10.5)
CD3+CD4+ CELLS # BLD: 763 CELLS/UL (ref 300–1400)
CD3+CD4+ CELLS NFR BLD: 33.6 % (ref 28–57)
CHLORIDE SERPL-SCNC: 103 MMOL/L (ref 95–110)
CO2 SERPL-SCNC: 29 MMOL/L (ref 23–29)
CREAT SERPL-MCNC: 1.2 MG/DL (ref 0.5–1.4)
DIFFERENTIAL METHOD: NORMAL
EOSINOPHIL # BLD AUTO: 0.2 K/UL (ref 0–0.5)
EOSINOPHIL NFR BLD: 3 % (ref 0–8)
ERYTHROCYTE [DISTWIDTH] IN BLOOD BY AUTOMATED COUNT: 12 % (ref 11.5–14.5)
EST. GFR  (NO RACE VARIABLE): >60 ML/MIN/1.73 M^2
GLUCOSE SERPL-MCNC: 98 MG/DL (ref 70–110)
HCT VFR BLD AUTO: 46.4 % (ref 40–54)
HCV AB SERPL QL IA: NORMAL
HGB BLD-MCNC: 15.3 G/DL (ref 14–18)
IMM GRANULOCYTES # BLD AUTO: 0.03 K/UL (ref 0–0.04)
IMM GRANULOCYTES NFR BLD AUTO: 0.5 % (ref 0–0.5)
LYMPHOCYTES # BLD AUTO: 2.1 K/UL (ref 1–4.8)
LYMPHOCYTES NFR BLD: 31.9 % (ref 18–48)
MCH RBC QN AUTO: 29.8 PG (ref 27–31)
MCHC RBC AUTO-ENTMCNC: 33 G/DL (ref 32–36)
MCV RBC AUTO: 90 FL (ref 82–98)
MONOCYTES # BLD AUTO: 0.5 K/UL (ref 0.3–1)
MONOCYTES NFR BLD: 6.8 % (ref 4–15)
NEUTROPHILS # BLD AUTO: 3.8 K/UL (ref 1.8–7.7)
NEUTROPHILS NFR BLD: 56.6 % (ref 38–73)
NRBC BLD-RTO: 0 /100 WBC
PLATELET # BLD AUTO: 366 K/UL (ref 150–450)
PMV BLD AUTO: 9.6 FL (ref 9.2–12.9)
POTASSIUM SERPL-SCNC: 4.7 MMOL/L (ref 3.5–5.1)
PROT SERPL-MCNC: 7.2 G/DL (ref 6–8.4)
RBC # BLD AUTO: 5.13 M/UL (ref 4.6–6.2)
SODIUM SERPL-SCNC: 139 MMOL/L (ref 136–145)
WBC # BLD AUTO: 6.64 K/UL (ref 3.9–12.7)

## 2023-08-24 PROCEDURE — 80053 COMPREHEN METABOLIC PANEL: CPT | Performed by: INTERNAL MEDICINE

## 2023-08-24 PROCEDURE — 86361 T CELL ABSOLUTE COUNT: CPT | Performed by: INTERNAL MEDICINE

## 2023-08-24 PROCEDURE — 86593 SYPHILIS TEST NON-TREP QUANT: CPT | Performed by: INTERNAL MEDICINE

## 2023-08-24 PROCEDURE — 36415 COLL VENOUS BLD VENIPUNCTURE: CPT | Performed by: INTERNAL MEDICINE

## 2023-08-24 PROCEDURE — 87536 HIV-1 QUANT&REVRSE TRNSCRPJ: CPT | Performed by: INTERNAL MEDICINE

## 2023-08-24 PROCEDURE — 86803 HEPATITIS C AB TEST: CPT | Performed by: INTERNAL MEDICINE

## 2023-08-24 PROCEDURE — 85025 COMPLETE CBC W/AUTO DIFF WBC: CPT | Performed by: INTERNAL MEDICINE

## 2023-08-24 PROCEDURE — 86780 TREPONEMA PALLIDUM: CPT | Performed by: INTERNAL MEDICINE

## 2023-08-24 PROCEDURE — 86592 SYPHILIS TEST NON-TREP QUAL: CPT | Performed by: INTERNAL MEDICINE

## 2023-08-25 LAB
HIV1 RNA # SERPL NAA+PROBE: <20 COPIES/ML
HIV1 RNA SERPL NAA+PROBE-LOG#: <1.3 LOG COPIES/ML
HIV1 RNA SERPL QL NAA+PROBE: DETECTED
RPR SER QL: REACTIVE
RPR SER-TITR: ABNORMAL {TITER}

## 2023-08-28 LAB — T PALLIDUM AB SER QL IF: REACTIVE

## 2023-09-01 DIAGNOSIS — B35.1 ONYCHOMYCOSIS: ICD-10-CM

## 2023-09-01 RX ORDER — TERBINAFINE HYDROCHLORIDE 250 MG/1
250 TABLET ORAL DAILY
Qty: 42 TABLET | Refills: 0 | Status: SHIPPED | OUTPATIENT
Start: 2023-09-01 | End: 2023-10-05 | Stop reason: SDUPTHER

## 2023-09-05 ENCOUNTER — OFFICE VISIT (OUTPATIENT)
Dept: INFECTIOUS DISEASES | Facility: CLINIC | Age: 43
End: 2023-09-05
Payer: COMMERCIAL

## 2023-09-05 VITALS
BODY MASS INDEX: 37.97 KG/M2 | HEART RATE: 105 BPM | WEIGHT: 271.19 LBS | DIASTOLIC BLOOD PRESSURE: 78 MMHG | TEMPERATURE: 99 F | HEIGHT: 71 IN | SYSTOLIC BLOOD PRESSURE: 145 MMHG

## 2023-09-05 DIAGNOSIS — R73.9 HYPERGLYCEMIA: ICD-10-CM

## 2023-09-05 DIAGNOSIS — B20 HUMAN IMMUNODEFICIENCY VIRUS (HIV) DISEASE: Primary | ICD-10-CM

## 2023-09-05 DIAGNOSIS — I10 ESSENTIAL HYPERTENSION: ICD-10-CM

## 2023-09-05 DIAGNOSIS — B35.1 ONYCHOMYCOSIS: ICD-10-CM

## 2023-09-05 DIAGNOSIS — E66.9 OBESITY (BMI 35.0-39.9 WITHOUT COMORBIDITY): ICD-10-CM

## 2023-09-05 PROCEDURE — 99215 OFFICE O/P EST HI 40 MIN: CPT | Mod: S$GLB,,, | Performed by: INTERNAL MEDICINE

## 2023-09-05 PROCEDURE — 3078F PR MOST RECENT DIASTOLIC BLOOD PRESSURE < 80 MM HG: ICD-10-PCS | Mod: CPTII,S$GLB,, | Performed by: INTERNAL MEDICINE

## 2023-09-05 PROCEDURE — 99999 PR PBB SHADOW E&M-EST. PATIENT-LVL IV: ICD-10-PCS | Mod: PBBFAC,,, | Performed by: INTERNAL MEDICINE

## 2023-09-05 PROCEDURE — 1160F PR REVIEW ALL MEDS BY PRESCRIBER/CLIN PHARMACIST DOCUMENTED: ICD-10-PCS | Mod: CPTII,S$GLB,, | Performed by: INTERNAL MEDICINE

## 2023-09-05 PROCEDURE — 1160F RVW MEDS BY RX/DR IN RCRD: CPT | Mod: CPTII,S$GLB,, | Performed by: INTERNAL MEDICINE

## 2023-09-05 PROCEDURE — 99215 PR OFFICE/OUTPT VISIT, EST, LEVL V, 40-54 MIN: ICD-10-PCS | Mod: S$GLB,,, | Performed by: INTERNAL MEDICINE

## 2023-09-05 PROCEDURE — 4010F PR ACE/ARB THEARPY RXD/TAKEN: ICD-10-PCS | Mod: CPTII,S$GLB,, | Performed by: INTERNAL MEDICINE

## 2023-09-05 PROCEDURE — 3078F DIAST BP <80 MM HG: CPT | Mod: CPTII,S$GLB,, | Performed by: INTERNAL MEDICINE

## 2023-09-05 PROCEDURE — 3077F PR MOST RECENT SYSTOLIC BLOOD PRESSURE >= 140 MM HG: ICD-10-PCS | Mod: CPTII,S$GLB,, | Performed by: INTERNAL MEDICINE

## 2023-09-05 PROCEDURE — 3044F HG A1C LEVEL LT 7.0%: CPT | Mod: CPTII,S$GLB,, | Performed by: INTERNAL MEDICINE

## 2023-09-05 PROCEDURE — 3008F PR BODY MASS INDEX (BMI) DOCUMENTED: ICD-10-PCS | Mod: CPTII,S$GLB,, | Performed by: INTERNAL MEDICINE

## 2023-09-05 PROCEDURE — 3044F PR MOST RECENT HEMOGLOBIN A1C LEVEL <7.0%: ICD-10-PCS | Mod: CPTII,S$GLB,, | Performed by: INTERNAL MEDICINE

## 2023-09-05 PROCEDURE — 4010F ACE/ARB THERAPY RXD/TAKEN: CPT | Mod: CPTII,S$GLB,, | Performed by: INTERNAL MEDICINE

## 2023-09-05 PROCEDURE — 1159F PR MEDICATION LIST DOCUMENTED IN MEDICAL RECORD: ICD-10-PCS | Mod: CPTII,S$GLB,, | Performed by: INTERNAL MEDICINE

## 2023-09-05 PROCEDURE — 3008F BODY MASS INDEX DOCD: CPT | Mod: CPTII,S$GLB,, | Performed by: INTERNAL MEDICINE

## 2023-09-05 PROCEDURE — 1159F MED LIST DOCD IN RCRD: CPT | Mod: CPTII,S$GLB,, | Performed by: INTERNAL MEDICINE

## 2023-09-05 PROCEDURE — 99999 PR PBB SHADOW E&M-EST. PATIENT-LVL IV: CPT | Mod: PBBFAC,,, | Performed by: INTERNAL MEDICINE

## 2023-09-05 PROCEDURE — 3077F SYST BP >= 140 MM HG: CPT | Mod: CPTII,S$GLB,, | Performed by: INTERNAL MEDICINE

## 2023-09-05 NOTE — PROGRESS NOTES
Subjective:      Patient ID: Francisco May II is a 42 y.o. male.    Chief Complaint:Follow-up and HIV Positive/AIDS      History of Present Illness      41 year old male with HIV.  He is here today for follow up.       Interval History  -Rides his bike more than peloton.  Does between 6-8 miles.  Uses peloton when it is raining.  Has lost 18 lbs.    -Has done lot of travel to [place].  Potentially going to [place] in November.  Potentially going to a conference in [facility] in December.  Works during his vacations so he doesn't feel relaxed.    -Still having work stress.  -Wants to hold off on influenza vaccination.  Says he gets sick.    Medications  Biktarvy  Lisinopril  Zyrtec (as needed for sinus problems)  Terbinafine        Medical History  HTN  Hypotestosteronism  Depression.  VITOR - uses cpap machine  DM  HIV  Syphilis  Gonorrhea  Chlamydia        Surgical History  None     Family history  Stroke  HTN  DM       Family History  Father had stroke.        Social History  Visited [place] X 2 weeks.  Visited [facility].  Just bought a house in [place].  Brother will be moving there. Sister lives in [place]. Maternal grandparents are still living and independent.  Parents living.  He is still single.  Smokes occasional weed.  Has a drink every other days.  No illicit substances.  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  Started the business in January 2020. Recently moved and now lives in the [facility] residences.    Review of Systems   All other systems reviewed and are negative.    Objective:   Physical Exam  Vitals and nursing note reviewed.   Constitutional:       General: He is not in acute distress.     Appearance: He is well-developed. He is not diaphoretic.   HENT:      Head: Normocephalic and atraumatic.      Right Ear: External ear normal.      Left Ear: External ear normal.      Nose: Nose normal.      Mouth/Throat:       Pharynx: No oropharyngeal exudate.   Eyes:      General: No scleral icterus.        Right eye: No discharge.         Left eye: No discharge.      Conjunctiva/sclera: Conjunctivae normal.      Pupils: Pupils are equal, round, and reactive to light.   Neck:      Thyroid: No thyromegaly.      Vascular: No JVD.      Trachea: No tracheal deviation.   Cardiovascular:      Rate and Rhythm: Normal rate and regular rhythm.      Heart sounds: No murmur heard.     No friction rub. No gallop.   Pulmonary:      Effort: Pulmonary effort is normal. No respiratory distress.      Breath sounds: Normal breath sounds. No stridor. No wheezing or rales.   Chest:      Chest wall: No tenderness.   Abdominal:      General: Bowel sounds are normal. There is no distension.      Palpations: Abdomen is soft. There is no mass.      Tenderness: There is no abdominal tenderness. There is no guarding or rebound.   Musculoskeletal:         General: No tenderness. Normal range of motion.      Cervical back: Normal range of motion and neck supple.   Lymphadenopathy:      Cervical: No cervical adenopathy.   Skin:     General: Skin is warm.      Coloration: Skin is not pale.      Findings: No erythema or rash.   Neurological:      Mental Status: He is alert and oriented to person, place, and time.      Cranial Nerves: No cranial nerve deficit.      Motor: No abnormal muscle tone.      Coordination: Coordination normal.      Deep Tendon Reflexes: Reflexes are normal and symmetric. Reflexes normal.   Psychiatric:         Behavior: Behavior normal.         Thought Content: Thought content normal.         Judgment: Judgment normal.       Assessment:     1. Human immunodeficiency virus (HIV) disease :  Labs reviewed with the patient.  Viral load is below 20.  He is to continue on biktarvy.   2. Essential hypertension :  BP is slightly elevated today.  Continue on lisinopril.  Continue with exercise.   3. Onychomycosis :  He is currently on terbinafine.   4.  Obesity (BMI 35.0-39.9 without comorbidity) :  Currently exercising with peloton.  He is to continue with his current weight loss plan.   5. Hyperglycemia :  Fasting blood glucose is 98.  Will continue to monitor.       Plan:      Human immunodeficiency virus (HIV) disease   - biktarvy    Essential hypertension   - lisinopril    Onychomycosis   - terbinafine    Obesity (BMI 35.0-39.9 without comorbidity)   - as outlined above    Hyperglycemia   - monitor with labs

## 2023-09-08 DIAGNOSIS — B20 HUMAN IMMUNODEFICIENCY VIRUS (HIV) DISEASE: ICD-10-CM

## 2023-09-08 RX ORDER — BICTEGRAVIR SODIUM, EMTRICITABINE, AND TENOFOVIR ALAFENAMIDE FUMARATE 50; 200; 25 MG/1; MG/1; MG/1
1 TABLET ORAL DAILY
Qty: 30 TABLET | Refills: 2 | Status: ACTIVE | OUTPATIENT
Start: 2023-09-08 | End: 2023-12-22 | Stop reason: SDUPTHER

## 2023-09-14 ENCOUNTER — TELEPHONE (OUTPATIENT)
Dept: INFECTIOUS DISEASES | Facility: CLINIC | Age: 43
End: 2023-09-14
Payer: COMMERCIAL

## 2023-09-14 ENCOUNTER — PATIENT MESSAGE (OUTPATIENT)
Dept: INFECTIOUS DISEASES | Facility: CLINIC | Age: 43
End: 2023-09-14
Payer: COMMERCIAL

## 2023-09-14 NOTE — TELEPHONE ENCOUNTER
Called pt, no answer. Left voicemail to give the office a call back.  Message sent to pt via pt [facility] for response.

## 2023-09-14 NOTE — TELEPHONE ENCOUNTER
----- Message from Beto Bragg MD sent at 9/14/2023  4:01 PM CDT -----  Linda, Please notify patient.    ----- Message -----  From: Lucina Ortiz, PharmD  Sent: 9/14/2023  10:38 AM CDT  To: Beto Bragg MD    FYI - Unable to contact patient for Biktarvy refill.     Thanks,   Lucina Ortiz, Pharm D  [facility]   [phone]

## 2023-10-05 ENCOUNTER — PATIENT MESSAGE (OUTPATIENT)
Dept: SLEEP MEDICINE | Facility: CLINIC | Age: 43
End: 2023-10-05
Payer: COMMERCIAL

## 2023-10-05 DIAGNOSIS — G47.33 OSA (OBSTRUCTIVE SLEEP APNEA): Primary | ICD-10-CM

## 2023-10-24 DIAGNOSIS — I10 ESSENTIAL HYPERTENSION: ICD-10-CM

## 2023-10-24 RX ORDER — LISINOPRIL 20 MG/1
20 TABLET ORAL DAILY
Qty: 30 TABLET | Refills: 11 | Status: SHIPPED | OUTPATIENT
Start: 2023-10-24 | End: 2024-02-25 | Stop reason: SDUPTHER

## 2023-12-22 DIAGNOSIS — B20 HUMAN IMMUNODEFICIENCY VIRUS (HIV) DISEASE: ICD-10-CM

## 2023-12-22 RX ORDER — BICTEGRAVIR SODIUM, EMTRICITABINE, AND TENOFOVIR ALAFENAMIDE FUMARATE 50; 200; 25 MG/1; MG/1; MG/1
1 TABLET ORAL DAILY
Qty: 30 TABLET | Refills: 2 | Status: ACTIVE | OUTPATIENT
Start: 2023-12-22 | End: 2024-05-03

## 2024-02-25 DIAGNOSIS — I10 ESSENTIAL HYPERTENSION: ICD-10-CM

## 2024-02-26 RX ORDER — LISINOPRIL 20 MG/1
20 TABLET ORAL DAILY
Qty: 30 TABLET | Refills: 11 | Status: SHIPPED | OUTPATIENT
Start: 2024-02-26 | End: 2025-02-25

## 2024-03-06 ENCOUNTER — CLINICAL SUPPORT (OUTPATIENT)
Dept: AUDIOLOGY | Facility: CLINIC | Age: 44
End: 2024-03-06
Payer: COMMERCIAL

## 2024-03-06 ENCOUNTER — OFFICE VISIT (OUTPATIENT)
Dept: OTOLARYNGOLOGY | Facility: CLINIC | Age: 44
End: 2024-03-06
Payer: COMMERCIAL

## 2024-03-06 VITALS — DIASTOLIC BLOOD PRESSURE: 85 MMHG | HEART RATE: 76 BPM | SYSTOLIC BLOOD PRESSURE: 135 MMHG

## 2024-03-06 DIAGNOSIS — H65.04 RECURRENT ACUTE SEROUS OTITIS MEDIA OF RIGHT EAR: Primary | ICD-10-CM

## 2024-03-06 DIAGNOSIS — H93.8X1 SENSATION OF FULLNESS IN RIGHT EAR: ICD-10-CM

## 2024-03-06 DIAGNOSIS — T16.1XXA ACUTE FOREIGN BODY OF RIGHT EAR CANAL, INITIAL ENCOUNTER: ICD-10-CM

## 2024-03-06 DIAGNOSIS — R09.81 NASAL CONGESTION: ICD-10-CM

## 2024-03-06 DIAGNOSIS — H90.A11 CONDUCTIVE HEARING LOSS OF RIGHT EAR WITH RESTRICTED HEARING OF LEFT EAR: Primary | ICD-10-CM

## 2024-03-06 DIAGNOSIS — H90.0 CONDUCTIVE HEARING LOSS, BILATERAL: ICD-10-CM

## 2024-03-06 PROCEDURE — 99999 PR PBB SHADOW E&M-EST. PATIENT-LVL III: CPT | Mod: PBBFAC,,, | Performed by: OTOLARYNGOLOGY

## 2024-03-06 PROCEDURE — 4010F ACE/ARB THERAPY RXD/TAKEN: CPT | Mod: CPTII,S$GLB,, | Performed by: OTOLARYNGOLOGY

## 2024-03-06 PROCEDURE — 3075F SYST BP GE 130 - 139MM HG: CPT | Mod: CPTII,S$GLB,, | Performed by: OTOLARYNGOLOGY

## 2024-03-06 PROCEDURE — 92557 COMPREHENSIVE HEARING TEST: CPT | Mod: S$GLB,,, | Performed by: AUDIOLOGIST

## 2024-03-06 PROCEDURE — 3079F DIAST BP 80-89 MM HG: CPT | Mod: CPTII,S$GLB,, | Performed by: OTOLARYNGOLOGY

## 2024-03-06 PROCEDURE — 69200 CLEAR OUTER EAR CANAL: CPT | Mod: RT,S$GLB,, | Performed by: OTOLARYNGOLOGY

## 2024-03-06 PROCEDURE — 92567 TYMPANOMETRY: CPT | Mod: S$GLB,,, | Performed by: AUDIOLOGIST

## 2024-03-06 PROCEDURE — 99999 PR PBB SHADOW E&M-EST. PATIENT-LVL I: CPT | Mod: PBBFAC,,, | Performed by: AUDIOLOGIST

## 2024-03-06 PROCEDURE — 1160F RVW MEDS BY RX/DR IN RCRD: CPT | Mod: CPTII,S$GLB,, | Performed by: OTOLARYNGOLOGY

## 2024-03-06 PROCEDURE — 99214 OFFICE O/P EST MOD 30 MIN: CPT | Mod: 25,S$GLB,, | Performed by: OTOLARYNGOLOGY

## 2024-03-06 PROCEDURE — 1159F MED LIST DOCD IN RCRD: CPT | Mod: CPTII,S$GLB,, | Performed by: OTOLARYNGOLOGY

## 2024-03-06 RX ORDER — FLUTICASONE PROPIONATE 50 MCG
2 SPRAY, SUSPENSION (ML) NASAL DAILY
Qty: 16 G | Refills: 2 | Status: SHIPPED | OUTPATIENT
Start: 2024-03-06 | End: 2024-09-02

## 2024-03-06 NOTE — PROGRESS NOTES
44 y/o male who returns with c/o right ear fullness which he noticed about 2-3 months ago. Thought his R PET may have come out. He never saw it this time. Hx of prior PET. This last one placed in 11/2022. Since fullness in right ear - feels nasal congestion. Bought some nasal decongestant spray. Has not used it a lot. Not using Flonase. Did ok with last PET placed in clinic by Dr. Phillip. Hearing down. Wears CPAP.     PMHx, PSHx, Meds, Allergies, SocHx, FamHx reviewed in EPIC    ROS:  Gen: no f/c  ENT: as above    PE: /85   Pulse 76    Gen: male, well nourished, well developed, NAD, cooperative, good historian  Ears: AD with green tube with cerumen around it in EAC (removed see below); TM with thomas and clear RONY With some air bubbles superiorly, bony landmarks normal;  AS EAC patent & TM translucent with normal bony landmarks (scar band on TM)   Nose: external nose wnl, nasal septum near midline anteriorly, inferior turbinates mild edema, clear mucus, no visible purulence or polyps  OC/OP:  MMM, thick tongue protrudes midline, palate raises symmetrically, bifid uvula, tonsils 2+ cryptic, no erythema or exudate  Neck: thick, supple, no TTP, no LAD or masses  Face:  no TTP, no erythema or flushing  Respiratory: Breathing comfortably without retractions  Skin: facial skin intact without visible lesions or flushing  Lymph: no neck lympadenopathy  Neuro:  facial movement symmetric, speech fluid, gait stable, tongue protrudes midline  Psych: alert & oriented x 3, reasonable, normal affect          Compared older audio to today. Mild CHL B - worse on right with flat tymp on right. SRT appropriate for hearing thresholds.    Procedure: Removal of foreign body (tube) using microsurgical instrumentation from ear canal  After explaining the procedure and obtaining verbal assent, the patient was comfortably positioned and right external auditory canal visualized with magnification. The FB was noted -  was removed with  microsurgical instrumentation, using angle hook to reposition and then forceps to remove to reveal patent external auditory canals.  Right canal(s) cleared. The patient tolerated this procedure well without complication.        Topical nasal decongestant applied to nose. After 5-10 - could autoinsufflate more easily and insufflated with air via nose for clearance of over 60% of fluid in middle ear on right side.     Impression:   1. Recurrent acute serous otitis media of right ear  fluticasone propionate (FLONASE) 50 mcg/actuation nasal spray      2. Sensation of fullness in right ear        3. Conductive hearing loss, bilateral - right > left        4. Nasal congestion  fluticasone propionate (FLONASE) 50 mcg/actuation nasal spray          Discussion and Plan:    Insufflated ear in clinic.    Use nasal spray decongestant (Afrin) - 2 sprays each nostril 2 times per day for 3 DAYS ONLY.  After applying he should gently try to pop his ears 2-3 times per day.     Restart Flonase 2 sprays each nostril daily. We discussed how to apply it appropriately by placing the spray nozzle inside the nostril pointing above the ear on the same side and gently breathing in through the nose (do NOT snort).    Follow up in 4-6 weeks. If fluid not clearing consider replacement of tympanostomy tube with same type versus T-tube (which may require placement in surgery).    Call or return for concerns.    Parts or all of this note were created by voice recognition software; typographical errors in translating may be present.

## 2024-03-06 NOTE — PROGRESS NOTES
Francisco May II, a 43 y.o. male, was seen today in the clinic for an audiologic evaluation.  Patient reported a history of ear infections and PE tubes. Patient reported he had a PE tube in his right ear that has extruded and hearing is muffled ever since. Patient denied tinnitus and vertigo. Right PE tube was visualized in the patient's ear canal.     Tympanometry revealed Type B in the right ear and Type A in the left ear. Audiogram results revealed mild to moderate conductive hearing loss in the right ear and mild lo frequency conductive hearing loss rising to normal hearing in the left ear.  Speech reception thresholds were noted at 40 dB in the right ear and 30 dB in the left ear.  Speech discrimination scores were 100% in the right ear and 100% in the left ear.    Recommendations:  Otologic evaluation  Repeat audiogram as needed  Hearing protection when in noise

## 2024-03-06 NOTE — PATIENT INSTRUCTIONS
Insufflated ear in clinic.    Use nasal spray decongestant (Afrin) - 2 sprays each nostril 2 times per day for 3 DAYS ONLY.  After applying he should gently try to pop his ears 2-3 times per day.     Restart Flonase 2 sprays each nostril daily. We discussed how to apply it appropriately by placing the spray nozzle inside the nostril pointing above the ear on the same side and gently breathing in through the nose (do NOT snort).    Follow up in 4-6 weeks. If fluid not clearing consider replacement of tympanostomy tube with same type versus T-tube (which may require placement in surgery).    Call or return for concerns.

## 2024-03-12 ENCOUNTER — OFFICE VISIT (OUTPATIENT)
Dept: SLEEP MEDICINE | Facility: CLINIC | Age: 44
End: 2024-03-12
Payer: COMMERCIAL

## 2024-03-12 VITALS — HEIGHT: 71 IN | WEIGHT: 271.19 LBS | BODY MASS INDEX: 37.97 KG/M2

## 2024-03-12 DIAGNOSIS — I10 ESSENTIAL HYPERTENSION: ICD-10-CM

## 2024-03-12 DIAGNOSIS — G47.33 OBSTRUCTIVE SLEEP APNEA: Primary | ICD-10-CM

## 2024-03-12 PROCEDURE — 4010F ACE/ARB THERAPY RXD/TAKEN: CPT | Mod: CPTII,S$GLB,, | Performed by: NURSE PRACTITIONER

## 2024-03-12 PROCEDURE — 1159F MED LIST DOCD IN RCRD: CPT | Mod: CPTII,S$GLB,, | Performed by: NURSE PRACTITIONER

## 2024-03-12 PROCEDURE — 99213 OFFICE O/P EST LOW 20 MIN: CPT | Mod: S$GLB,,, | Performed by: NURSE PRACTITIONER

## 2024-03-12 PROCEDURE — 3008F BODY MASS INDEX DOCD: CPT | Mod: CPTII,S$GLB,, | Performed by: NURSE PRACTITIONER

## 2024-03-12 PROCEDURE — 99999 PR PBB SHADOW E&M-EST. PATIENT-LVL III: CPT | Mod: PBBFAC,,, | Performed by: NURSE PRACTITIONER

## 2024-03-12 NOTE — PROGRESS NOTES
"C: VITOR      He uses cpap 15cm machine qhs faithfully. Got new machine since seen. Travels with REsmed mini/sometimes noisy but works. Sleeps well with therapy. Using FFM.  Interrogation- Airsense 10 76/90d used avg 6.2h/n AHI 3.8      Ht 5' 11" (1.803 m)   Wt 123 kg (271 lb 2.7 oz)   BMI 37.82 kg/m²     2010 psg N/A  2021 HST AHI 59    ASSESSMENT:   VITOR,  Adherent with CPAP, benefiting from therapy. AHI<5    PLAN:   1. Continue cpap 15cmTHS DME prn supplies   2. Discussed effectiveness of therapy  Rtc 2yrs, sooner if needed  "

## 2024-04-26 ENCOUNTER — OFFICE VISIT (OUTPATIENT)
Dept: INFECTIOUS DISEASES | Facility: CLINIC | Age: 44
End: 2024-04-26
Payer: COMMERCIAL

## 2024-04-26 ENCOUNTER — LAB VISIT (OUTPATIENT)
Dept: LAB | Facility: HOSPITAL | Age: 44
End: 2024-04-26
Attending: INTERNAL MEDICINE
Payer: COMMERCIAL

## 2024-04-26 VITALS
HEIGHT: 71 IN | SYSTOLIC BLOOD PRESSURE: 139 MMHG | BODY MASS INDEX: 36.51 KG/M2 | WEIGHT: 260.81 LBS | HEART RATE: 84 BPM | TEMPERATURE: 99 F | DIASTOLIC BLOOD PRESSURE: 76 MMHG

## 2024-04-26 DIAGNOSIS — E66.9 OBESITY (BMI 35.0-39.9 WITHOUT COMORBIDITY): ICD-10-CM

## 2024-04-26 DIAGNOSIS — E29.1 HYPOGONADISM IN MALE: ICD-10-CM

## 2024-04-26 DIAGNOSIS — B20 HUMAN IMMUNODEFICIENCY VIRUS (HIV) DISEASE: ICD-10-CM

## 2024-04-26 DIAGNOSIS — H65.411 CHRONIC ALLERGIC OTITIS MEDIA OF RIGHT EAR: ICD-10-CM

## 2024-04-26 DIAGNOSIS — B20 HUMAN IMMUNODEFICIENCY VIRUS (HIV) DISEASE: Primary | ICD-10-CM

## 2024-04-26 DIAGNOSIS — Z71.84 TRAVEL ADVICE ENCOUNTER: ICD-10-CM

## 2024-04-26 DIAGNOSIS — B35.1 ONYCHOMYCOSIS: ICD-10-CM

## 2024-04-26 DIAGNOSIS — Z12.5 PROSTATE CANCER SCREENING: ICD-10-CM

## 2024-04-26 LAB
ALBUMIN SERPL BCP-MCNC: 3.9 G/DL (ref 3.5–5.2)
ALP SERPL-CCNC: 90 U/L (ref 55–135)
ALT SERPL W/O P-5'-P-CCNC: 22 U/L (ref 10–44)
ANION GAP SERPL CALC-SCNC: 8 MMOL/L (ref 8–16)
AST SERPL-CCNC: 20 U/L (ref 10–40)
BACTERIA #/AREA URNS AUTO: NORMAL /HPF
BASOPHILS # BLD AUTO: 0.12 K/UL (ref 0–0.2)
BASOPHILS NFR BLD: 1.4 % (ref 0–1.9)
BILIRUB SERPL-MCNC: 0.5 MG/DL (ref 0.1–1)
BILIRUB UR QL STRIP: NEGATIVE
BUN SERPL-MCNC: 12 MG/DL (ref 6–20)
CALCIUM SERPL-MCNC: 9.7 MG/DL (ref 8.7–10.5)
CHLORIDE SERPL-SCNC: 102 MMOL/L (ref 95–110)
CHOLEST SERPL-MCNC: 172 MG/DL (ref 120–199)
CHOLEST/HDLC SERPL: 3.9 {RATIO} (ref 2–5)
CLARITY UR REFRACT.AUTO: CLEAR
CO2 SERPL-SCNC: 30 MMOL/L (ref 23–29)
COLOR UR AUTO: YELLOW
COMPLEXED PSA SERPL-MCNC: 1 NG/ML (ref 0–4)
CREAT SERPL-MCNC: 1.3 MG/DL (ref 0.5–1.4)
DIFFERENTIAL METHOD BLD: ABNORMAL
EOSINOPHIL # BLD AUTO: 0.2 K/UL (ref 0–0.5)
EOSINOPHIL NFR BLD: 2.7 % (ref 0–8)
ERYTHROCYTE [DISTWIDTH] IN BLOOD BY AUTOMATED COUNT: 13.7 % (ref 11.5–14.5)
EST. GFR  (NO RACE VARIABLE): >60 ML/MIN/1.73 M^2
ESTIMATED AVG GLUCOSE: 103 MG/DL (ref 68–131)
GLUCOSE SERPL-MCNC: 95 MG/DL (ref 70–110)
GLUCOSE UR QL STRIP: NEGATIVE
HBA1C MFR BLD: 5.2 % (ref 4–5.6)
HCT VFR BLD AUTO: 44.6 % (ref 40–54)
HDLC SERPL-MCNC: 44 MG/DL (ref 40–75)
HDLC SERPL: 25.6 % (ref 20–50)
HGB BLD-MCNC: 15.4 G/DL (ref 14–18)
HGB UR QL STRIP: NEGATIVE
IMM GRANULOCYTES # BLD AUTO: 0.03 K/UL (ref 0–0.04)
IMM GRANULOCYTES NFR BLD AUTO: 0.4 % (ref 0–0.5)
KETONES UR QL STRIP: NEGATIVE
LDLC SERPL CALC-MCNC: 100 MG/DL (ref 63–159)
LEUKOCYTE ESTERASE UR QL STRIP: NEGATIVE
LYMPHOCYTES # BLD AUTO: 2.7 K/UL (ref 1–4.8)
LYMPHOCYTES NFR BLD: 31.9 % (ref 18–48)
MCH RBC QN AUTO: 31.2 PG (ref 27–31)
MCHC RBC AUTO-ENTMCNC: 34.5 G/DL (ref 32–36)
MCV RBC AUTO: 90 FL (ref 82–98)
MICROSCOPIC COMMENT: NORMAL
MONOCYTES # BLD AUTO: 0.6 K/UL (ref 0.3–1)
MONOCYTES NFR BLD: 7.4 % (ref 4–15)
NEUTROPHILS # BLD AUTO: 4.8 K/UL (ref 1.8–7.7)
NEUTROPHILS NFR BLD: 56.2 % (ref 38–73)
NITRITE UR QL STRIP: NEGATIVE
NONHDLC SERPL-MCNC: 128 MG/DL
NRBC BLD-RTO: 0 /100 WBC
PH UR STRIP: 5 [PH] (ref 5–8)
PLATELET # BLD AUTO: 324 K/UL (ref 150–450)
PMV BLD AUTO: 10 FL (ref 9.2–12.9)
POTASSIUM SERPL-SCNC: 4.2 MMOL/L (ref 3.5–5.1)
PROT SERPL-MCNC: 7.5 G/DL (ref 6–8.4)
PROT UR QL STRIP: NEGATIVE
RBC # BLD AUTO: 4.94 M/UL (ref 4.6–6.2)
RBC #/AREA URNS AUTO: 0 /HPF (ref 0–4)
SODIUM SERPL-SCNC: 140 MMOL/L (ref 136–145)
SP GR UR STRIP: 1.02 (ref 1–1.03)
SQUAMOUS #/AREA URNS AUTO: 0 /HPF
TREPONEMA PALLIDUM IGG+IGM AB [PRESENCE] IN SERUM OR PLASMA BY IMMUNOASSAY: REACTIVE
TRIGL SERPL-MCNC: 140 MG/DL (ref 30–150)
URN SPEC COLLECT METH UR: NORMAL
WBC # BLD AUTO: 8.54 K/UL (ref 3.9–12.7)
WBC #/AREA URNS AUTO: 1 /HPF (ref 0–5)

## 2024-04-26 PROCEDURE — 3075F SYST BP GE 130 - 139MM HG: CPT | Mod: CPTII,S$GLB,, | Performed by: INTERNAL MEDICINE

## 2024-04-26 PROCEDURE — 80061 LIPID PANEL: CPT | Performed by: INTERNAL MEDICINE

## 2024-04-26 PROCEDURE — 4010F ACE/ARB THERAPY RXD/TAKEN: CPT | Mod: CPTII,S$GLB,, | Performed by: INTERNAL MEDICINE

## 2024-04-26 PROCEDURE — 3078F DIAST BP <80 MM HG: CPT | Mod: CPTII,S$GLB,, | Performed by: INTERNAL MEDICINE

## 2024-04-26 PROCEDURE — 1160F RVW MEDS BY RX/DR IN RCRD: CPT | Mod: CPTII,S$GLB,, | Performed by: INTERNAL MEDICINE

## 2024-04-26 PROCEDURE — 84153 ASSAY OF PSA TOTAL: CPT | Performed by: INTERNAL MEDICINE

## 2024-04-26 PROCEDURE — 1159F MED LIST DOCD IN RCRD: CPT | Mod: CPTII,S$GLB,, | Performed by: INTERNAL MEDICINE

## 2024-04-26 PROCEDURE — 86361 T CELL ABSOLUTE COUNT: CPT | Performed by: INTERNAL MEDICINE

## 2024-04-26 PROCEDURE — 81001 URINALYSIS AUTO W/SCOPE: CPT | Performed by: INTERNAL MEDICINE

## 2024-04-26 PROCEDURE — 87536 HIV-1 QUANT&REVRSE TRNSCRPJ: CPT | Performed by: INTERNAL MEDICINE

## 2024-04-26 PROCEDURE — 99215 OFFICE O/P EST HI 40 MIN: CPT | Mod: S$GLB,,, | Performed by: INTERNAL MEDICINE

## 2024-04-26 PROCEDURE — 86592 SYPHILIS TEST NON-TREP QUAL: CPT | Performed by: INTERNAL MEDICINE

## 2024-04-26 PROCEDURE — 82040 ASSAY OF SERUM ALBUMIN: CPT | Mod: 91 | Performed by: INTERNAL MEDICINE

## 2024-04-26 PROCEDURE — 86593 SYPHILIS TEST NON-TREP QUANT: CPT | Mod: 91 | Performed by: INTERNAL MEDICINE

## 2024-04-26 PROCEDURE — 85025 COMPLETE CBC W/AUTO DIFF WBC: CPT | Performed by: INTERNAL MEDICINE

## 2024-04-26 PROCEDURE — 99999 PR PBB SHADOW E&M-EST. PATIENT-LVL III: CPT | Mod: PBBFAC,,, | Performed by: INTERNAL MEDICINE

## 2024-04-26 PROCEDURE — 3008F BODY MASS INDEX DOCD: CPT | Mod: CPTII,S$GLB,, | Performed by: INTERNAL MEDICINE

## 2024-04-26 PROCEDURE — 86593 SYPHILIS TEST NON-TREP QUANT: CPT | Performed by: INTERNAL MEDICINE

## 2024-04-26 PROCEDURE — 80053 COMPREHEN METABOLIC PANEL: CPT | Performed by: INTERNAL MEDICINE

## 2024-04-26 PROCEDURE — 36415 COLL VENOUS BLD VENIPUNCTURE: CPT | Performed by: INTERNAL MEDICINE

## 2024-04-26 PROCEDURE — 87491 CHLMYD TRACH DNA AMP PROBE: CPT | Performed by: INTERNAL MEDICINE

## 2024-04-26 PROCEDURE — 83036 HEMOGLOBIN GLYCOSYLATED A1C: CPT | Performed by: INTERNAL MEDICINE

## 2024-04-26 RX ORDER — CETIRIZINE HYDROCHLORIDE 10 MG/1
10 TABLET ORAL DAILY
Qty: 30 TABLET | Refills: 11 | Status: SHIPPED | OUTPATIENT
Start: 2024-04-26 | End: 2025-04-26

## 2024-04-26 RX ORDER — TERBINAFINE HYDROCHLORIDE 250 MG/1
250 TABLET ORAL DAILY
Qty: 30 TABLET | Refills: 2 | Status: SHIPPED | OUTPATIENT
Start: 2024-04-26 | End: 2024-07-25

## 2024-04-26 NOTE — PROGRESS NOTES
Subjective     Patient ID: Francisco May II is a 43 y.o. male.    Chief Complaint:HIV Positive/AIDS      History of Present Illness    43 year old male with HIV.  He is here today for follow up.       Interval History  - Grandmother  in .  - Started being intimate with a partner last year.  They are still casual.  - He is intentionally losing weight.  He has been using mounjaro once a week.  Hasn't done much exercise.     Medications  Biktarvy  Lisinopril  Mounjaro (prescribed by another physician)  MVI  Testosterone supplement  L-tyrosine  L-theonine  Zyrtec prn        Medical History  HTN  Hypotestosteronism  Depression.  VITOR - uses cpap machine  DM  HIV  Syphilis  Gonorrhea  Chlamydia  Therapist: Marian Esparza        Surgical History  None     Family history  Father has history of Strokes and TIAs  HTN  DM  Brother has seizure disorder  Grandmother had leukemia  Grand mother had CHF        Family History  Father had stroke.        Social History  Visited [facility] X 2 weeks.  Visited [facility] and [facility].  Just bought a house in [place].  Brother will be moving there. Sister lives in [place].  Parents living.  He is still single.   Smokes occasional weed.  Has a drink every other days.  No illicit substances.  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  Started the business in 2020. Recently moved and now lives in the [facility] residences.    Review of Systems   Constitutional: Negative for chills, decreased appetite, fever, malaise/fatigue, night sweats, weight gain and weight loss.   HENT:  Negative for congestion, ear pain, hearing loss, hoarse voice, sore throat and tinnitus.    Eyes:  Negative for blurred vision, redness and visual disturbance.   Cardiovascular:  Negative for chest pain, leg swelling and palpitations.   Respiratory:  Negative for cough, hemoptysis, shortness of breath, sputum production and  wheezing.    Hematologic/Lymphatic: Negative for adenopathy. Does not bruise/bleed easily.   Skin:  Negative for dry skin, itching, rash and suspicious lesions.   Musculoskeletal:  Negative for back pain, joint pain, myalgias and neck pain.   Gastrointestinal:  Negative for abdominal pain, constipation, diarrhea, heartburn, nausea and vomiting.   Genitourinary:  Negative for dysuria, flank pain, frequency, hematuria, hesitancy and urgency.   Neurological:  Negative for dizziness, headaches, numbness, paresthesias and weakness.   Psychiatric/Behavioral:  Negative for depression and memory loss. The patient does not have insomnia and is not nervous/anxious.       Objective   Physical Exam  Vitals and nursing note reviewed.   Constitutional:       General: He is not in acute distress.     Appearance: He is well-developed. He is not diaphoretic.   HENT:      Head: Normocephalic and atraumatic.      Right Ear: External ear normal.      Left Ear: External ear normal.      Nose: Nose normal.      Mouth/Throat:      Pharynx: No oropharyngeal exudate.   Eyes:      General: No scleral icterus.        Right eye: No discharge.         Left eye: No discharge.      Conjunctiva/sclera: Conjunctivae normal.      Pupils: Pupils are equal, round, and reactive to light.   Neck:      Thyroid: No thyromegaly.      Vascular: No JVD.      Trachea: No tracheal deviation.   Cardiovascular:      Rate and Rhythm: Normal rate and regular rhythm.      Heart sounds: No murmur heard.     No friction rub. No gallop.   Pulmonary:      Effort: Pulmonary effort is normal. No respiratory distress.      Breath sounds: Normal breath sounds. No stridor. No wheezing or rales.   Chest:      Chest wall: No tenderness.   Abdominal:      General: Bowel sounds are normal. There is no distension.      Palpations: Abdomen is soft. There is no mass.      Tenderness: There is no abdominal tenderness. There is no guarding or rebound.   Musculoskeletal:          General: No tenderness. Normal range of motion.      Cervical back: Normal range of motion and neck supple.   Lymphadenopathy:      Cervical: No cervical adenopathy.   Skin:     General: Skin is warm.      Coloration: Skin is not pale.      Findings: No erythema or rash.   Neurological:      Mental Status: He is alert and oriented to person, place, and time.      Cranial Nerves: No cranial nerve deficit.      Motor: No abnormal muscle tone.      Coordination: Coordination normal.      Deep Tendon Reflexes: Reflexes are normal and symmetric. Reflexes normal.   Psychiatric:         Behavior: Behavior normal.         Thought Content: Thought content normal.         Judgment: Judgment normal.                        Assessment and Plan     1. Human immunodeficiency virus (HIV) disease :  Continue on biktarvy.  Will check labs today.  Will check lipids.   2. Chronic allergic otitis media of right ear :  Seen by ENT.  Tubes pulled.  Will continue on zyrtec.       4. Obesity (BMI 35.0-39.9 without comorbidity) :  Patient reports that he has been using mounjaro prescribed by another physician.  He is losing weight.  Will check hemoglobin A1c.   5. Hypogonadism in male :  will check testosterone level.   6. Prostate cancer screening :  will check PSA   7. Onychomycosis :  will refill terbinafine.       Plan:  Human immunodeficiency virus (HIV) disease  -     Lipid Panel; Future; Expected date: 04/26/2024  -     CD4 T-[place] Cells; Future; Expected date: 04/26/2024  -     Comprehensive Metabolic Panel; Future; Expected date: 04/26/2024  -     HIV RNA, Quantitative, PCR; Future; Expected date: 04/26/2024  -     CBC Auto Differential; Future; Expected date: 04/26/2024  -     Treponema Pallidum (Syphillis) Antibody; Future; Expected date: 04/26/2024  -     Urinalysis  -     C. trachomatis/N. gonorrhoeae by AMP DNA    Chronic allergic otitis media of right ear  -     cetirizine (ZYRTEC) 10 MG tablet; Take 1 tablet (10 mg total) by  mouth once daily.  Dispense: 30 tablet; Refill: 11    Travel advice encounter  -     cetirizine (ZYRTEC) 10 MG tablet; Take 1 tablet (10 mg total) by mouth once daily.  Dispense: 30 tablet; Refill: 11    Obesity (BMI 35.0-39.9 without comorbidity)  -     Hemoglobin A1C; Future; Expected date: 04/26/2024  -     Lipid Panel; Future; Expected date: 04/26/2024    Hypogonadism in male  -     TESTOSTERONE PANEL; Future; Expected date: 04/26/2024    Prostate cancer screening  -     PSA, SCREENING; Future; Expected date: 04/26/2024    Onychomycosis  -     terbinafine HCL (LAMISIL) 250 mg tablet; Take 1 tablet (250 mg total) by mouth once daily.  Dispense: 30 tablet; Refill: 2

## 2024-04-27 LAB
RPR SER QL: REACTIVE
RPR SER-TITR: ABNORMAL {TITER}

## 2024-04-28 LAB
C TRACH DNA SPEC QL NAA+PROBE: NOT DETECTED
N GONORRHOEA DNA SPEC QL NAA+PROBE: NOT DETECTED

## 2024-04-29 ENCOUNTER — PATIENT MESSAGE (OUTPATIENT)
Dept: INFECTIOUS DISEASES | Facility: CLINIC | Age: 44
End: 2024-04-29
Payer: COMMERCIAL

## 2024-04-29 LAB
CD3+CD4+ CELLS # BLD: 891 CELLS/UL (ref 300–1400)
CD3+CD4+ CELLS NFR BLD: 30.3 % (ref 28–57)
HIV1 RNA # SERPL NAA+PROBE: <20 COPIES/ML
HIV1 RNA SERPL NAA+PROBE-LOG#: <1.3 LOG COPIES/ML
HIV1 RNA SERPL QL NAA+PROBE: DETECTED

## 2024-05-01 DIAGNOSIS — B20 HUMAN IMMUNODEFICIENCY VIRUS (HIV) DISEASE: ICD-10-CM

## 2024-05-01 RX ORDER — BICTEGRAVIR SODIUM, EMTRICITABINE, AND TENOFOVIR ALAFENAMIDE FUMARATE 50; 200; 25 MG/1; MG/1; MG/1
1 TABLET ORAL DAILY
Qty: 30 TABLET | Refills: 2 | Status: ACTIVE | OUTPATIENT
Start: 2024-05-01 | End: 2024-07-30

## 2024-05-03 LAB
ALBUMIN SERPL-MCNC: 4.3 G/DL (ref 3.6–5.1)
SHBG SERPL-SCNC: 13 NMOL/L (ref 10–50)
TESTOST FREE SERPL-MCNC: 58.3 PG/ML (ref 46–224)
TESTOST SERPL-MCNC: 241 NG/DL (ref 250–1100)
TESTOSTERONE.FREE+WB SERPL-MCNC: 114.9 NG/DL

## 2024-05-05 ENCOUNTER — PATIENT MESSAGE (OUTPATIENT)
Dept: INFECTIOUS DISEASES | Facility: CLINIC | Age: 44
End: 2024-05-05
Payer: COMMERCIAL

## 2024-05-07 ENCOUNTER — OFFICE VISIT (OUTPATIENT)
Dept: OTOLARYNGOLOGY | Facility: CLINIC | Age: 44
End: 2024-05-07
Payer: COMMERCIAL

## 2024-05-07 DIAGNOSIS — H65.23 CHRONIC SEROUS OTITIS MEDIA OF BOTH EARS: ICD-10-CM

## 2024-05-07 DIAGNOSIS — H90.0 CONDUCTIVE HEARING LOSS, BILATERAL: Primary | ICD-10-CM

## 2024-05-07 PROCEDURE — 4010F ACE/ARB THERAPY RXD/TAKEN: CPT | Mod: CPTII,S$GLB,, | Performed by: OTOLARYNGOLOGY

## 2024-05-07 PROCEDURE — 99213 OFFICE O/P EST LOW 20 MIN: CPT | Mod: S$GLB,,, | Performed by: OTOLARYNGOLOGY

## 2024-05-07 PROCEDURE — 1159F MED LIST DOCD IN RCRD: CPT | Mod: CPTII,S$GLB,, | Performed by: OTOLARYNGOLOGY

## 2024-05-07 PROCEDURE — 99999 PR PBB SHADOW E&M-EST. PATIENT-LVL II: CPT | Mod: PBBFAC,,, | Performed by: OTOLARYNGOLOGY

## 2024-05-07 PROCEDURE — 3044F HG A1C LEVEL LT 7.0%: CPT | Mod: CPTII,S$GLB,, | Performed by: OTOLARYNGOLOGY

## 2024-05-08 ENCOUNTER — TELEPHONE (OUTPATIENT)
Dept: OTOLARYNGOLOGY | Facility: CLINIC | Age: 44
End: 2024-05-08
Payer: COMMERCIAL

## 2024-05-08 DIAGNOSIS — H93.8X1 SENSATION OF FULLNESS IN RIGHT EAR: ICD-10-CM

## 2024-05-08 DIAGNOSIS — H65.04 RECURRENT ACUTE SEROUS OTITIS MEDIA OF RIGHT EAR: Primary | ICD-10-CM

## 2024-05-08 DIAGNOSIS — H90.0 CONDUCTIVE HEARING LOSS, BILATERAL: ICD-10-CM

## 2024-05-09 NOTE — PROGRESS NOTES
Subjective     Patient ID: Francisco May II is a 43 y.o. male.    Chief Complaint: Ear Fullness    Ear Fullness   Pertinent negatives include no sore throat.        Francisco May II is a 43 y.o. male with CH AD >AS. COME s/p PE tubes presents for evaluation.  Pt would like to pursue a long marcia PE tube.  Felt great with in-office tubes. Has never had a T-tube. No prior ear surgery.  Last set has fallen out.  Has had feelings of ear fullness and decreased hearing in right ear for > 3 months.  Has tried nasal steroids with no benefit.     Review of Systems   Constitutional:  Negative for chills and fever.   HENT:  Negative for sore throat and trouble swallowing.    Respiratory:  Negative for apnea and chest tightness.    Cardiovascular:  Negative for chest pain.          Objective     Physical Exam  Vitals and nursing note reviewed.   Constitutional:       Appearance: Normal appearance.   HENT:      Head: Normocephalic and atraumatic.      Right Ear: Ear canal and external ear normal. A middle ear effusion is present. There is no impacted cerumen.      Left Ear: Ear canal and external ear normal. A middle ear effusion is present. There is no impacted cerumen.   Neurological:      Mental Status: He is alert.         Data Reviewed:           Assessment and Plan     1. Conductive hearing loss, bilateral - right > left    2. Chronic serous otitis media of both ears          Discussed T-tube placement in OR vs in office collar tubes    Pt prefers T-tube placement   Will schedule         No follow-ups on file.

## 2024-06-05 NOTE — PRE-PROCEDURE INSTRUCTIONS
Patient reports taking mounjaro. Instructed patient to hold mounjaro for 7 days prior to surgery with Dr. Lemon on 6/12. Patient verb understanding of instructions.

## 2024-06-11 NOTE — PRE-PROCEDURE INSTRUCTIONS
The following was discussed with pt via phone and sent to pt portal. Pt verbalized understanding. Pt to be accompanied by friend Jose Manuel.    Dear Francisco ,     You are scheduled for a procedure with Dr. Lemon on 6/12/2024. Your scheduled arrival time is 11:00 am.  This arrival time is roughly 2 hours before your anticipated procedure time to allow sufficient time for pre-op.  Please wear comfortable clothing  This procedure will take place at the [facility] at the corner of [facility] and [facility].  [facility] [place] [facility] to [facility]. The address is:     [address] [place] [address].  [place] [id number]     After entering the building, proceed to the second floor and check in with registration. You should take any medications that you routinely take for blood pressure (other than those listed below), heart medications, thyroid, cholesterol, etc.      If you wear contact lenses, please wear glasses to your procedure.     Your fasting instructions are as follow:     Nothing to eat after midnight the evening before your surgery.   You may drink clear liquids up until 2 hours prior to your arrival time.      You MUST have a responsible adult to bring you home.     The evening before and morning of your procedure, please hold the following medications:  Aspirin and Aspirin-containing products (Goody's powder, Excedrin)  NSAIDs (Advil, Ibuprofen, Aleve, Diclofenac)  Vitamins/Supplements  Herbal remedies/Teas  Stimulants (Adderall, Vyvanse, Adipex)  Diabetic medication (Please bring with you day of procedure)  Prescription creams/gels/lotions        *May take Tylenol        The evening before and morning of your procedure, take a shower using antibacterial soap (ex: Hibiclens or Dial antibacterial soap). DO NOT apply deodorant, lotion, cologne, or anything else to the skin. Do not wear jewelry or bring any valuables with you. If you wear dentures please bring your case with  you or leave them at home. Use and bring any inhalers that you may have.     If you have any procedure-specific questions, please call your surgeon's office. Any other questions, don't hesitate to call at [phone]     Thanks,  Pre-Admit Testing  Anesthesia Dept [facility]

## 2024-06-12 ENCOUNTER — ANESTHESIA (OUTPATIENT)
Dept: SURGERY | Facility: HOSPITAL | Age: 44
End: 2024-06-12
Payer: COMMERCIAL

## 2024-06-12 ENCOUNTER — ANESTHESIA EVENT (OUTPATIENT)
Dept: SURGERY | Facility: HOSPITAL | Age: 44
End: 2024-06-12
Payer: COMMERCIAL

## 2024-06-12 ENCOUNTER — HOSPITAL ENCOUNTER (OUTPATIENT)
Facility: HOSPITAL | Age: 44
Discharge: HOME | End: 2024-06-12
Attending: OTOLARYNGOLOGY | Admitting: OTOLARYNGOLOGY
Payer: COMMERCIAL

## 2024-06-12 VITALS
DIASTOLIC BLOOD PRESSURE: 64 MMHG | TEMPERATURE: 98 F | HEART RATE: 84 BPM | SYSTOLIC BLOOD PRESSURE: 123 MMHG | HEIGHT: 71 IN | RESPIRATION RATE: 14 BRPM | WEIGHT: 251 LBS | OXYGEN SATURATION: 96 % | BODY MASS INDEX: 35.14 KG/M2

## 2024-06-12 DIAGNOSIS — H65.23 BILATERAL CHRONIC SEROUS OTITIS MEDIA: ICD-10-CM

## 2024-06-12 DIAGNOSIS — H66.90 CHRONIC OTITIS MEDIA: Primary | ICD-10-CM

## 2024-06-12 PROCEDURE — 36000705 HC OR TIME LEV I EA ADD 15 MIN: Performed by: OTOLARYNGOLOGY

## 2024-06-12 PROCEDURE — 69436 CREATE EARDRUM OPENING: CPT | Mod: 50,,, | Performed by: OTOLARYNGOLOGY

## 2024-06-12 PROCEDURE — 36000704 HC OR TIME LEV I 1ST 15 MIN: Performed by: OTOLARYNGOLOGY

## 2024-06-12 PROCEDURE — 25000003 PHARM REV CODE 250: Performed by: OTOLARYNGOLOGY

## 2024-06-12 PROCEDURE — 71000033 HC RECOVERY, INTIAL HOUR: Performed by: OTOLARYNGOLOGY

## 2024-06-12 PROCEDURE — 94761 N-INVAS EAR/PLS OXIMETRY MLT: CPT

## 2024-06-12 PROCEDURE — 25000003 PHARM REV CODE 250: Performed by: NURSE ANESTHETIST, CERTIFIED REGISTERED

## 2024-06-12 PROCEDURE — 25000003 PHARM REV CODE 250: Performed by: ANESTHESIOLOGY

## 2024-06-12 PROCEDURE — 99900035 HC TECH TIME PER 15 MIN (STAT)

## 2024-06-12 PROCEDURE — 37000008 HC ANESTHESIA 1ST 15 MINUTES: Performed by: OTOLARYNGOLOGY

## 2024-06-12 PROCEDURE — 63600175 PHARM REV CODE 636 W HCPCS: Performed by: NURSE ANESTHETIST, CERTIFIED REGISTERED

## 2024-06-12 PROCEDURE — 71000015 HC POSTOP RECOV 1ST HR: Performed by: OTOLARYNGOLOGY

## 2024-06-12 PROCEDURE — 37000009 HC ANESTHESIA EA ADD 15 MINS: Performed by: OTOLARYNGOLOGY

## 2024-06-12 PROCEDURE — 27800903 OPTIME MED/SURG SUP & DEVICES OTHER IMPLANTS: Performed by: OTOLARYNGOLOGY

## 2024-06-12 DEVICE — IMPLANTABLE DEVICE: Type: IMPLANTABLE DEVICE | Site: EAR | Status: FUNCTIONAL

## 2024-06-12 RX ORDER — HYDROMORPHONE HYDROCHLORIDE 1 MG/ML
0.2 INJECTION, SOLUTION INTRAMUSCULAR; INTRAVENOUS; SUBCUTANEOUS EVERY 5 MIN PRN
Status: DISCONTINUED | OUTPATIENT
Start: 2024-06-12 | End: 2024-06-12 | Stop reason: HOSPADM

## 2024-06-12 RX ORDER — ONDANSETRON HYDROCHLORIDE 2 MG/ML
INJECTION, SOLUTION INTRAVENOUS
Status: DISCONTINUED | OUTPATIENT
Start: 2024-06-12 | End: 2024-06-12

## 2024-06-12 RX ORDER — ONDANSETRON HYDROCHLORIDE 2 MG/ML
4 INJECTION, SOLUTION INTRAVENOUS DAILY PRN
Status: DISCONTINUED | OUTPATIENT
Start: 2024-06-12 | End: 2024-06-12 | Stop reason: HOSPADM

## 2024-06-12 RX ORDER — LIDOCAINE HYDROCHLORIDE 20 MG/ML
INJECTION INTRAVENOUS
Status: DISCONTINUED | OUTPATIENT
Start: 2024-06-12 | End: 2024-06-12

## 2024-06-12 RX ORDER — SODIUM CHLORIDE 9 MG/ML
INJECTION, SOLUTION INTRAVENOUS CONTINUOUS
Status: DISCONTINUED | OUTPATIENT
Start: 2024-06-12 | End: 2024-06-12 | Stop reason: HOSPADM

## 2024-06-12 RX ORDER — MIDAZOLAM HYDROCHLORIDE 1 MG/ML
INJECTION INTRAMUSCULAR; INTRAVENOUS
Status: DISCONTINUED | OUTPATIENT
Start: 2024-06-12 | End: 2024-06-12

## 2024-06-12 RX ORDER — NEOMYCIN SULFATE, POLYMYXIN B SULFATE AND HYDROCORTISONE 10; 3.5; 1 MG/ML; MG/ML; [USP'U]/ML
SUSPENSION/ DROPS AURICULAR (OTIC)
Status: DISCONTINUED | OUTPATIENT
Start: 2024-06-12 | End: 2024-06-12 | Stop reason: HOSPADM

## 2024-06-12 RX ORDER — FENTANYL CITRATE 50 UG/ML
INJECTION, SOLUTION INTRAMUSCULAR; INTRAVENOUS
Status: DISCONTINUED | OUTPATIENT
Start: 2024-06-12 | End: 2024-06-12

## 2024-06-12 RX ORDER — PROCHLORPERAZINE EDISYLATE 5 MG/ML
5 INJECTION INTRAMUSCULAR; INTRAVENOUS EVERY 30 MIN PRN
Status: DISCONTINUED | OUTPATIENT
Start: 2024-06-12 | End: 2024-06-12 | Stop reason: HOSPADM

## 2024-06-12 RX ORDER — DEXAMETHASONE SODIUM PHOSPHATE 4 MG/ML
INJECTION, SOLUTION INTRA-ARTICULAR; INTRALESIONAL; INTRAMUSCULAR; INTRAVENOUS; SOFT TISSUE
Status: DISCONTINUED | OUTPATIENT
Start: 2024-06-12 | End: 2024-06-12

## 2024-06-12 RX ORDER — FENTANYL CITRATE 50 UG/ML
25 INJECTION, SOLUTION INTRAMUSCULAR; INTRAVENOUS EVERY 5 MIN PRN
Status: DISCONTINUED | OUTPATIENT
Start: 2024-06-12 | End: 2024-06-12 | Stop reason: HOSPADM

## 2024-06-12 RX ORDER — PROPOFOL 10 MG/ML
VIAL (ML) INTRAVENOUS
Status: DISCONTINUED | OUTPATIENT
Start: 2024-06-12 | End: 2024-06-12

## 2024-06-12 RX ORDER — OXYCODONE AND ACETAMINOPHEN 5; 325 MG/1; MG/1
1 TABLET ORAL
Status: DISCONTINUED | OUTPATIENT
Start: 2024-06-12 | End: 2024-06-12 | Stop reason: HOSPADM

## 2024-06-12 RX ADMIN — LIDOCAINE HYDROCHLORIDE 100 MG: 20 INJECTION INTRAVENOUS at 02:06

## 2024-06-12 RX ADMIN — OXYCODONE HYDROCHLORIDE AND ACETAMINOPHEN 1 TABLET: 5; 325 TABLET ORAL at 03:06

## 2024-06-12 RX ADMIN — FENTANYL CITRATE 50 MCG: 50 INJECTION, SOLUTION INTRAMUSCULAR; INTRAVENOUS at 02:06

## 2024-06-12 RX ADMIN — SODIUM CHLORIDE: 0.9 INJECTION, SOLUTION INTRAVENOUS at 12:06

## 2024-06-12 RX ADMIN — SODIUM CHLORIDE: 0.9 INJECTION, SOLUTION INTRAVENOUS at 02:06

## 2024-06-12 RX ADMIN — PROPOFOL 50 MG: 10 INJECTION, EMULSION INTRAVENOUS at 02:06

## 2024-06-12 RX ADMIN — MIDAZOLAM HYDROCHLORIDE 2 MG: 1 INJECTION, SOLUTION INTRAMUSCULAR; INTRAVENOUS at 02:06

## 2024-06-12 RX ADMIN — DEXAMETHASONE SODIUM PHOSPHATE 4 MG: 4 INJECTION INTRA-ARTICULAR; INTRALESIONAL; INTRAMUSCULAR; INTRAVENOUS; SOFT TISSUE at 02:06

## 2024-06-12 RX ADMIN — PROPOFOL 200 MG: 10 INJECTION, EMULSION INTRAVENOUS at 02:06

## 2024-06-12 RX ADMIN — ONDANSETRON 4 MG: 2 INJECTION INTRAMUSCULAR; INTRAVENOUS at 02:06

## 2024-06-12 NOTE — H&P
Subjective   HPI below.     Patient ID: Francisco May II is a 43 y.o. male.    Chief Complaint: No chief complaint on file.    Ear Fullness   Pertinent negatives include no sore throat.        Francisco May II is a 43 y.o. male with CH AD >AS. COME s/p PE tubes presents for evaluation.  Pt would like to pursue a long marcia PE tube.  Felt great with in-office tubes. Has never had a T-tube. No prior ear surgery.  Last set has fallen out.  Has had feelings of ear fullness and decreased hearing in right ear for > 3 months.  Has tried nasal steroids with no benefit.     Review of Systems   Constitutional:  Negative for chills and fever.   HENT:  Negative for sore throat and trouble swallowing.    Respiratory:  Negative for apnea and chest tightness.    Cardiovascular:  Negative for chest pain.          Objective     Physical Exam  Vitals and nursing note reviewed.   Constitutional:       Appearance: Normal appearance.   HENT:      Head: Normocephalic and atraumatic.      Right Ear: Ear canal and external ear normal. A middle ear effusion is present. There is no impacted cerumen.      Left Ear: Ear canal and external ear normal. A middle ear effusion is present. There is no impacted cerumen.   Neurological:      Mental Status: He is alert.         Data Reviewed:           Assessment and Plan     1. Chronic otitis media    Other orders  -     Place in Outpatient; Standing  -     Admit to Phase 1 PACU, transfer to Phase 2 per protocol when indicated ; Standing  -     Vital signs; Standing  -     fentaNYL 50 mcg/mL injection 25 mcg  -     oxyCODONE-acetaminophen 5-325 mg per tablet 1 tablet  -     HYDROmorphone injection 0.2 mg  -     ondansetron injection 4 mg  -     prochlorperazine injection Soln 5 mg  -     Intake and output Per protocol; Standing  -     Apply warming blanket; Standing  -     Notify Physician - Potential Need of Opioid Reversal; Standing  -     Oxygen Continuous; Standing  -      Pulse Oximetry Continuous; Standing  -     0.9%  NaCl infusion          Discussed T-tube placement in OR vs in office collar tubes    Pt prefers T-tube placement   Will schedule         No follow-ups on file.

## 2024-06-12 NOTE — BRIEF OP NOTE
[facility]  Brief Operative Note    Surgery Date: 6/12/2024     Surgeons and Role:     * Kristian Lemon MD - Primary    Assisting Surgeon: None    Pre-op Diagnosis:  Recurrent acute serous otitis media of right ear [H65.04]  Sensation of fullness in right ear [H93.8X1]  Conductive hearing loss, bilateral [H90.0]    Post-op Diagnosis:  Post-Op Diagnosis Codes:     * Recurrent acute serous otitis media of right ear [H65.04]     * Sensation of fullness in right ear [H93.8X1]     * Conductive hearing loss, bilateral [H90.0]    Procedure(s) (LRB):  MYRINGOTOMY, WITH TYMPANOSTOMY TUBE INSERTION (Bilateral)    Anesthesia: General    Operative Findings:   Bilateral T-tube placement   Effusion on R, no effusion L today    Estimated Blood Loss: * No values recorded between 6/12/2024  2:24 PM and 6/12/2024  2:54 PM *         Specimens:   Specimen (24h ago, onward)      None              Discharge Note    OUTCOME: Patient tolerated treatment/procedure well without complication and is now ready for discharge.    DISPOSITION: Home or Self Care    FINAL DIAGNOSIS:  Chronic otitis media    FOLLOWUP: In clinic    DISCHARGE INSTRUCTIONS:    Discharge Procedure Orders   Other restrictions (specify):   Order Comments: See instructions

## 2024-06-12 NOTE — ANESTHESIA PROCEDURE NOTES
Intubation    Date/Time: 6/12/2024 2:17 PM    Performed by: Raven Ziegler CRNA  Authorized by: Antonio Gomes MD    Intubation:     Induction:  Intravenous    Intubated:  Postinduction    Mask Ventilation:  Not attempted    Attempts:  1    Attempted By:  CRNA    Intubation method: lma.    Difficult Airway Encountered?: No      Complications:  None    Airway Device:  Supraglottic airway/LMA (igel)    Airway Device Size:  5.0    Style/Cuff Inflation:  Cuffed (inflated to minimal occlusive pressure)    Secured at:  The lips    Placement Verified By:  Capnometry    Complicating Factors:  None    Findings Post-Intubation:  BS equal bilateral

## 2024-06-12 NOTE — ANESTHESIA PREPROCEDURE EVALUATION
06/12/2024  Francisco May II is a 43 y.o., male.      Pre-op Assessment    I have reviewed the Patient Summary Reports.     I have reviewed the Nursing Notes. I have reviewed the NPO Status.   I have reviewed the Medications.     Review of Systems  Anesthesia Hx:             Denies Family Hx of Anesthesia complications.    Denies Personal Hx of Anesthesia complications.                    Cardiovascular:     Hypertension                                        Pulmonary:        Sleep Apnea                  Physical Exam    Airway:  Mallampati: III   Mouth Opening: Normal  TM Distance: Normal  Neck ROM: Normal ROM    Anesthesia Plan  Type of Anesthesia, risks & benefits discussed:    Anesthesia Type: Gen Natural Airway, Gen Supraglottic Airway  Intra-op Monitoring Plan: Standard ASA Monitors  Post Op Pain Control Plan: multimodal analgesia  Induction:  IV  Informed Consent: Informed consent signed with the Patient and all parties understand the risks and agree with anesthesia plan.  All questions answered.   ASA Score: 3  Day of Surgery Review of History & Physical: H&P Update referred to the surgeon/provider.    Ready For Surgery From Anesthesia Perspective.   .

## 2024-06-12 NOTE — OP NOTE
[facility]  Surgery Department  Operative Note    SUMMARY     Date of Procedure: 6/12/2024     Procedure: Procedure(s) (LRB):  MYRINGOTOMY, WITH TYMPANOSTOMY TUBE INSERTION (Bilateral)     Surgeons and Role:     * Kristian Lemon MD - Primary    Assisting Surgeon: None    Pre-Operative Diagnosis: Recurrent acute serous otitis media of right ear [H65.04]  Sensation of fullness in right ear [H93.8X1]  Conductive hearing loss, bilateral [H90.0]    Post-Operative Diagnosis: Post-Op Diagnosis Codes:     * Recurrent acute serous otitis media of right ear [H65.04]     * Sensation of fullness in right ear [H93.8X1]     * Conductive hearing loss, bilateral [H90.0]    Anesthesia: General    Operative Findings (including complications, if any):   Serous effusion right ear, no effusion on left   Bilateral T-tubes placed    Description of Technical Procedures:   After informed consent regarding infection, perforation, early extrusion and possible cholesteatoma formation the patient was brought to the minor procedure room and placed in the supine position. The operating microscope was then brought onto the field and the tympanic membrane was visualized.  A myringotomy incision was made and the effusion evacuated. A sterile T- tube was placed and the procedure was terminated. We then turned our attention to the left ear.  After placing a speculum in the ear canal a myringotomy incision was mad anteriorly.  There was no effusion.  A T-tube was placed.  Ear drops were applied.  A cotton ball was placed at the meatus            Estimated Blood Loss (EBL): 2ml           Implants:   Implant Name Type Inv. Item Serial No.  Lot No. LRB No. Used Action   [facility] [id number]  TUBE MYRINGOTOMY [facility] [id number] [id number] Left 1 Implanted   TUBE MYRINGOTOMY RICHARDS [place] - [id number]  TUBE MYRINGOTOMY RICHARDS GRO [id number] [facility] [id number] Right 1 Implanted        Specimens:   Specimen (24h ago, onward)      None                    Condition: Good    Disposition: PACU - hemodynamically stable.    Attestation: Op Note Attestation: I was physically present and scrubbed for the entire procedure.

## 2024-06-12 NOTE — ANESTHESIA POSTPROCEDURE EVALUATION
Anesthesia Post Evaluation    Patient: Francisco May II    Procedure(s) Performed: Procedure(s) (LRB):  MYRINGOTOMY, WITH TYMPANOSTOMY TUBE INSERTION (Bilateral)    Final Anesthesia Type: general      Patient location during evaluation: PACU  Patient participation: Yes- Able to Participate  Level of consciousness: awake and alert  Post-procedure vital signs: reviewed and stable  Pain management: adequate  Airway patency: patent    PONV status at discharge: No PONV  Anesthetic complications: no      Cardiovascular status: stable  Respiratory status: spontaneous ventilation  Hydration status: euvolemic  Follow-up not needed.          Vitals Value Taken Time   /61 06/12/24 1503   Temp 36.6 °C (97.9 °F) 06/12/24 1503   Pulse 92 06/12/24 1512   Resp 25 06/12/24 1512   SpO2 95 % 06/12/24 1512   Vitals shown include unfiled device data.      No case tracking events are documented in the log.      Pain/Faviola Score: Faviola Score: 10 (6/12/2024  3:03 PM)

## 2024-06-12 NOTE — DISCHARGE INSTRUCTIONS
Discharge instructions:   - Start using drops to both ears tomorrow  - Tylenol/ibuprofen for pain  - Ears may drain  - Keep ears dry, use cotton with vaseline in ears when showering

## 2024-06-12 NOTE — TRANSFER OF CARE
"Anesthesia Transfer of Care Note    Patient: Francisco May II    Procedure(s) Performed: Procedure(s) (LRB):  MYRINGOTOMY, WITH TYMPANOSTOMY TUBE INSERTION (Bilateral)    Patient location: PACU    Anesthesia Type: general    Transport from OR: Transported from OR on 6-10 L/min O2 by face mask with adequate spontaneous ventilation    Post pain: adequate analgesia    Post assessment: no apparent anesthetic complications and tolerated procedure well    Post vital signs: stable    Level of consciousness: responds to stimulation and sedated    Nausea/Vomiting: no nausea/vomiting    Complications: none    Transfer of care protocol was followed      Last vitals: Visit Vitals  /66 (BP Location: Right arm, Patient Position: Lying)   Pulse 74   Temp 36.7 °C (98.1 °F) (Temporal)   Resp 16   Ht 5' 11" (1.803 m)   Wt 113.9 kg (251 lb)   SpO2 95%   BMI 35.01 kg/m²     "

## 2024-06-25 DIAGNOSIS — I10 ESSENTIAL HYPERTENSION: ICD-10-CM

## 2024-06-26 ENCOUNTER — TELEPHONE (OUTPATIENT)
Dept: OTOLARYNGOLOGY | Facility: CLINIC | Age: 44
End: 2024-06-26
Payer: COMMERCIAL

## 2024-06-27 RX ORDER — LISINOPRIL 20 MG/1
20 TABLET ORAL DAILY
Qty: 30 TABLET | Refills: 11 | Status: SHIPPED | OUTPATIENT
Start: 2024-06-27 | End: 2025-06-27

## 2024-07-03 ENCOUNTER — PATIENT MESSAGE (OUTPATIENT)
Dept: OTOLARYNGOLOGY | Facility: CLINIC | Age: 44
End: 2024-07-03
Payer: COMMERCIAL

## 2024-07-05 ENCOUNTER — OFFICE VISIT (OUTPATIENT)
Dept: OTOLARYNGOLOGY | Facility: CLINIC | Age: 44
End: 2024-07-05
Payer: COMMERCIAL

## 2024-07-05 DIAGNOSIS — H65.23 CHRONIC SEROUS OTITIS MEDIA OF BOTH EARS: Primary | ICD-10-CM

## 2024-07-05 PROCEDURE — 99999 PR PBB SHADOW E&M-EST. PATIENT-LVL II: CPT | Mod: PBBFAC,,, | Performed by: OTOLARYNGOLOGY

## 2024-07-05 NOTE — PROGRESS NOTES
Subjective     Patient ID: Francisco May II is a 43 y.o. male.    Chief Complaint: Follow-up    Francisco May II is a 43 y.o. male with chronic otitis media with effusion. Underwent in-office myringotomy and tympanostomy tube placement with Dr. Phillip on 11/30/22 with great relief. Now s/p bilateral T tube placement with Dr. Lemon on 6/12/24. Hearing well. No complaints.    Review of Systems   Constitutional:  Negative for chills and fever.   HENT:  Negative for sore throat and trouble swallowing.    Respiratory:  Negative for apnea and chest tightness.    Cardiovascular:  Negative for chest pain.      Objective     Physical Exam  Vitals and nursing note reviewed.   Constitutional:       Appearance: Normal appearance.   HENT:      Head: Normocephalic and atraumatic.      Right Ear: Ear canal and external ear normal. No middle ear effusion. A PE tube is present.      Left Ear: Ear canal and external ear normal.  No middle ear effusion. A PE tube is present.      Ears:      Chin exam findings: Does not lateralize.     Right Rinne: AC > BC.     Left Rinne: AC > BC.  Neurological:      Mental Status: He is alert.     Data Reviewed:       Assessment and Plan     1. Chronic serous otitis media of both ears    Bilateral T tubes patent. No issues with hearing. RTC in 6 months for tube check.

## 2024-07-17 DIAGNOSIS — B35.1 ONYCHOMYCOSIS: ICD-10-CM

## 2024-07-18 RX ORDER — TERBINAFINE HYDROCHLORIDE 250 MG/1
250 TABLET ORAL DAILY
Qty: 30 TABLET | Refills: 2 | Status: SHIPPED | OUTPATIENT
Start: 2024-07-18 | End: 2024-10-16

## 2024-08-12 DIAGNOSIS — B20 HUMAN IMMUNODEFICIENCY VIRUS (HIV) DISEASE: ICD-10-CM

## 2024-08-13 RX ORDER — BICTEGRAVIR SODIUM, EMTRICITABINE, AND TENOFOVIR ALAFENAMIDE FUMARATE 50; 200; 25 MG/1; MG/1; MG/1
1 TABLET ORAL DAILY
Qty: 30 TABLET | Refills: 2 | Status: ACTIVE | OUTPATIENT
Start: 2024-08-13 | End: 2024-11-11

## 2024-09-05 DIAGNOSIS — B35.1 ONYCHOMYCOSIS: ICD-10-CM

## 2024-09-05 DIAGNOSIS — Z71.84 TRAVEL ADVICE ENCOUNTER: ICD-10-CM

## 2024-09-05 DIAGNOSIS — H65.411 CHRONIC ALLERGIC OTITIS MEDIA OF RIGHT EAR: ICD-10-CM

## 2024-09-05 RX ORDER — TERBINAFINE HYDROCHLORIDE 250 MG/1
250 TABLET ORAL DAILY
Qty: 30 TABLET | Refills: 2 | Status: SHIPPED | OUTPATIENT
Start: 2024-09-05 | End: 2024-12-04

## 2024-09-05 RX ORDER — CETIRIZINE HYDROCHLORIDE 10 MG/1
10 TABLET ORAL DAILY
Qty: 30 TABLET | Refills: 11 | Status: SHIPPED | OUTPATIENT
Start: 2024-09-05 | End: 2025-09-05

## 2024-09-06 ENCOUNTER — TELEPHONE (OUTPATIENT)
Dept: PHARMACY | Facility: CLINIC | Age: 44
End: 2024-09-06
Payer: COMMERCIAL

## 2024-09-06 NOTE — TELEPHONE ENCOUNTER
[facility] Chart Review & Patient Outreach Details For Medication Adherence Project    Reason for Outreach Encounter: 3rd Party payor non-compliance report ([employer], [employer], C, etc)    2.  Patient Outreach Method: Reviewed patient chart     3.   Medication in question:   Hypertension Medications               lisinopriL (PRINIVIL,ZESTRIL) 20 MG tablet Take 1 tablet (20 mg total) by mouth once daily.               LAST FILLED: 6/28/24 for 90 day supply    4.  Reviewed and or Updates Made To: Patient Chart    5. Outreach Outcomes and/or actions taken: Patient filled medication and is on track to be adherent    Additional Notes:

## 2024-10-17 ENCOUNTER — TELEPHONE (OUTPATIENT)
Dept: PHARMACY | Facility: CLINIC | Age: 44
End: 2024-10-17
Payer: COMMERCIAL

## 2024-10-17 NOTE — TELEPHONE ENCOUNTER
[facility] Chart Review & Patient Outreach Details For Medication Adherence Project    Reason for Outreach Encounter: 3rd Party payor non-compliance report ([employer], [employer], C, etc)  2.  Patient Outreach Method: Reviewed patient chart  and Telephone call  3.   Medication in question:   Hypertension Medications               lisinopriL (PRINIVIL,ZESTRIL) 20 MG tablet Take 1 tablet (20 mg total) by mouth once daily.                 Lisinopril  last filled  6/28/24 for 90 day supply      4.  Reviewed and or Updates Made To: Patient Chart  5. Outreach Outcomes and/or actions taken: Sent inquiry to patient: Waiting for response  Additional Notes:

## 2024-10-19 DIAGNOSIS — H65.411 CHRONIC ALLERGIC OTITIS MEDIA OF RIGHT EAR: ICD-10-CM

## 2024-10-19 DIAGNOSIS — I10 ESSENTIAL HYPERTENSION: ICD-10-CM

## 2024-10-19 DIAGNOSIS — Z71.84 TRAVEL ADVICE ENCOUNTER: ICD-10-CM

## 2024-10-21 RX ORDER — CETIRIZINE HYDROCHLORIDE 10 MG/1
10 TABLET ORAL DAILY
Qty: 30 TABLET | Refills: 11 | Status: SHIPPED | OUTPATIENT
Start: 2024-10-21 | End: 2025-10-21

## 2024-10-21 RX ORDER — LISINOPRIL 20 MG/1
20 TABLET ORAL DAILY
Qty: 30 TABLET | Refills: 11 | Status: SHIPPED | OUTPATIENT
Start: 2024-10-21 | End: 2025-10-21

## 2024-11-11 ENCOUNTER — TELEPHONE (OUTPATIENT)
Dept: PHARMACY | Facility: CLINIC | Age: 44
End: 2024-11-11
Payer: COMMERCIAL

## 2024-11-12 NOTE — TELEPHONE ENCOUNTER
[facility]/Population Health Chart Review & Patient Outreach Details For Medication Adherence Project    Reason for Outreach Encounter: 3rd Party payor non-compliance report ([employer], [employer], C, etc)  2.  Patient Outreach Method: Reviewed Patient Chart  3.   Medication in question: lisinopril    LAST FILLED: 10/21/24 for 90 day supply  Hypertension Medications               lisinopriL (PRINIVIL,ZESTRIL) 20 MG tablet Take 1 tablet (20 mg total) by mouth once daily.              4.  Reviewed and or Updates Made To: Patient Chart  5. Outreach Outcomes and/or actions taken: Patient filled medication and is on track to be adherent

## 2024-11-20 DIAGNOSIS — B35.1 ONYCHOMYCOSIS: ICD-10-CM

## 2024-11-20 RX ORDER — TERBINAFINE HYDROCHLORIDE 250 MG/1
250 TABLET ORAL DAILY
Qty: 30 TABLET | Refills: 2 | Status: SHIPPED | OUTPATIENT
Start: 2024-11-20 | End: 2024-11-20

## 2024-12-05 DIAGNOSIS — B20 HUMAN IMMUNODEFICIENCY VIRUS (HIV) DISEASE: ICD-10-CM

## 2024-12-06 RX ORDER — BICTEGRAVIR SODIUM, EMTRICITABINE, AND TENOFOVIR ALAFENAMIDE FUMARATE 50; 200; 25 MG/1; MG/1; MG/1
1 TABLET ORAL DAILY
Qty: 30 TABLET | Refills: 2 | Status: ACTIVE | OUTPATIENT
Start: 2024-12-06 | End: 2025-03-06

## 2025-01-13 ENCOUNTER — PATIENT MESSAGE (OUTPATIENT)
Dept: ADMINISTRATIVE | Facility: OTHER | Age: 45
End: 2025-01-13
Payer: COMMERCIAL

## 2025-01-29 ENCOUNTER — PATIENT MESSAGE (OUTPATIENT)
Dept: INFECTIOUS DISEASES | Facility: CLINIC | Age: 45
End: 2025-01-29
Payer: COMMERCIAL

## 2025-04-02 DIAGNOSIS — B20 HUMAN IMMUNODEFICIENCY VIRUS (HIV) DISEASE: ICD-10-CM

## 2025-04-02 RX ORDER — BICTEGRAVIR SODIUM, EMTRICITABINE, AND TENOFOVIR ALAFENAMIDE FUMARATE 50; 200; 25 MG/1; MG/1; MG/1
1 TABLET ORAL DAILY
Qty: 30 TABLET | Refills: 2 | Status: ACTIVE | OUTPATIENT
Start: 2025-04-02 | End: 2025-07-01

## 2025-05-04 DIAGNOSIS — H65.411 CHRONIC ALLERGIC OTITIS MEDIA OF RIGHT EAR: ICD-10-CM

## 2025-05-04 DIAGNOSIS — Z71.84 TRAVEL ADVICE ENCOUNTER: ICD-10-CM

## 2025-05-05 RX ORDER — CETIRIZINE HYDROCHLORIDE 10 MG/1
10 TABLET ORAL DAILY
Qty: 30 TABLET | Refills: 11 | Status: SHIPPED | OUTPATIENT
Start: 2025-05-05 | End: 2026-05-05

## 2025-06-11 ENCOUNTER — PATIENT MESSAGE (OUTPATIENT)
Dept: ADMINISTRATIVE | Facility: OTHER | Age: 45
End: 2025-06-11
Payer: COMMERCIAL

## 2025-07-14 DIAGNOSIS — B20 HUMAN IMMUNODEFICIENCY VIRUS (HIV) DISEASE: ICD-10-CM

## 2025-07-14 RX ORDER — BICTEGRAVIR SODIUM, EMTRICITABINE, AND TENOFOVIR ALAFENAMIDE FUMARATE 50; 200; 25 MG/1; MG/1; MG/1
1 TABLET ORAL DAILY
Qty: 30 TABLET | Refills: 2 | Status: ACTIVE | OUTPATIENT
Start: 2025-07-14 | End: 2025-10-12

## 2025-07-29 DIAGNOSIS — Z71.84 TRAVEL ADVICE ENCOUNTER: ICD-10-CM

## 2025-07-29 DIAGNOSIS — I10 ESSENTIAL HYPERTENSION: ICD-10-CM

## 2025-07-29 DIAGNOSIS — H65.411 CHRONIC ALLERGIC OTITIS MEDIA OF RIGHT EAR: ICD-10-CM

## 2025-07-29 RX ORDER — LISINOPRIL 20 MG/1
20 TABLET ORAL DAILY
Qty: 30 TABLET | Refills: 11 | Status: SHIPPED | OUTPATIENT
Start: 2025-07-29 | End: 2026-07-29

## 2025-07-29 RX ORDER — CETIRIZINE HYDROCHLORIDE 10 MG/1
10 TABLET ORAL DAILY
Qty: 30 TABLET | Refills: 11 | Status: SHIPPED | OUTPATIENT
Start: 2025-07-29 | End: 2026-07-29

## (undated) DEVICE — BLADE BEVELED GUARISCO

## (undated) DEVICE — PAD CUSTOM COTTON 2 X 2

## (undated) DEVICE — PACK MYRINGOTOMY CUSTOM

## (undated) DEVICE — SUCTION SURGICAL FRAZR